# Patient Record
Sex: FEMALE | Race: WHITE | NOT HISPANIC OR LATINO | URBAN - METROPOLITAN AREA
[De-identification: names, ages, dates, MRNs, and addresses within clinical notes are randomized per-mention and may not be internally consistent; named-entity substitution may affect disease eponyms.]

---

## 2017-06-01 ENCOUNTER — INPATIENT (INPATIENT)
Facility: HOSPITAL | Age: 82
LOS: 0 days | Discharge: HOME HEALTH | End: 2017-06-02
Attending: INTERNAL MEDICINE

## 2017-06-01 DIAGNOSIS — M81.0 AGE-RELATED OSTEOPOROSIS WITHOUT CURRENT PATHOLOGICAL FRACTURE: ICD-10-CM

## 2017-06-01 DIAGNOSIS — I10 ESSENTIAL (PRIMARY) HYPERTENSION: ICD-10-CM

## 2017-06-01 DIAGNOSIS — S72.009A FRACTURE OF UNSPECIFIED PART OF NECK OF UNSPECIFIED FEMUR, INITIAL ENCOUNTER FOR CLOSED FRACTURE: ICD-10-CM

## 2017-06-01 DIAGNOSIS — R29.6 REPEATED FALLS: ICD-10-CM

## 2017-06-01 DIAGNOSIS — R39.89 OTHER SYMPTOMS AND SIGNS INVOLVING THE GENITOURINARY SYSTEM: ICD-10-CM

## 2017-06-28 DIAGNOSIS — K59.00 CONSTIPATION, UNSPECIFIED: ICD-10-CM

## 2017-06-28 DIAGNOSIS — R47.81 SLURRED SPEECH: ICD-10-CM

## 2017-06-28 DIAGNOSIS — I10 ESSENTIAL (PRIMARY) HYPERTENSION: ICD-10-CM

## 2017-06-28 DIAGNOSIS — R53.1 WEAKNESS: ICD-10-CM
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
## 2017-06-28 DIAGNOSIS — R47.1 DYSARTHRIA AND ANARTHRIA: ICD-10-CM

## 2017-06-28 DIAGNOSIS — E86.0 DEHYDRATION: ICD-10-CM

## 2017-06-28 DIAGNOSIS — Z90.49 ACQUIRED ABSENCE OF OTHER SPECIFIED PARTS OF DIGESTIVE TRACT: ICD-10-CM

## 2017-06-28 DIAGNOSIS — M19.90 UNSPECIFIED OSTEOARTHRITIS, UNSPECIFIED SITE: ICD-10-CM
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
## 2017-06-28 DIAGNOSIS — Z87.81 PERSONAL HISTORY OF (HEALED) TRAUMATIC FRACTURE: ICD-10-CM

## 2017-06-28 DIAGNOSIS — Z96.653 PRESENCE OF ARTIFICIAL KNEE JOINT, BILATERAL: ICD-10-CM
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
## 2017-06-28 DIAGNOSIS — M81.0 AGE-RELATED OSTEOPOROSIS WITHOUT CURRENT PATHOLOGICAL FRACTURE: ICD-10-CM

## 2017-06-28 DIAGNOSIS — S50.02XA CONTUSION OF LEFT ELBOW, INITIAL ENCOUNTER: ICD-10-CM

## 2017-06-28 DIAGNOSIS — E21.3 HYPERPARATHYROIDISM, UNSPECIFIED: ICD-10-CM

## 2017-06-28 DIAGNOSIS — N39.0 URINARY TRACT INFECTION, SITE NOT SPECIFIED: ICD-10-CM

## 2019-04-11 ENCOUNTER — EMERGENCY (EMERGENCY)
Facility: HOSPITAL | Age: 84
LOS: 0 days | Discharge: HOME | End: 2019-04-11
Attending: EMERGENCY MEDICINE | Admitting: EMERGENCY MEDICINE
Payer: MEDICARE

## 2019-04-11 VITALS
HEIGHT: 63 IN | RESPIRATION RATE: 16 BRPM | WEIGHT: 130.07 LBS | OXYGEN SATURATION: 97 % | SYSTOLIC BLOOD PRESSURE: 189 MMHG | DIASTOLIC BLOOD PRESSURE: 82 MMHG | HEART RATE: 74 BPM | TEMPERATURE: 96 F

## 2019-04-11 VITALS
HEART RATE: 80 BPM | OXYGEN SATURATION: 99 % | DIASTOLIC BLOOD PRESSURE: 80 MMHG | SYSTOLIC BLOOD PRESSURE: 185 MMHG | RESPIRATION RATE: 18 BRPM

## 2019-04-11 DIAGNOSIS — M25.552 PAIN IN LEFT HIP: ICD-10-CM

## 2019-04-11 DIAGNOSIS — I10 ESSENTIAL (PRIMARY) HYPERTENSION: ICD-10-CM
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
## 2019-04-11 DIAGNOSIS — E03.9 HYPOTHYROIDISM, UNSPECIFIED: ICD-10-CM

## 2019-04-11 DIAGNOSIS — Z79.2 LONG TERM (CURRENT) USE OF ANTIBIOTICS: ICD-10-CM

## 2019-04-11 DIAGNOSIS — Z96.653 PRESENCE OF ARTIFICIAL KNEE JOINT, BILATERAL: Chronic | ICD-10-CM
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
## 2019-04-11 DIAGNOSIS — Z79.899 OTHER LONG TERM (CURRENT) DRUG THERAPY: ICD-10-CM

## 2019-04-11 DIAGNOSIS — Z96.653 PRESENCE OF ARTIFICIAL KNEE JOINT, BILATERAL: ICD-10-CM
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
## 2019-04-11 DIAGNOSIS — Z98.890 OTHER SPECIFIED POSTPROCEDURAL STATES: Chronic | ICD-10-CM

## 2019-04-11 DIAGNOSIS — Z96.642 PRESENCE OF LEFT ARTIFICIAL HIP JOINT: ICD-10-CM

## 2019-04-11 DIAGNOSIS — Z96.642 PRESENCE OF LEFT ARTIFICIAL HIP JOINT: Chronic | ICD-10-CM

## 2019-04-11 LAB
APPEARANCE UR: CLEAR — SIGNIFICANT CHANGE UP
BACTERIA # UR AUTO: ABNORMAL
BILIRUB UR-MCNC: NEGATIVE — SIGNIFICANT CHANGE UP
COLOR SPEC: YELLOW — SIGNIFICANT CHANGE UP
DIFF PNL FLD: ABNORMAL
EPI CELLS # UR: ABNORMAL /HPF
GLUCOSE UR QL: NEGATIVE MG/DL — SIGNIFICANT CHANGE UP
KETONES UR-MCNC: NEGATIVE — SIGNIFICANT CHANGE UP
LEUKOCYTE ESTERASE UR-ACNC: ABNORMAL
NITRITE UR-MCNC: POSITIVE
PH UR: 6.5 — SIGNIFICANT CHANGE UP (ref 5–8)
PROT UR-MCNC: 30 MG/DL
RBC CASTS # UR COMP ASSIST: ABNORMAL /HPF
SP GR SPEC: 1.02 — SIGNIFICANT CHANGE UP (ref 1.01–1.03)
UROBILINOGEN FLD QL: 0.2 MG/DL — SIGNIFICANT CHANGE UP (ref 0.2–0.2)
WBC UR QL: ABNORMAL /HPF

## 2019-04-11 PROCEDURE — 73502 X-RAY EXAM HIP UNI 2-3 VIEWS: CPT | Mod: 26,LT

## 2019-04-11 PROCEDURE — 70450 CT HEAD/BRAIN W/O DYE: CPT | Mod: 26

## 2019-04-11 PROCEDURE — 99284 EMERGENCY DEPT VISIT MOD MDM: CPT

## 2019-04-11 PROCEDURE — 72170 X-RAY EXAM OF PELVIS: CPT | Mod: 26,59

## 2019-04-11 PROCEDURE — 72125 CT NECK SPINE W/O DYE: CPT | Mod: 26

## 2019-04-11 RX ORDER — CEFPODOXIME PROXETIL 100 MG
1 TABLET ORAL
Qty: 14 | Refills: 0 | OUTPATIENT
Start: 2019-04-11 | End: 2019-04-17

## 2019-04-11 RX ORDER — OXYCODONE AND ACETAMINOPHEN 5; 325 MG/1; MG/1
1 TABLET ORAL ONCE
Qty: 0 | Refills: 0 | Status: DISCONTINUED | OUTPATIENT
Start: 2019-04-11 | End: 2019-04-11

## 2019-04-11 RX ADMIN — OXYCODONE AND ACETAMINOPHEN 1 TABLET(S): 5; 325 TABLET ORAL at 13:23

## 2019-04-11 NOTE — ED ADULT NURSE NOTE - PSH
H/O carpal tunnel repair  B/L  H/O total knee replacement, bilateral    History of hip replacement, total, left H/O carpal tunnel repair  B/L  H/O total knee replacement, bilateral    History of hip replacement, total, left  approx 10/2013

## 2019-04-11 NOTE — ED PROVIDER NOTE - NS ED ROS FT
Review of Systems    Constitutional: (-) fever  Cardiovascular: (-) chest pain, (-) palpitation   Respiratory: (-) shortness of breath  Gastrointestinal: (-) abdominal pain (-) vomiting  Musculoskeletal: (-) neck pain, (-) back pain  Integumentary: (-) rash, (-) edema  Neurological: (-) headache, (-) altered mental status  Heme/Lymph: (-) easy bruising (-) easy bleeding

## 2019-04-11 NOTE — ED PROVIDER NOTE - OBJECTIVE STATEMENT
89 yo f pmhx sig fo HTN and hypothyroidism presents with L hip pain s/p trip and fall over the curb on Tue. [place] mall tri[place] curb landing on L side and L hip pain and head striking ground with no LOC. Pt was able to stand and bear weight on L hip immediately after the fall and since with pain AROM and walking on L hip. No n/v, ams, focal deficits, numbness or paresthesias, lightheadedness or syncope. No anticoag/antiplatlet use.    I have reviewed available current nursing and previous documentation of past medical, surgical, family, and/or social history.

## 2019-04-11 NOTE — ED PROVIDER NOTE - PSH
H/O carpal tunnel repair  B/L  H/O total knee replacement, bilateral    History of hip replacement, total, left  approx 10/2013

## 2019-04-11 NOTE — ED PROVIDER NOTE - PROGRESS NOTE DETAILS
Pt walked to PT therapy for clearance Pt evaluated and cleared by PT pt is able to walk up the stairs and flat ground, live with son.

## 2019-04-11 NOTE — ED PROVIDER NOTE - CLINICAL SUMMARY MEDICAL DECISION MAKING FREE TEXT BOX
extremely spry and functional 89y/o with left hip pain s/p mechanical fall as above, had actually been having pain in left SIJ area prior to fall, saw PMD who prescribed tylenol without relief, pt with no other symptoms, no knee pain, son concerned as patient has hardware in hip, on exam vital signs appreciated, well appearing and AAO x 3, no marks of trauma, head nc/at, perrla, EOMI, conj pink op clear neck supple cor rrr lungs cta abd snt no c/c/e pulses equal calves nontender neuro intact, FROM x 4 with minimal left hip pain on external rotation, NVI, imaging reviewed, pt evaluated by rehab, is safe discharge, incidental CTH finding d/w patient and son, may f/u with neuro as outpatient. Patient counseled regarding conditions which should prompt return.

## 2019-04-11 NOTE — ED PROVIDER NOTE - PHYSICAL EXAMINATION
Physical Exam    Vital Signs: I have reviewed the initial vital signs.  Constitutional: well-nourished, appears stated age, no acute distress  Eyes: PERRLA, EOM intact, and symmetrical lids.  ENT: No TTP over the head or face,.  Cardiovascular: regular rate, regular rhythm, well-perfused extremities  Respiratory: unlabored respiratory effort, clear to auscultation bilaterally  Gastrointestinal: soft, non-tender abdomen, no bruising, no distention, atraumatic  Musculoskeletal: supple neck, no C-spine TTP, full neck flexion and rotation, ambulates and bear weight with full ROM at the HIP b/l, chest atraumatic with no TTP, no spinal ttp. No bruising, no abrasions.  Integumentary: No bruising, no lacerations, no abrasions.  Neurologic: awake, alert, cranial nerves II-XII grossly intact, extremities’ motor and sensory functions grossly intact, no ataxia, no dysemtria

## 2019-04-11 NOTE — ED PROVIDER NOTE - NSFOLLOWUPINSTRUCTIONS_ED_ALL_ED_FT
Fall Prevention in the Home    Falls can cause injuries and can affect people from all age groups. There are many simple things that you can do to make your home safe and to help prevent falls.    WHAT CAN I DO ON THE OUTSIDE OF MY HOME?  Regularly repair the edges of walkways and driveways and fix any cracks.  Remove high doorway thresholds.  Trim any shrubbery on the main path into your home.  Use bright outdoor lighting.  Clear walkways of debris and clutter, including tools and rocks.  Regularly check that handrails are securely fastened and in good repair. Both sides of any steps should have handrails.  Install guardrails along the edges of any raised decks or porches.  Have leaves, snow, and ice cleared regularly.  Use sand or salt on walkways during winter months.  In the garage, clean up any spills right away, including grease or oil spills.    WHAT CAN I DO IN THE BATHROOM?  Use night lights.  Install grab bars by the toilet and in the tub and shower. Do not use towel bars as grab bars.  Use non-skid mats or decals on the floor of the tub or shower.  If you need to sit down while you are in the shower, use a plastic, non-slip stool.  Keep the floor dry. Immediately clean up any water that spills on the floor.  Remove soap buildup in the tub or shower on a regular basis.  Attach bath mats securely with double-sided non-slip rug tape.  Remove throw rugs and other tripping hazards from the floor.    WHAT CAN I DO IN THE BEDROOM?  Use night lights.  Make sure that a bedside light is easy to reach.  Do not use oversized bedding that drapes onto the floor.  Have a firm chair that has side arms to use for getting dressed.  Remove throw rugs and other tripping hazards from the floor.    WHAT CAN I DO IN THE KITCHEN?  Clean up any spills right away.  Avoid walking on wet floors.  Place frequently used items in easy-to-reach places.  If you need to reach for something above you, use a sturdy step stool that has a grab bar.  Keep electrical cables out of the way.  Do not use floor polish or wax that makes floors slippery. If you have to use wax, make sure that it is non-skid floor wax.  Remove throw rugs and other tripping hazards from the floor.    WHAT CAN I DO IN THE STAIRWAYS?  Do not leave any items on the stairs.  Make sure that there are handrails on both sides of the stairs. Fix handrails that are broken or loose. Make sure that handrails are as long as the stairways.  Check any carpeting to make sure that it is firmly attached to the stairs. Fix any carpet that is loose or worn.  Avoid having throw rugs at the top or bottom of stairways, or secure the rugs with carpet tape to prevent them from moving.  Make sure that you have a light switch at the top of the stairs and the bottom of the stairs. If you do not have them, have them installed.    WHAT ARE SOME OTHER FALL PREVENTION TIPS?  Wear closed-toe shoes that fit well and support your feet. Wear shoes that have rubber soles or low heels.  When you use a stepladder, make sure that it is completely opened and that the sides are firmly locked. Have someone hold the ladder while you are using it. Do not climb a closed stepladder.  Add color or contrast paint or tape to grab bars and handrails in your home. Place contrasting color strips on the first and last steps.  Use mobility aids as needed, such as canes, walkers, scooters, and crutches.  Turn on lights if it is dark. Replace any light bulbs that burn out.  Set up furniture so that there are clear paths. Keep the furniture in the same spot.  Fix any uneven floor surfaces.  Choose a carpet design that does not hide the edge of steps of a stairway.  Be aware of any and all pets.  Review your medicines with your healthcare provider. Some medicines can cause dizziness or changes in blood pressure, which increase your risk of falling.     Talk with your health care provider about other ways that you can decrease your risk of falls. This may include working with a physical therapist or  to improve your strength, balance, and endurance.    ADDITIONAL NOTES AND INSTRUCTIONS    Please follow up with your Primary MD in 24-48 hr.  Seek immediate medical care for any new/worsening signs or symptoms.       Contusion    A contusion is a deep bruise. Contusions are the result of a blunt injury to tissues and muscle fibers under the skin. The skin overlying the contusion may turn blue, purple, or yellow. Symptoms also include pain and swelling in the injured area. Symptoms should resolve with time.     SEEK IMMEDIATE MEDICAL CARE IF YOU HAVE THE FOLLOWING SYMPTOMS: severe pain, numbness, tingling, pain, weakness, or skin color/temperature change in any part of your body distal to the fracture.    FOLLOW UP WITH NEUROLOGY AND Physical Therapy REHAB    TO follow up with PT rehab Call [phone] for [facility]

## 2019-04-11 NOTE — ED PROVIDER NOTE - ATTENDING CONTRIBUTION TO CARE
[address] [facility] EMERGENCY DEPT 
[facility] [address] [place] [id number] 
[phone] Work/School Note Date: 10/27/2017 To Whom It May concern: 
 
Homero Tabares was seen and treated today in the emergency room by the following provider(s): 
Attending Provider: Gonzales Vicente MD 
Nurse Practitioner: Lui Maloney NP. Homero Tabares may return to work on 10/30/17. Sincerely, Lui Maloney NP 
 
 
 

extremely spry and functional 89y/o with left hip pain s/p mechanical fall as above, had actually been having pain in left SIJ area prior to fall, saw PMD who prescribed tylenol without relief, pt with no other symptoms, no knee pain, son concerned as patient has hardware in hip, on exam vital signs appreciated, well appearing and AAO x 3, no marks of trauma, head nc/at, perrla, EOMI, conj pink op clear neck supple cor rrr lungs cta abd snt no c/c/e pulses equal calves nontender neuro intact, FROM x 4 with minimal left hip pain on external rotation, NVI, imaging reviewed, pt evaluated by rehab, is safe discharge, incidental CTH finding d/w patient and son, may f/u with neuro as outpatient. Patient counseled regarding conditions which should prompt return.

## 2019-04-11 NOTE — ED ADULT TRIAGE NOTE - CHIEF COMPLAINT QUOTE
Pt. tripped and fall  and complain of left hip pain and left arm pain. Pt. has hx of left hip replacement

## 2019-04-25 ENCOUNTER — INPATIENT (INPATIENT)
Facility: HOSPITAL | Age: 84
LOS: 5 days | Discharge: HOME HEALTH | End: 2019-05-01
Attending: INTERNAL MEDICINE | Admitting: INTERNAL MEDICINE
Payer: MEDICARE

## 2019-04-25 VITALS
HEIGHT: 64 IN | SYSTOLIC BLOOD PRESSURE: 197 MMHG | WEIGHT: 110.01 LBS | OXYGEN SATURATION: 94 % | DIASTOLIC BLOOD PRESSURE: 86 MMHG | HEART RATE: 70 BPM | TEMPERATURE: 98 F | RESPIRATION RATE: 18 BRPM

## 2019-04-25 DIAGNOSIS — Z96.653 PRESENCE OF ARTIFICIAL KNEE JOINT, BILATERAL: Chronic | ICD-10-CM

## 2019-04-25 DIAGNOSIS — Z96.642 PRESENCE OF LEFT ARTIFICIAL HIP JOINT: Chronic | ICD-10-CM

## 2019-04-25 DIAGNOSIS — Z98.890 OTHER SPECIFIED POSTPROCEDURAL STATES: Chronic | ICD-10-CM

## 2019-04-25 LAB
ALBUMIN SERPL ELPH-MCNC: 4 G/DL — SIGNIFICANT CHANGE UP (ref 3.5–5.2)
ALP SERPL-CCNC: 68 U/L — SIGNIFICANT CHANGE UP (ref 30–115)
ALT FLD-CCNC: 10 U/L — SIGNIFICANT CHANGE UP (ref 0–41)
ANION GAP SERPL CALC-SCNC: 12 MMOL/L — SIGNIFICANT CHANGE UP (ref 7–14)
APPEARANCE UR: CLEAR — SIGNIFICANT CHANGE UP
APTT BLD: 27.5 SEC — SIGNIFICANT CHANGE UP (ref 27–39.2)
AST SERPL-CCNC: 22 U/L — SIGNIFICANT CHANGE UP (ref 0–41)
BACTERIA # UR AUTO: ABNORMAL
BASOPHILS # BLD AUTO: 0.04 K/UL — SIGNIFICANT CHANGE UP (ref 0–0.2)
BASOPHILS NFR BLD AUTO: 0.7 % — SIGNIFICANT CHANGE UP (ref 0–1)
BILIRUB SERPL-MCNC: 0.4 MG/DL — SIGNIFICANT CHANGE UP (ref 0.2–1.2)
BILIRUB UR-MCNC: NEGATIVE — SIGNIFICANT CHANGE UP
BUN SERPL-MCNC: 35 MG/DL — HIGH (ref 10–20)
CALCIUM SERPL-MCNC: 9.9 MG/DL — SIGNIFICANT CHANGE UP (ref 8.5–10.1)
CHLORIDE SERPL-SCNC: 108 MMOL/L — SIGNIFICANT CHANGE UP (ref 98–110)
CO2 SERPL-SCNC: 23 MMOL/L — SIGNIFICANT CHANGE UP (ref 17–32)
COD CRY URNS QL: NEGATIVE — SIGNIFICANT CHANGE UP
COLOR SPEC: YELLOW — SIGNIFICANT CHANGE UP
CREAT SERPL-MCNC: 1.2 MG/DL — SIGNIFICANT CHANGE UP (ref 0.7–1.5)
DIFF PNL FLD: ABNORMAL
EOSINOPHIL # BLD AUTO: 0.16 K/UL — SIGNIFICANT CHANGE UP (ref 0–0.7)
EOSINOPHIL NFR BLD AUTO: 3 % — SIGNIFICANT CHANGE UP (ref 0–8)
EPI CELLS # UR: ABNORMAL /HPF
GLUCOSE SERPL-MCNC: 89 MG/DL — SIGNIFICANT CHANGE UP (ref 70–99)
GLUCOSE UR QL: NEGATIVE MG/DL — SIGNIFICANT CHANGE UP
GRAN CASTS # UR COMP ASSIST: NEGATIVE — SIGNIFICANT CHANGE UP
HCT VFR BLD CALC: 41.3 % — SIGNIFICANT CHANGE UP (ref 37–47)
HGB BLD-MCNC: 13.6 G/DL — SIGNIFICANT CHANGE UP (ref 12–16)
HYALINE CASTS # UR AUTO: NEGATIVE — SIGNIFICANT CHANGE UP
IMM GRANULOCYTES NFR BLD AUTO: 0.4 % — HIGH (ref 0.1–0.3)
INR BLD: 1 RATIO — SIGNIFICANT CHANGE UP (ref 0.65–1.3)
KETONES UR-MCNC: NEGATIVE — SIGNIFICANT CHANGE UP
LEUKOCYTE ESTERASE UR-ACNC: ABNORMAL
LYMPHOCYTES # BLD AUTO: 0.98 K/UL — LOW (ref 1.2–3.4)
LYMPHOCYTES # BLD AUTO: 18.1 % — LOW (ref 20.5–51.1)
MAGNESIUM SERPL-MCNC: 1.9 MG/DL — SIGNIFICANT CHANGE UP (ref 1.8–2.4)
MCHC RBC-ENTMCNC: 28.6 PG — SIGNIFICANT CHANGE UP (ref 27–31)
MCHC RBC-ENTMCNC: 32.9 G/DL — SIGNIFICANT CHANGE UP (ref 32–37)
MCV RBC AUTO: 86.9 FL — SIGNIFICANT CHANGE UP (ref 81–99)
MONOCYTES # BLD AUTO: 0.78 K/UL — HIGH (ref 0.1–0.6)
MONOCYTES NFR BLD AUTO: 14.4 % — HIGH (ref 1.7–9.3)
NEUTROPHILS # BLD AUTO: 3.43 K/UL — SIGNIFICANT CHANGE UP (ref 1.4–6.5)
NEUTROPHILS NFR BLD AUTO: 63.4 % — SIGNIFICANT CHANGE UP (ref 42.2–75.2)
NITRITE UR-MCNC: POSITIVE
NRBC # BLD: 0 /100 WBCS — SIGNIFICANT CHANGE UP (ref 0–0)
PH UR: 6 — SIGNIFICANT CHANGE UP (ref 5–8)
PLATELET # BLD AUTO: 111 K/UL — LOW (ref 130–400)
POTASSIUM SERPL-MCNC: 4.7 MMOL/L — SIGNIFICANT CHANGE UP (ref 3.5–5)
POTASSIUM SERPL-SCNC: 4.7 MMOL/L — SIGNIFICANT CHANGE UP (ref 3.5–5)
PROT SERPL-MCNC: 7 G/DL — SIGNIFICANT CHANGE UP (ref 6–8)
PROT UR-MCNC: ABNORMAL MG/DL
PROTHROM AB SERPL-ACNC: 11.5 SEC — SIGNIFICANT CHANGE UP (ref 9.95–12.87)
RBC # BLD: 4.75 M/UL — SIGNIFICANT CHANGE UP (ref 4.2–5.4)
RBC # FLD: 13.2 % — SIGNIFICANT CHANGE UP (ref 11.5–14.5)
RBC CASTS # UR COMP ASSIST: ABNORMAL /HPF
SODIUM SERPL-SCNC: 143 MMOL/L — SIGNIFICANT CHANGE UP (ref 135–146)
SP GR SPEC: 1.02 — SIGNIFICANT CHANGE UP (ref 1.01–1.03)
TRI-PHOS CRY UR QL COMP ASSIST: NEGATIVE — SIGNIFICANT CHANGE UP
TROPONIN T SERPL-MCNC: <0.01 NG/ML — SIGNIFICANT CHANGE UP
URATE CRY FLD QL MICRO: NEGATIVE — SIGNIFICANT CHANGE UP
UROBILINOGEN FLD QL: 0.2 MG/DL — SIGNIFICANT CHANGE UP (ref 0.2–0.2)
WBC # BLD: 5.41 K/UL — SIGNIFICANT CHANGE UP (ref 4.8–10.8)
WBC # FLD AUTO: 5.41 K/UL — SIGNIFICANT CHANGE UP (ref 4.8–10.8)
WBC UR QL: ABNORMAL /HPF

## 2019-04-25 PROCEDURE — 99285 EMERGENCY DEPT VISIT HI MDM: CPT

## 2019-04-25 PROCEDURE — 70496 CT ANGIOGRAPHY HEAD: CPT | Mod: 26

## 2019-04-25 PROCEDURE — 71045 X-RAY EXAM CHEST 1 VIEW: CPT | Mod: 26

## 2019-04-25 PROCEDURE — 70450 CT HEAD/BRAIN W/O DYE: CPT | Mod: 26,59

## 2019-04-25 RX ORDER — CEFTRIAXONE 500 MG/1
1 INJECTION, POWDER, FOR SOLUTION INTRAMUSCULAR; INTRAVENOUS ONCE
Qty: 0 | Refills: 0 | Status: COMPLETED | OUTPATIENT
Start: 2019-04-25 | End: 2019-04-25

## 2019-04-25 RX ORDER — AMLODIPINE BESYLATE 2.5 MG/1
5 TABLET ORAL ONCE
Qty: 0 | Refills: 0 | Status: COMPLETED | OUTPATIENT
Start: 2019-04-25 | End: 2019-04-25

## 2019-04-25 RX ADMIN — AMLODIPINE BESYLATE 5 MILLIGRAM(S): 2.5 TABLET ORAL at 21:47

## 2019-04-25 RX ADMIN — CEFTRIAXONE 1 GRAM(S): 500 INJECTION, POWDER, FOR SOLUTION INTRAMUSCULAR; INTRAVENOUS at 21:21

## 2019-04-25 RX ADMIN — CEFTRIAXONE 100 GRAM(S): 500 INJECTION, POWDER, FOR SOLUTION INTRAMUSCULAR; INTRAVENOUS at 19:45

## 2019-04-25 NOTE — H&P ADULT - NSHPLABSRESULTS_GEN_ALL_CORE
< from: CT Angio Head w/ IV Cont (19 @ 19:04) >    EXAM:  CT ANGIO BRAIN (W)AW IC          IMPRESSION:    1.  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    2.  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    Spoke with LARISSA JONES PA on 2019 8:42 PM with readback.    ADRAINA NIXON M.D., RESIDENT RADIOLOGIST  This document has been electronically signed.  ANAHY CAREY M.D., ATTENDING RADIOLOGIST  This document has been electronically signed. 2019  8:42PM    < end of copied text >      < from: CT Head No Cont (19 @ 19:04) >    EXAM:  CT BRAIN          PROCEDURE DATE:  2019      IMPRESSION:    No CT evidence of acute territorial infarct, intracranial hemorrhage, or   mass effect.    Stable nonacute findings as detailed above.    ADRIANA NIXON M.D., RESIDENT RADIOLOGIST  This document has been electronically signed.  DONNIE MENDEZ M.D., ATTENDING RADIOLOGIST  This document has been electronically signed. 2019  7:54PM     < end of copied text >                          13.6   5.41  )-----------( 111      ( 2019 17:20 )             41.3         143  |  108  |  35<H>  ----------------------------<  89  4.7   |  23  |  1.2    Ca    9.9      2019 17:20  Mg     1.9         TPro  7.0  /  Alb  4.0  /  TBili  0.4  /  DBili  x   /  AST  22  /  ALT  10  /  AlkPhos  68            Urinalysis Basic - ( 2019 18:35 )    Color: Yellow / Appearance: Clear / S.020 / pH: x  Gluc: x / Ketone: Negative  / Bili: Negative / Urobili: 0.2 mg/dL   Blood: x / Protein: Trace mg/dL / Nitrite: Positive   Leuk Esterase: Small / RBC: 3-5 /HPF / WBC 6-10 /HPF   Sq Epi: x / Non Sq Epi: Few /HPF / Bacteria: Many      PT/INR - ( 2019 17:20 )   PT: 11.50 sec;   INR: 1.00 ratio         PTT - ( 2019 17:20 )  PTT:27.5 sec  Lactate Trend    CARDIAC MARKERS ( 2019 17:20 )  x     / <0.01 ng/mL / x     / x     / x          CAPILLARY BLOOD GLUCOSE      POCT Blood Glucose.: 82 mg/dL (2019 16:51)

## 2019-04-25 NOTE — ED ADULT NURSE NOTE - NSIMPLEMENTINTERV_GEN_ALL_ED
Implemented All Fall with Harm Risk Interventions:  [place] to call system. Call bell, personal items and telephone within reach. Instruct patient to call for assistance. Room bathroom lighting operational. Non-slip footwear when patient is off stretcher. Physically safe environment: no spills, clutter or unnecessary equipment. Stretcher in lowest position, wheels locked, appropriate side rails in place. Provide visual cue, wrist band, yellow gown, etc. Monitor gait and stability. Monitor for mental status changes and reorient to person, place, and time. Review medications for side effects contributing to fall risk. Reinforce activity limits and safety measures with patient and family. Provide visual clues: red socks.

## 2019-04-25 NOTE — ED PROVIDER NOTE - CLINICAL SUMMARY MEDICAL DECISION MAKING FREE TEXT BOX
CT negative for acute process. Discussed with NSG. Aneurysm likely incidental. Family aware.  Admitted to medicine.

## 2019-04-25 NOTE — H&P ADULT - NSHPREVIEWOFSYSTEMS_GEN_ALL_CORE
see HPI and PMH, uses glasses see HPI and PMH, uses glasses has hearing loss, and currently a sore throat. Other 7 ROS generally negative

## 2019-04-25 NOTE — ED PROVIDER NOTE - OBJECTIVE STATEMENT
89 y/o female presents with family with dizziness. patient awoke today from nap with symptoms. patient denies any headache, visual changes, falls or head trauma. patient with good po intake and denies any gross hematuria. patient without any palpitations, syncope, chest pain, weakness to extremities. patient states generalized weakness . patient denies any chills or fever.

## 2019-04-25 NOTE — ED ADULT NURSE REASSESSMENT NOTE - NS ED NURSE REASSESS COMMENT FT1
Pt is noted with elevated BP, spoke w patient who states she takes her BP meds QHS, Dr. Quezada ordered Norvasc 5 mg x 1 dose. Will re-assess BP.

## 2019-04-25 NOTE — ED PROVIDER NOTE - NS ED ROS FT
Review of Systems    Constitutional: (-) fever/ chills   Eyes/ENT: (-) blurry vision, (-) epistaxis (-)no tinnitus  Cardiovascular: (-) chest pain, (-) syncope (-) palpitations  Respiratory: (-) cough, (-) shortness of breath  Gastrointestinal: (-) vomiting, (-) diarrhea (-) abdominal pain  Musculoskeletal: (-) neck pain, (-) back pain, (-) joint pain (-) pedal edema   Integumentary: (-) rash, (-) swelling  Neurological: (-) headache, (-) altered mental status (+)dizziness  Psychiatric: (-) hallucinations or depression   Allergic/Immunologic: (-) pruritus

## 2019-04-25 NOTE — H&P ADULT - HISTORY OF PRESENT ILLNESS
[age]yo female tells me she had dizziness in the morning but even though that symptom has resolved, she continues to have weakness. Notes that she generally is not eating as much as she used to 89yo female tells me she had dizziness in the morning but even though that symptom has resolved, she continues to have weakness. Notes that she generally is not eating as much as she used to. Denies headache or falls. No modifying factors readily identified

## 2019-04-25 NOTE — ED PROVIDER NOTE - PROGRESS NOTE DETAILS
spoke with neurosurgical EMIL Pena who consulted dr. zuly newsome regarding CTA findings . patient to follow up outpatient spoke with dr. aiken who accepts admission

## 2019-04-25 NOTE — H&P ADULT - PROBLEM SELECTOR PLAN 5
ER discussed findings (stenosis) with neurosurgery attending who advised outpatient followup (Add statin?)

## 2019-04-25 NOTE — H&P ADULT - NSICDXPASTSURGICALHX_GEN_ALL_CORE_FT
PAST SURGICAL HISTORY:  H/O carpal tunnel repair B/L    H/O total knee replacement, bilateral     History of hip replacement, total, left approx 10/2013

## 2019-04-25 NOTE — ED PROVIDER NOTE - ATTENDING CONTRIBUTION TO CARE
Pt presenting with dizziness described as vertigo on waking this AM. [id number] at this time. No lateralizing sign on exam. No nystagmus. - Plan: Head CT, CTA, CBC, INR, PTT, CMP, FSG, EKG, reassess. disposition unclear at this time.

## 2019-04-26 DIAGNOSIS — N39.0 URINARY TRACT INFECTION, SITE NOT SPECIFIED: ICD-10-CM

## 2019-04-26 DIAGNOSIS — E86.0 DEHYDRATION: ICD-10-CM

## 2019-04-26 DIAGNOSIS — I10 ESSENTIAL (PRIMARY) HYPERTENSION: ICD-10-CM

## 2019-04-26 DIAGNOSIS — R90.89 OTHER ABNORMAL FINDINGS ON DIAGNOSTIC IMAGING OF CENTRAL NERVOUS SYSTEM: ICD-10-CM

## 2019-04-26 DIAGNOSIS — R42 DIZZINESS AND GIDDINESS: ICD-10-CM

## 2019-04-26 DIAGNOSIS — E03.9 HYPOTHYROIDISM, UNSPECIFIED: ICD-10-CM

## 2019-04-26 LAB
ALBUMIN SERPL ELPH-MCNC: 3.8 G/DL — SIGNIFICANT CHANGE UP (ref 3.5–5.2)
ALP SERPL-CCNC: 68 U/L — SIGNIFICANT CHANGE UP (ref 30–115)
ALT FLD-CCNC: 9 U/L — SIGNIFICANT CHANGE UP (ref 0–41)
ANION GAP SERPL CALC-SCNC: 11 MMOL/L — SIGNIFICANT CHANGE UP (ref 7–14)
AST SERPL-CCNC: 16 U/L — SIGNIFICANT CHANGE UP (ref 0–41)
BILIRUB SERPL-MCNC: 0.5 MG/DL — SIGNIFICANT CHANGE UP (ref 0.2–1.2)
BUN SERPL-MCNC: 28 MG/DL — HIGH (ref 10–20)
CALCIUM SERPL-MCNC: 9.2 MG/DL — SIGNIFICANT CHANGE UP (ref 8.5–10.1)
CHLORIDE SERPL-SCNC: 103 MMOL/L — SIGNIFICANT CHANGE UP (ref 98–110)
CO2 SERPL-SCNC: 25 MMOL/L — SIGNIFICANT CHANGE UP (ref 17–32)
CREAT SERPL-MCNC: 1 MG/DL — SIGNIFICANT CHANGE UP (ref 0.7–1.5)
GLUCOSE SERPL-MCNC: 86 MG/DL — SIGNIFICANT CHANGE UP (ref 70–99)
HCT VFR BLD CALC: 41.5 % — SIGNIFICANT CHANGE UP (ref 37–47)
HGB BLD-MCNC: 13.6 G/DL — SIGNIFICANT CHANGE UP (ref 12–16)
MCHC RBC-ENTMCNC: 28 PG — SIGNIFICANT CHANGE UP (ref 27–31)
MCHC RBC-ENTMCNC: 32.8 G/DL — SIGNIFICANT CHANGE UP (ref 32–37)
MCV RBC AUTO: 85.6 FL — SIGNIFICANT CHANGE UP (ref 81–99)
NRBC # BLD: 0 /100 WBCS — SIGNIFICANT CHANGE UP (ref 0–0)
PLATELET # BLD AUTO: 144 K/UL — SIGNIFICANT CHANGE UP (ref 130–400)
POTASSIUM SERPL-MCNC: 4 MMOL/L — SIGNIFICANT CHANGE UP (ref 3.5–5)
POTASSIUM SERPL-SCNC: 4 MMOL/L — SIGNIFICANT CHANGE UP (ref 3.5–5)
PROT SERPL-MCNC: 6.6 G/DL — SIGNIFICANT CHANGE UP (ref 6–8)
RBC # BLD: 4.85 M/UL — SIGNIFICANT CHANGE UP (ref 4.2–5.4)
RBC # FLD: 13 % — SIGNIFICANT CHANGE UP (ref 11.5–14.5)
SODIUM SERPL-SCNC: 139 MMOL/L — SIGNIFICANT CHANGE UP (ref 135–146)
TSH SERPL-MCNC: 0.64 UIU/ML — SIGNIFICANT CHANGE UP (ref 0.27–4.2)
WBC # BLD: 5 K/UL — SIGNIFICANT CHANGE UP (ref 4.8–10.8)
WBC # FLD AUTO: 5 K/UL — SIGNIFICANT CHANGE UP (ref 4.8–10.8)

## 2019-04-26 RX ORDER — CEFTRIAXONE 500 MG/1
1 INJECTION, POWDER, FOR SOLUTION INTRAMUSCULAR; INTRAVENOUS EVERY 24 HOURS
Qty: 0 | Refills: 0 | Status: DISCONTINUED | OUTPATIENT
Start: 2019-04-26 | End: 2019-04-30

## 2019-04-26 RX ORDER — ASPIRIN/CALCIUM CARB/MAGNESIUM 324 MG
81 TABLET ORAL DAILY
Qty: 0 | Refills: 0 | Status: DISCONTINUED | OUTPATIENT
Start: 2019-04-26 | End: 2019-05-01

## 2019-04-26 RX ORDER — LOSARTAN POTASSIUM 100 MG/1
25 TABLET, FILM COATED ORAL ONCE
Qty: 0 | Refills: 0 | Status: COMPLETED | OUTPATIENT
Start: 2019-04-26 | End: 2019-04-26

## 2019-04-26 RX ORDER — HYDROXYZINE HCL 10 MG
25 TABLET ORAL ONCE
Qty: 0 | Refills: 0 | Status: COMPLETED | OUTPATIENT
Start: 2019-04-26 | End: 2019-04-26

## 2019-04-26 RX ORDER — AMLODIPINE BESYLATE 2.5 MG/1
0 TABLET ORAL
Qty: 0 | Refills: 0 | COMMUNITY

## 2019-04-26 RX ORDER — HEPARIN SODIUM 5000 [USP'U]/ML
5000 INJECTION INTRAVENOUS; SUBCUTANEOUS EVERY 12 HOURS
Qty: 0 | Refills: 0 | Status: DISCONTINUED | OUTPATIENT
Start: 2019-04-26 | End: 2019-05-01

## 2019-04-26 RX ORDER — AMLODIPINE BESYLATE 2.5 MG/1
5 TABLET ORAL DAILY
Qty: 0 | Refills: 0 | Status: DISCONTINUED | OUTPATIENT
Start: 2019-04-26 | End: 2019-04-27

## 2019-04-26 RX ORDER — LOSARTAN POTASSIUM 100 MG/1
0 TABLET, FILM COATED ORAL
Qty: 0 | Refills: 0 | COMMUNITY

## 2019-04-26 RX ORDER — BENZOCAINE AND MENTHOL 5; 1 G/100ML; G/100ML
1 LIQUID ORAL
Qty: 0 | Refills: 0 | Status: DISCONTINUED | OUTPATIENT
Start: 2019-04-26 | End: 2019-05-01

## 2019-04-26 RX ORDER — SODIUM CHLORIDE 9 MG/ML
1000 INJECTION, SOLUTION INTRAVENOUS
Qty: 0 | Refills: 0 | Status: DISCONTINUED | OUTPATIENT
Start: 2019-04-26 | End: 2019-04-27

## 2019-04-26 RX ORDER — CINACALCET 30 MG/1
30 TABLET, FILM COATED ORAL DAILY
Qty: 0 | Refills: 0 | Status: DISCONTINUED | OUTPATIENT
Start: 2019-04-26 | End: 2019-05-01

## 2019-04-26 RX ORDER — LOSARTAN POTASSIUM 100 MG/1
50 TABLET, FILM COATED ORAL DAILY
Qty: 0 | Refills: 0 | Status: DISCONTINUED | OUTPATIENT
Start: 2019-04-26 | End: 2019-04-28

## 2019-04-26 RX ORDER — CINACALCET 30 MG/1
0 TABLET, FILM COATED ORAL
Qty: 0 | Refills: 0 | COMMUNITY

## 2019-04-26 RX ORDER — SODIUM CHLORIDE 9 MG/ML
1000 INJECTION, SOLUTION INTRAVENOUS
Qty: 0 | Refills: 0 | Status: DISCONTINUED | OUTPATIENT
Start: 2019-04-26 | End: 2019-04-26

## 2019-04-26 RX ADMIN — SODIUM CHLORIDE 50 MILLILITER(S): 9 INJECTION, SOLUTION INTRAVENOUS at 03:30

## 2019-04-26 RX ADMIN — HEPARIN SODIUM 5000 UNIT(S): 5000 INJECTION INTRAVENOUS; SUBCUTANEOUS at 05:38

## 2019-04-26 RX ADMIN — CEFTRIAXONE 100 GRAM(S): 500 INJECTION, POWDER, FOR SOLUTION INTRAMUSCULAR; INTRAVENOUS at 08:58

## 2019-04-26 RX ADMIN — Medication 25 MILLIGRAM(S): at 01:29

## 2019-04-26 RX ADMIN — SODIUM CHLORIDE 60 MILLILITER(S): 9 INJECTION, SOLUTION INTRAVENOUS at 13:26

## 2019-04-26 RX ADMIN — LOSARTAN POTASSIUM 50 MILLIGRAM(S): 100 TABLET, FILM COATED ORAL at 05:38

## 2019-04-26 RX ADMIN — CINACALCET 30 MILLIGRAM(S): 30 TABLET, FILM COATED ORAL at 11:19

## 2019-04-26 RX ADMIN — LOSARTAN POTASSIUM 25 MILLIGRAM(S): 100 TABLET, FILM COATED ORAL at 01:29

## 2019-04-26 RX ADMIN — Medication 25 MILLIGRAM(S): at 21:31

## 2019-04-26 RX ADMIN — HEPARIN SODIUM 5000 UNIT(S): 5000 INJECTION INTRAVENOUS; SUBCUTANEOUS at 17:16

## 2019-04-26 RX ADMIN — Medication 81 MILLIGRAM(S): at 11:19

## 2019-04-26 RX ADMIN — AMLODIPINE BESYLATE 5 MILLIGRAM(S): 2.5 TABLET ORAL at 13:34

## 2019-04-26 NOTE — PHYSICAL THERAPY INITIAL EVALUATION ADULT - GENERAL OBSERVATIONS, REHAB EVAL
14:05-14:35 Chart reviewed. Pt encountered semireclined in bed,  may be seen by Physical Therapist as confirmed with Nurse. Patient denied pain at rest and would like to walk to bathroom but feels "weak"; +IV

## 2019-04-26 NOTE — PROGRESS NOTE ADULT - SUBJECTIVE AND OBJECTIVE BOX
Progress Note:  Provider Speciality                            Hospitalist      CARLA LARRY [id number] 89y Female     CHIEF PRESENTING COMPLAINT:  Patient is a 89y old  Female who presents with a chief complaint of dizziness, weakness (2019 21:48)        SUBJECTIVE:  Patient was seen and examined at bedside.   . No significant overnight events reported.     HISTORY OF PRESENTING ILLNESS:  HPI:  89yo female tells me she had dizziness in the morning but even though that symptom has resolved, she continues to have weakness. Notes that she generally is not eating as much as she used to. Denies headache or falls. 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  Heart: S1+S2 audible, no murmur  Lungs: bilateral  fair air entry, no wheezing, no crepitations.  Abdomen: Soft, non-tender, non-distended , no  rigidity or guarding.  CNS: AAOx2, CN  grossly intact.  Extremities:  No edema          REVIEW OF SYSTEMS:  Patient denies any headache, any vision complaints, runny nose, fever, chills, sore throat. Denies chest pain, shortness of breath, palpitation. Denies nausea, vomiting, abdominal pain, diarrhoea, Denies urinary burning, urgency, frequency, dysuria. Denies weakness in any part of the body or numbness.   At least 10 systems were reviewed in ROS. All systems reviewed  are within normal limits except for the complaints as described in Subjective.    CONSULTS:  Consultant(s) Notes Reviewed by me.   Care Discussed with Consultants/Other Providers where required.        MEDICATIONS:  MEDICATIONS  (STANDING):  aspirin enteric coated 81 milliGRAM(s) Oral daily  cefTRIAXone   IVPB 1 Gram(s) IV Intermittent every 24 hours  cinacalcet 30 milliGRAM(s) Oral daily  heparin  Injectable 5000 Unit(s) SubCutaneous every 12 hours  losartan 50 milliGRAM(s) Oral daily  sodium chloride 0.45%. 1000 milliLiter(s) (50 mL/Hr) IV Continuous <Continuous>    MEDICATIONS  (PRN):  benzocaine 15 mG/menthol 3.6 mG Lozenge 1 Lozenge Oral four times a day PRN Sore Throat      LABOROTORY DATA/MICROBIOLOGY/I & O's:                        13.6   5.00  )-----------( 144      ( 2019 06:53 )             41.5         139  |  103  |  28<H>  ----------------------------<  86  4.0   |  25  |  1.0    Ca    9.2      2019 06:53  Mg     1.9         TPro  6.6  /  Alb  3.8  /  TBili  0.5  /  DBili  x   /  AST  16  /  ALT  9   /  AlkPhos  68      PT/INR - ( 2019 17:20 )   PT: 11.50 sec;   INR: 1.00 ratio         PTT - ( 2019 17:20 )  PTT:27.5 sec  Urinalysis Basic - ( 2019 18:35 )    Color: Yellow / Appearance: Clear / S.020 / pH: x  Gluc: x / Ketone: Negative  / Bili: Negative / Urobili: 0.2 mg/dL   Blood: x / Protein: Trace mg/dL / Nitrite: Positive   Leuk Esterase: Small / RBC: 3-5 /HPF / WBC 6-10 /HPF   Sq Epi: x / Non Sq Epi: Few /HPF / Bacteria: Many      CAPILLARY BLOOD GLUCOSE      POCT Blood Glucose.: 82 mg/dL (2019 16:51)        Urinalysis Basic - ( 2019 18:35 )    Color: Yellow / Appearance: Clear / S.020 / pH: x  Gluc: x / Ketone: Negative  / Bili: Negative / Urobili: 0.2 mg/dL   Blood: x / Protein: Trace mg/dL / Nitrite: Positive   Leuk Esterase: Small / RBC: 3-5 /HPF / WBC 6-10 /HPF   Sq Epi: x / Non Sq Epi: Few /HPF / Bacteria: Many                    ASSESSMENT:     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     2. 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       PAST MEDICAL HISTORY:  HTN (hypertension)     Hypothyroid.     PAST SURGICAL HISTORY:  H/O carpal tunnel repair B/L    H/O total knee replacement, bilateral     History of hip replacement, total, left approx 10/2013.    89yo female tells me she had dizziness and frequency of urine with associated geenralizaed wekness.  ASSESSMENT:  Principal Diagnosis:  ISMAEL on Chronic renal disease Stage  stage 3   Weakness & lethargy secondary to dehydration and possible cystitis /Urinary tract infection     Associated Active Comorbid Conditions:  HTN (hypertension)   Hypothyroid.   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 Outpatient follow up   Continue with losartan as renal function shows improvement.  Speech & swallow evaluation   follow up TSh  PT/Rehab consult.   Goals of care.  GI Prophylaxis: Protonix 40mg PO QQdaily   DVT Prophylaxis: Heparin 5000 units sc q8hrs Progress Note:  Provider Speciality                            Hospitalist      CARLA LARRY [id number] 89y Female     CHIEF PRESENTING COMPLAINT:  Patient is a 89y old  Female who presents with a chief complaint of dizziness, weakness (2019 21:48)        SUBJECTIVE:  Patient was seen and examined at bedside.   . No significant overnight events reported.     HISTORY OF PRESENTING ILLNESS:  HPI:  89yo female tells me she had dizziness in the morning but even though that symptom has resolved, she continues to have weakness. Notes that she generally is not eating as much as she used to. Denies headache or falls. 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  Heart: S1+S2 audible, no murmur  Lungs: bilateral  fair air entry, no wheezing, no crepitations.  Abdomen: Soft, non-tender, non-distended , no  rigidity or guarding.  CNS: AAOx2, CN  grossly intact.  Extremities:  No edema          REVIEW OF SYSTEMS:  Patient denies any headache, any vision complaints, runny nose, fever, chills, sore throat. Denies chest pain, shortness of breath, palpitation. Denies nausea, vomiting, abdominal pain, diarrhoea, Denies urinary burning, urgency, frequency, dysuria. Denies weakness in any part of the body or numbness.   At least 10 systems were reviewed in ROS. All systems reviewed  are within normal limits except for the complaints as described in Subjective.    CONSULTS:  Consultant(s) Notes Reviewed by me.   Care Discussed with Consultants/Other Providers where required.        MEDICATIONS:  MEDICATIONS  (STANDING):  aspirin enteric coated 81 milliGRAM(s) Oral daily  cefTRIAXone   IVPB 1 Gram(s) IV Intermittent every 24 hours  cinacalcet 30 milliGRAM(s) Oral daily  heparin  Injectable 5000 Unit(s) SubCutaneous every 12 hours  losartan 50 milliGRAM(s) Oral daily  sodium chloride 0.45%. 1000 milliLiter(s) (50 mL/Hr) IV Continuous <Continuous>    MEDICATIONS  (PRN):  benzocaine 15 mG/menthol 3.6 mG Lozenge 1 Lozenge Oral four times a day PRN Sore Throat      LABOROTORY DATA/MICROBIOLOGY/I & O's:                        13.6   5.00  )-----------( 144      ( 2019 06:53 )             41.5         139  |  103  |  28<H>  ----------------------------<  86  4.0   |  25  |  1.0    Ca    9.2      2019 06:53  Mg     1.9         TPro  6.6  /  Alb  3.8  /  TBili  0.5  /  DBili  x   /  AST  16  /  ALT  9   /  AlkPhos  68      PT/INR - ( 2019 17:20 )   PT: 11.50 sec;   INR: 1.00 ratio         PTT - ( 2019 17:20 )  PTT:27.5 sec  Urinalysis Basic - ( 2019 18:35 )    Color: Yellow / Appearance: Clear / S.020 / pH: x  Gluc: x / Ketone: Negative  / Bili: Negative / Urobili: 0.2 mg/dL   Blood: x / Protein: Trace mg/dL / Nitrite: Positive   Leuk Esterase: Small / RBC: 3-5 /HPF / WBC 6-10 /HPF   Sq Epi: x / Non Sq Epi: Few /HPF / Bacteria: Many      CAPILLARY BLOOD GLUCOSE      POCT Blood Glucose.: 82 mg/dL (2019 16:51)        Urinalysis Basic - ( 2019 18:35 )    Color: Yellow / Appearance: Clear / S.020 / pH: x  Gluc: x / Ketone: Negative  / Bili: Negative / Urobili: 0.2 mg/dL   Blood: x / Protein: Trace mg/dL / Nitrite: Positive   Leuk Esterase: Small / RBC: 3-5 /HPF / WBC 6-10 /HPF   Sq Epi: x / Non Sq Epi: Few /HPF / Bacteria: Many                    ASSESSMENT:    89yo female tells me she had dizziness and frequency of urine with associated geenralizaed wekness.  ASSESSMENT:  Principal Diagnosis:  ISMAEL on Chronic renal disease Stage  stage 3   Weakness & lethargy secondary to dehydration and possible cystitis /Urinary tract infection     Associated Active Comorbid Conditions:  HTN (hypertension)   Hyperthyroid.   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 Outpatient follow up   Continue with losartan as renal function shows improvement.  Speech & swallow evaluation   Hyperthyroid. -Continue Sensipar .Follow up TSh  Hypertension - not well controlled . added norvasc  PT/Rehab consult.   Goals of care.  GI Prophylaxis: Protonix 40mg PO QQdaily   DVT Prophylaxis: Heparin 5000 units sc q8hrs

## 2019-04-26 NOTE — PHYSICAL THERAPY INITIAL EVALUATION ADULT - IMPAIRED TRANSFERS: SIT/STAND, REHAB EVAL
decreased endurance/decreased strength/impaired postural control/narrow base of support/impaired balance

## 2019-04-27 LAB
ANION GAP SERPL CALC-SCNC: 12 MMOL/L — SIGNIFICANT CHANGE UP (ref 7–14)
BUN SERPL-MCNC: 26 MG/DL — HIGH (ref 10–20)
CALCIUM SERPL-MCNC: 9.2 MG/DL — SIGNIFICANT CHANGE UP (ref 8.5–10.1)
CHLORIDE SERPL-SCNC: 107 MMOL/L — SIGNIFICANT CHANGE UP (ref 98–110)
CO2 SERPL-SCNC: 22 MMOL/L — SIGNIFICANT CHANGE UP (ref 17–32)
CREAT SERPL-MCNC: 1.2 MG/DL — SIGNIFICANT CHANGE UP (ref 0.7–1.5)
GLUCOSE SERPL-MCNC: 86 MG/DL — SIGNIFICANT CHANGE UP (ref 70–99)
HCT VFR BLD CALC: 42.7 % — SIGNIFICANT CHANGE UP (ref 37–47)
HGB BLD-MCNC: 14.1 G/DL — SIGNIFICANT CHANGE UP (ref 12–16)
MCHC RBC-ENTMCNC: 28.5 PG — SIGNIFICANT CHANGE UP (ref 27–31)
MCHC RBC-ENTMCNC: 33 G/DL — SIGNIFICANT CHANGE UP (ref 32–37)
MCV RBC AUTO: 86.3 FL — SIGNIFICANT CHANGE UP (ref 81–99)
NRBC # BLD: 0 /100 WBCS — SIGNIFICANT CHANGE UP (ref 0–0)
PLATELET # BLD AUTO: 118 K/UL — LOW (ref 130–400)
POTASSIUM SERPL-MCNC: 4.2 MMOL/L — SIGNIFICANT CHANGE UP (ref 3.5–5)
POTASSIUM SERPL-SCNC: 4.2 MMOL/L — SIGNIFICANT CHANGE UP (ref 3.5–5)
RBC # BLD: 4.95 M/UL — SIGNIFICANT CHANGE UP (ref 4.2–5.4)
RBC # FLD: 13.3 % — SIGNIFICANT CHANGE UP (ref 11.5–14.5)
SODIUM SERPL-SCNC: 141 MMOL/L — SIGNIFICANT CHANGE UP (ref 135–146)
WBC # BLD: 4.9 K/UL — SIGNIFICANT CHANGE UP (ref 4.8–10.8)
WBC # FLD AUTO: 4.9 K/UL — SIGNIFICANT CHANGE UP (ref 4.8–10.8)

## 2019-04-27 RX ORDER — HYDROXYZINE HCL 10 MG
25 TABLET ORAL AT BEDTIME
Qty: 0 | Refills: 0 | Status: DISCONTINUED | OUTPATIENT
Start: 2019-04-27 | End: 2019-05-01

## 2019-04-27 RX ORDER — ONDANSETRON 8 MG/1
4 TABLET, FILM COATED ORAL EVERY 8 HOURS
Qty: 0 | Refills: 0 | Status: DISCONTINUED | OUTPATIENT
Start: 2019-04-27 | End: 2019-05-01

## 2019-04-27 RX ORDER — AMLODIPINE BESYLATE 2.5 MG/1
10 TABLET ORAL DAILY
Qty: 0 | Refills: 0 | Status: DISCONTINUED | OUTPATIENT
Start: 2019-04-27 | End: 2019-05-01

## 2019-04-27 RX ORDER — AMLODIPINE BESYLATE 2.5 MG/1
5 TABLET ORAL ONCE
Qty: 0 | Refills: 0 | Status: COMPLETED | OUTPATIENT
Start: 2019-04-27 | End: 2019-04-27

## 2019-04-27 RX ORDER — SENNA PLUS 8.6 MG/1
2 TABLET ORAL AT BEDTIME
Qty: 0 | Refills: 0 | Status: DISCONTINUED | OUTPATIENT
Start: 2019-04-27 | End: 2019-05-01

## 2019-04-27 RX ORDER — LACTULOSE 10 G/15ML
20 SOLUTION ORAL ONCE
Qty: 0 | Refills: 0 | Status: COMPLETED | OUTPATIENT
Start: 2019-04-27 | End: 2019-04-27

## 2019-04-27 RX ORDER — DOCUSATE SODIUM 100 MG
100 CAPSULE ORAL
Qty: 0 | Refills: 0 | Status: DISCONTINUED | OUTPATIENT
Start: 2019-04-27 | End: 2019-05-01

## 2019-04-27 RX ADMIN — Medication 25 MILLIGRAM(S): at 21:45

## 2019-04-27 RX ADMIN — CINACALCET 30 MILLIGRAM(S): 30 TABLET, FILM COATED ORAL at 11:19

## 2019-04-27 RX ADMIN — LOSARTAN POTASSIUM 50 MILLIGRAM(S): 100 TABLET, FILM COATED ORAL at 05:36

## 2019-04-27 RX ADMIN — Medication 100 MILLIGRAM(S): at 17:42

## 2019-04-27 RX ADMIN — AMLODIPINE BESYLATE 5 MILLIGRAM(S): 2.5 TABLET ORAL at 05:36

## 2019-04-27 RX ADMIN — CEFTRIAXONE 100 GRAM(S): 500 INJECTION, POWDER, FOR SOLUTION INTRAMUSCULAR; INTRAVENOUS at 09:05

## 2019-04-27 RX ADMIN — HEPARIN SODIUM 5000 UNIT(S): 5000 INJECTION INTRAVENOUS; SUBCUTANEOUS at 17:42

## 2019-04-27 RX ADMIN — AMLODIPINE BESYLATE 5 MILLIGRAM(S): 2.5 TABLET ORAL at 11:19

## 2019-04-27 RX ADMIN — ONDANSETRON 4 MILLIGRAM(S): 8 TABLET, FILM COATED ORAL at 18:44

## 2019-04-27 RX ADMIN — LACTULOSE 20 GRAM(S): 10 SOLUTION ORAL at 17:42

## 2019-04-27 RX ADMIN — AMLODIPINE BESYLATE 10 MILLIGRAM(S): 2.5 TABLET ORAL at 17:42

## 2019-04-27 RX ADMIN — Medication 81 MILLIGRAM(S): at 11:19

## 2019-04-27 RX ADMIN — HEPARIN SODIUM 5000 UNIT(S): 5000 INJECTION INTRAVENOUS; SUBCUTANEOUS at 05:36

## 2019-04-27 NOTE — PROGRESS NOTE ADULT - SUBJECTIVE AND OBJECTIVE BOX
Progress Note:  Provider Speciality                            Hospitalist      CARLA LARRY [id number] 89y Female     CHIEF PRESENTING COMPLAINT:  Patient is a 89y old  Female who presents with a chief complaint of dizziness, weakness (2019 21:48)        SUBJECTIVE:  Patient was seen and examined at bedside.   . No significant overnight events reported.     HISTORY OF PRESENTING ILLNESS:  HPI:  89yo female tells me she had dizziness in the morning but even though that symptom has resolved, she continues to have weakness. Notes that she generally is not eating as much as she used to. Denies headache or falls. 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  Heart: S1+S2 audible, no murmur  Lungs: bilateral  fair air entry, no wheezing, no crepitations.  Abdomen: Soft, non-tender, non-distended , no  rigidity or guarding.  CNS: AAOx2, CN  grossly intact.  Extremities:  No edema          REVIEW OF SYSTEMS:  Patient denies any headache, any vision complaints, runny nose, fever, chills, sore throat. Denies chest pain, shortness of breath, palpitation. Denies nausea, vomiting, abdominal pain, diarrhoea, Denies urinary burning, urgency, frequency, dysuria. Denies weakness in any part of the body or numbness.   At least 10 systems were reviewed in ROS. All systems reviewed  are within normal limits except for the complaints as described in Subjective.    CONSULTS:  Consultant(s) Notes Reviewed by me.   Care Discussed with Consultants/Other Providers where required.        MEDICATIONS:  MEDICATIONS  (STANDING):  aspirin enteric coated 81 milliGRAM(s) Oral daily  cefTRIAXone   IVPB 1 Gram(s) IV Intermittent every 24 hours  cinacalcet 30 milliGRAM(s) Oral daily  heparin  Injectable 5000 Unit(s) SubCutaneous every 12 hours  losartan 50 milliGRAM(s) Oral daily  sodium chloride 0.45%. 1000 milliLiter(s) (50 mL/Hr) IV Continuous <Continuous>    MEDICATIONS  (PRN):  benzocaine 15 mG/menthol 3.6 mG Lozenge 1 Lozenge Oral four times a day PRN Sore Throat      LABOROTORY DATA/MICROBIOLOGY/I & O's:                        13.6   5.00  )-----------( 144      ( 2019 06:53 )             41.5         139  |  103  |  28<H>  ----------------------------<  86  4.0   |  25  |  1.0    Ca    9.2      2019 06:53  Mg     1.9         TPro  6.6  /  Alb  3.8  /  TBili  0.5  /  DBili  x   /  AST  16  /  ALT  9   /  AlkPhos  68      PT/INR - ( 2019 17:20 )   PT: 11.50 sec;   INR: 1.00 ratio         PTT - ( 2019 17:20 )  PTT:27.5 sec  Urinalysis Basic - ( 2019 18:35 )    Color: Yellow / Appearance: Clear / S.020 / pH: x  Gluc: x / Ketone: Negative  / Bili: Negative / Urobili: 0.2 mg/dL   Blood: x / Protein: Trace mg/dL / Nitrite: Positive   Leuk Esterase: Small / RBC: 3-5 /HPF / WBC 6-10 /HPF   Sq Epi: x / Non Sq Epi: Few /HPF / Bacteria: Many      CAPILLARY BLOOD GLUCOSE      POCT Blood Glucose.: 82 mg/dL (2019 16:51)        Urinalysis Basic - ( 2019 18:35 )    Color: Yellow / Appearance: Clear / S.020 / pH: x  Gluc: x / Ketone: Negative  / Bili: Negative / Urobili: 0.2 mg/dL   Blood: x / Protein: Trace mg/dL / Nitrite: Positive   Leuk Esterase: Small / RBC: 3-5 /HPF / WBC 6-10 /HPF   Sq Epi: x / Non Sq Epi: Few /HPF / Bacteria: Many                    ASSESSMENT:    89yo female tells me she had dizziness and frequency of urine with associated geenralizaed wekness.  ASSESSMENT:  Principal Diagnosis:  ISMAEL on Chronic renal disease Stage  stage 3   Weakness & lethargy secondary to dehydration and possible cystitis /Urinary tract infection     Associated Active Comorbid Conditions:  HTN (hypertension)   Hyperthyroid.   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 Outpatient follow up   Continue with losartan as renal function shows improvement.  Speech & swallow evaluation   Hyperthyroid. -Continue Sensipar .Follow up TSh  Hypertension - not well controlled . added norvasc  PT/Rehab consult.   Goals of care.  GI Prophylaxis: Protonix 40mg PO QQdaily   DVT Prophylaxis: Heparin 5000 units sc q8hrs

## 2019-04-28 LAB
-  AMIKACIN: SIGNIFICANT CHANGE UP
-  AMPICILLIN/SULBACTAM: SIGNIFICANT CHANGE UP
-  AMPICILLIN: SIGNIFICANT CHANGE UP
-  AZTREONAM: SIGNIFICANT CHANGE UP
-  CEFAZOLIN: SIGNIFICANT CHANGE UP
-  CEFEPIME: SIGNIFICANT CHANGE UP
-  CEFOXITIN: SIGNIFICANT CHANGE UP
-  CEFTRIAXONE: SIGNIFICANT CHANGE UP
-  CIPROFLOXACIN: SIGNIFICANT CHANGE UP
-  ERTAPENEM: SIGNIFICANT CHANGE UP
-  GENTAMICIN: SIGNIFICANT CHANGE UP
-  IMIPENEM: SIGNIFICANT CHANGE UP
-  LEVOFLOXACIN: SIGNIFICANT CHANGE UP
-  MEROPENEM: SIGNIFICANT CHANGE UP
-  NITROFURANTOIN: SIGNIFICANT CHANGE UP
-  PIPERACILLIN/TAZOBACTAM: SIGNIFICANT CHANGE UP
-  TIGECYCLINE: SIGNIFICANT CHANGE UP
-  TOBRAMYCIN: SIGNIFICANT CHANGE UP
-  TRIMETHOPRIM/SULFAMETHOXAZOLE: SIGNIFICANT CHANGE UP
ANION GAP SERPL CALC-SCNC: 12 MMOL/L — SIGNIFICANT CHANGE UP (ref 7–14)
BUN SERPL-MCNC: 30 MG/DL — HIGH (ref 10–20)
CALCIUM SERPL-MCNC: 9.4 MG/DL — SIGNIFICANT CHANGE UP (ref 8.5–10.1)
CHLORIDE SERPL-SCNC: 104 MMOL/L — SIGNIFICANT CHANGE UP (ref 98–110)
CO2 SERPL-SCNC: 25 MMOL/L — SIGNIFICANT CHANGE UP (ref 17–32)
CREAT SERPL-MCNC: 1.3 MG/DL — SIGNIFICANT CHANGE UP (ref 0.7–1.5)
CULTURE RESULTS: SIGNIFICANT CHANGE UP
GLUCOSE SERPL-MCNC: 93 MG/DL — SIGNIFICANT CHANGE UP (ref 70–99)
HCT VFR BLD CALC: 40.3 % — SIGNIFICANT CHANGE UP (ref 37–47)
HGB BLD-MCNC: 13.1 G/DL — SIGNIFICANT CHANGE UP (ref 12–16)
MCHC RBC-ENTMCNC: 28.7 PG — SIGNIFICANT CHANGE UP (ref 27–31)
MCHC RBC-ENTMCNC: 32.5 G/DL — SIGNIFICANT CHANGE UP (ref 32–37)
MCV RBC AUTO: 88.2 FL — SIGNIFICANT CHANGE UP (ref 81–99)
METHOD TYPE: SIGNIFICANT CHANGE UP
NRBC # BLD: 0 /100 WBCS — SIGNIFICANT CHANGE UP (ref 0–0)
ORGANISM # SPEC MICROSCOPIC CNT: SIGNIFICANT CHANGE UP
ORGANISM # SPEC MICROSCOPIC CNT: SIGNIFICANT CHANGE UP
PLATELET # BLD AUTO: 132 K/UL — SIGNIFICANT CHANGE UP (ref 130–400)
POTASSIUM SERPL-MCNC: 4.6 MMOL/L — SIGNIFICANT CHANGE UP (ref 3.5–5)
POTASSIUM SERPL-SCNC: 4.6 MMOL/L — SIGNIFICANT CHANGE UP (ref 3.5–5)
RBC # BLD: 4.57 M/UL — SIGNIFICANT CHANGE UP (ref 4.2–5.4)
RBC # FLD: 13.1 % — SIGNIFICANT CHANGE UP (ref 11.5–14.5)
SODIUM SERPL-SCNC: 141 MMOL/L — SIGNIFICANT CHANGE UP (ref 135–146)
SPECIMEN SOURCE: SIGNIFICANT CHANGE UP
WBC # BLD: 6.31 K/UL — SIGNIFICANT CHANGE UP (ref 4.8–10.8)
WBC # FLD AUTO: 6.31 K/UL — SIGNIFICANT CHANGE UP (ref 4.8–10.8)

## 2019-04-28 RX ORDER — HYDROXYZINE HCL 10 MG
25 TABLET ORAL ONCE
Qty: 0 | Refills: 0 | Status: COMPLETED | OUTPATIENT
Start: 2019-04-28 | End: 2019-04-29

## 2019-04-28 RX ORDER — LABETALOL HCL 100 MG
100 TABLET ORAL
Qty: 0 | Refills: 0 | Status: DISCONTINUED | OUTPATIENT
Start: 2019-04-28 | End: 2019-05-01

## 2019-04-28 RX ADMIN — Medication 100 MILLIGRAM(S): at 17:44

## 2019-04-28 RX ADMIN — Medication 81 MILLIGRAM(S): at 11:18

## 2019-04-28 RX ADMIN — CINACALCET 30 MILLIGRAM(S): 30 TABLET, FILM COATED ORAL at 11:18

## 2019-04-28 RX ADMIN — AMLODIPINE BESYLATE 10 MILLIGRAM(S): 2.5 TABLET ORAL at 06:35

## 2019-04-28 RX ADMIN — HEPARIN SODIUM 5000 UNIT(S): 5000 INJECTION INTRAVENOUS; SUBCUTANEOUS at 17:44

## 2019-04-28 RX ADMIN — HEPARIN SODIUM 5000 UNIT(S): 5000 INJECTION INTRAVENOUS; SUBCUTANEOUS at 06:36

## 2019-04-28 RX ADMIN — CEFTRIAXONE 100 GRAM(S): 500 INJECTION, POWDER, FOR SOLUTION INTRAMUSCULAR; INTRAVENOUS at 09:32

## 2019-04-28 RX ADMIN — Medication 100 MILLIGRAM(S): at 06:36

## 2019-04-28 RX ADMIN — LOSARTAN POTASSIUM 50 MILLIGRAM(S): 100 TABLET, FILM COATED ORAL at 06:36

## 2019-04-28 NOTE — PROGRESS NOTE ADULT - SUBJECTIVE AND OBJECTIVE BOX
Progress Note:  Provider Speciality                            Hospitalist      CARLA LARRY [id number] 89y Female     CHIEF PRESENTING COMPLAINT:  Patient is a 89y old  Female who presents with a chief complaint of dizziness, weakness (27 Apr 2019 10:46)        SUBJECTIVE:  Patient was seen and examined at bedside. Reports improvement in  presenting complaint. No significant overnight events reported.     HISTORY OF PRESENTING ILLNESS:  HPI:  89yo female tells me she had dizziness in the morning but even though that symptom has resolved, she continues to have weakness. Notes that she generally is not eating as much as she used to. Denies headache or falls. No modifying factors readily identified (25 Apr 2019 21:48)      PAST MEDICAL & SURGICAL HISTORY:  PAST MEDICAL & SURGICAL HISTORY:  Hypothyroid  HTN (hypertension)  H/O carpal tunnel repair: B/L  H/O total knee replacement, bilateral  History of hip replacement, total, left: approx 10/2013      VITAL SIGNS:  Vital Signs Last 24 Hrs  T(C): 37.3 (28 Apr 2019 10:28), Max: 37.3 (28 Apr 2019 10:28)  T(F): 99.1 (28 Apr 2019 10:28), Max: 99.1 (28 Apr 2019 10:28)  HR: 80 (28 Apr 2019 05:15) (80 - 82)  BP: 148/67 (28 Apr 2019 05:15) (116/56 - 148/67)  BP(mean): --  RR: 16 (28 Apr 2019 10:28) (16 - 16)  SpO2: --    PHYSICAL EXAMINATION:  Not in acute distress  General: No pallor, or icterus, afebrile  HEENT:  BORA, EOMI, no JVD, no Bruit.  Heart: S1+S2 audible, no murmur  Lungs: bilateral  fair air entry, no wheezing, no crepitations.  Abdomen: Soft, non-tender, non-distended , no  rigidity or guarding.  CNS: AAOx3, CN  grossly intact.  Extremities:  No edema          REVIEW OF SYSTEMS:  Patient denies any headache, any vision complaints, runny nose, fever, chills, sore throat. Denies chest pain, shortness of breath, palpitation. Denies nausea, vomiting, abdominal pain, diarrhoea, Denies urinary burning, urgency, frequency, dysuria. Denies weakness in any part of the body or numbness.   At least 10 systems were reviewed in ROS. All systems reviewed  are within normal limits except for the complaints as described in Subjective.    CONSULTS:  Consultant(s) Notes Reviewed by me.   Care Discussed with Consultants/Other Providers where required.        MEDICATIONS:  MEDICATIONS  (STANDING):  amLODIPine   Tablet 10 milliGRAM(s) Oral daily  aspirin enteric coated 81 milliGRAM(s) Oral daily  cefTRIAXone   IVPB 1 Gram(s) IV Intermittent every 24 hours  cinacalcet 30 milliGRAM(s) Oral daily  docusate sodium 100 milliGRAM(s) Oral two times a day  heparin  Injectable 5000 Unit(s) SubCutaneous every 12 hours  labetalol 100 milliGRAM(s) Oral two times a day  senna 2 Tablet(s) Oral at bedtime    MEDICATIONS  (PRN):  benzocaine 15 mG/menthol 3.6 mG Lozenge 1 Lozenge Oral four times a day PRN Sore Throat  hydrOXYzine hydrochloride Injectable 25 milliGRAM(s) IntraMuscular at bedtime PRN Agitation  ondansetron Injectable 4 milliGRAM(s) IV Push every 8 hours PRN Nausea and/or Vomiting      LABOROTORY DATA/MICROBIOLOGY/I & O's:                        13.1   6.31  )-----------( 132      ( 28 Apr 2019 07:00 )             40.3     04-28    141  |  104  |  30<H>  ----------------------------<  93  4.6   |  25  |  1.3    Ca    9.4      28 Apr 2019 07:00          CAPILLARY BLOOD GLUCOSE                            ASSESSMENT:        89yo female tells me she had dizziness and frequency of urine with associated geenralizaed wekness.  ASSESSMENT:  Principal Diagnosis:  ISMAEL on Chronic renal disease Stage  stage 3   Weakness & lethargy secondary to dehydration and possible cystitis /Urinary tract infection     Associated Active Comorbid Conditions:  HTN (hypertension)   Hyperthyroid.   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 Outpatient follow up   Hypertension - not well controlled. Discontinue losartan and switch to labetaolol. Norvasc increased to 10 milligrams once daily   Speech & swallow evaluation   Hyperthyroid. -Continue Sensipar .Follow up TSh  Hypertension - not well controlled . added norvasc  PT/Rehab consult.   Goals of care.  GI Prophylaxis: Protonix 40mg PO QQdaily   DVT Prophylaxis: Heparin 5000 units sc q8hrs    Discharge Disposition: anticipate discharge tomorrow to home with home care, Family refused STR

## 2019-04-29 LAB
ANION GAP SERPL CALC-SCNC: 11 MMOL/L — SIGNIFICANT CHANGE UP (ref 7–14)
ANION GAP SERPL CALC-SCNC: 12 MMOL/L — SIGNIFICANT CHANGE UP (ref 7–14)
BUN SERPL-MCNC: 40 MG/DL — HIGH (ref 10–20)
BUN SERPL-MCNC: 46 MG/DL — HIGH (ref 10–20)
CALCIUM SERPL-MCNC: 9.3 MG/DL — SIGNIFICANT CHANGE UP (ref 8.5–10.1)
CALCIUM SERPL-MCNC: 9.9 MG/DL — SIGNIFICANT CHANGE UP (ref 8.5–10.1)
CHLORIDE SERPL-SCNC: 101 MMOL/L — SIGNIFICANT CHANGE UP (ref 98–110)
CHLORIDE SERPL-SCNC: 98 MMOL/L — SIGNIFICANT CHANGE UP (ref 98–110)
CO2 SERPL-SCNC: 28 MMOL/L — SIGNIFICANT CHANGE UP (ref 17–32)
CO2 SERPL-SCNC: 28 MMOL/L — SIGNIFICANT CHANGE UP (ref 17–32)
CREAT SERPL-MCNC: 1.3 MG/DL — SIGNIFICANT CHANGE UP (ref 0.7–1.5)
CREAT SERPL-MCNC: 1.6 MG/DL — HIGH (ref 0.7–1.5)
GLUCOSE SERPL-MCNC: 124 MG/DL — HIGH (ref 70–99)
GLUCOSE SERPL-MCNC: 94 MG/DL — SIGNIFICANT CHANGE UP (ref 70–99)
HCT VFR BLD CALC: 38.5 % — SIGNIFICANT CHANGE UP (ref 37–47)
HGB BLD-MCNC: 12.4 G/DL — SIGNIFICANT CHANGE UP (ref 12–16)
MCHC RBC-ENTMCNC: 28.5 PG — SIGNIFICANT CHANGE UP (ref 27–31)
MCHC RBC-ENTMCNC: 32.2 G/DL — SIGNIFICANT CHANGE UP (ref 32–37)
MCV RBC AUTO: 88.5 FL — SIGNIFICANT CHANGE UP (ref 81–99)
NRBC # BLD: 0 /100 WBCS — SIGNIFICANT CHANGE UP (ref 0–0)
PLATELET # BLD AUTO: 158 K/UL — SIGNIFICANT CHANGE UP (ref 130–400)
POTASSIUM SERPL-MCNC: 4.7 MMOL/L — SIGNIFICANT CHANGE UP (ref 3.5–5)
POTASSIUM SERPL-MCNC: 4.9 MMOL/L — SIGNIFICANT CHANGE UP (ref 3.5–5)
POTASSIUM SERPL-SCNC: 4.7 MMOL/L — SIGNIFICANT CHANGE UP (ref 3.5–5)
POTASSIUM SERPL-SCNC: 4.9 MMOL/L — SIGNIFICANT CHANGE UP (ref 3.5–5)
RBC # BLD: 4.35 M/UL — SIGNIFICANT CHANGE UP (ref 4.2–5.4)
RBC # FLD: 13.2 % — SIGNIFICANT CHANGE UP (ref 11.5–14.5)
SODIUM SERPL-SCNC: 137 MMOL/L — SIGNIFICANT CHANGE UP (ref 135–146)
SODIUM SERPL-SCNC: 141 MMOL/L — SIGNIFICANT CHANGE UP (ref 135–146)
WBC # BLD: 6.16 K/UL — SIGNIFICANT CHANGE UP (ref 4.8–10.8)
WBC # FLD AUTO: 6.16 K/UL — SIGNIFICANT CHANGE UP (ref 4.8–10.8)

## 2019-04-29 RX ORDER — SODIUM CHLORIDE 9 MG/ML
1000 INJECTION, SOLUTION INTRAVENOUS
Qty: 0 | Refills: 0 | Status: DISCONTINUED | OUTPATIENT
Start: 2019-04-29 | End: 2019-04-30

## 2019-04-29 RX ADMIN — SENNA PLUS 2 TABLET(S): 8.6 TABLET ORAL at 21:07

## 2019-04-29 RX ADMIN — Medication 100 MILLIGRAM(S): at 17:55

## 2019-04-29 RX ADMIN — AMLODIPINE BESYLATE 10 MILLIGRAM(S): 2.5 TABLET ORAL at 05:36

## 2019-04-29 RX ADMIN — SODIUM CHLORIDE 75 MILLILITER(S): 9 INJECTION, SOLUTION INTRAVENOUS at 10:41

## 2019-04-29 RX ADMIN — CINACALCET 30 MILLIGRAM(S): 30 TABLET, FILM COATED ORAL at 11:51

## 2019-04-29 RX ADMIN — Medication 100 MILLIGRAM(S): at 05:36

## 2019-04-29 RX ADMIN — CEFTRIAXONE 100 GRAM(S): 500 INJECTION, POWDER, FOR SOLUTION INTRAMUSCULAR; INTRAVENOUS at 11:50

## 2019-04-29 RX ADMIN — Medication 81 MILLIGRAM(S): at 11:50

## 2019-04-29 RX ADMIN — Medication 100 MILLIGRAM(S): at 05:35

## 2019-04-29 RX ADMIN — HEPARIN SODIUM 5000 UNIT(S): 5000 INJECTION INTRAVENOUS; SUBCUTANEOUS at 17:55

## 2019-04-29 RX ADMIN — HEPARIN SODIUM 5000 UNIT(S): 5000 INJECTION INTRAVENOUS; SUBCUTANEOUS at 05:36

## 2019-04-29 RX ADMIN — Medication 25 MILLIGRAM(S): at 21:07

## 2019-04-29 NOTE — CONSULT NOTE ADULT - ASSESSMENT
ASSESSMENT  89y F admitted with DIZZINESS      PROBLEMS  1. E coli UTI S cefazolin, R Cipro, Bactrim  acute illness with systemic symptoms    On cefTRIAXone   IVPB 1 Gram(s) IV Intermittent every 24 hours    2. Atelectasis on Cxray    3. Hypothyroid    4. hypertension    PLAN  Consider switch to Keflex 500mg PO q6h x 5 days

## 2019-04-29 NOTE — CONSULT NOTE ADULT - SUBJECTIVE AND OBJECTIVE BOX
KENDYCARLA  89y, Female  Allergy: No Known Allergies      CHIEF COMPLAINT: dizziness, weakness (28 Apr 2019 11:07)      HPI:  89yo female tells me she had dizziness in the morning but even though that symptom has resolved, she continues to have weakness. Notes that she generally is not eating as much as she used to. Denies headache or falls. 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      ROS  General: Denies fevers, chills, nightsweats, weight loss  HEENT: Denies headache, rhinorrhea, sore throat, eye pain  CV: Denies CP, palpitations  PULM: Denies SOB, cough  GI: Denies abdominal pain, diarrhea  : Denies dysuria, hematuria  MSK: Denies arthralgias  SKIN: Denies skin   NEURO: Denies paresthesias, weakness  PSYCH: Denies depression    VITALS:  T(F): 97, Max: 99.4 (04-28-19 @ 14:06)  HR: 74  BP: 133/61  RR: 16Vital Signs Last 24 Hrs  T(C): 36.1 (29 Apr 2019 05:34), Max: 37.4 (28 Apr 2019 14:06)  T(F): 97 (29 Apr 2019 05:34), Max: 99.4 (28 Apr 2019 14:06)  HR: 74 (29 Apr 2019 05:34) (74 - 87)  BP: 133/61 (29 Apr 2019 05:34) (104/51 - 133/61)  BP(mean): --  RR: 16 (29 Apr 2019 05:34) (16 - 16)  SpO2: --    PHYSICAL EXAM:  Gen: NAD, resting in bed  HEENT: Normocephalic, atraumatic  Neck: supple, no lymphadenopathy  CV: Regular rate & regular rhythm  Lungs: decreased BS at bases, no fremitus  Abdomen: Soft, BS present  Ext: Warm, well perfused  Neuro: non focal, awake  Skin: no rash, no erythema      TESTS & MEASUREMENTS:                        12.4   6.16  )-----------( 158      ( 29 Apr 2019 06:45 )             38.5     04-29    141  |  101  |  40<H>  ----------------------------<  94  4.7   |  28  |  1.6<H>    Ca    9.3      29 Apr 2019 06:45      eGFR if Non African American: 27 mL/min/1.73M2 (04-29-19 @ 06:45)  eGFR if : 31 mL/min/1.73M2 (04-29-19 @ 06:45)          Culture - Urine (collected 04-25-19 @ 18:35)  Source: .Urine Clean Catch (Midstream)  Final Report (04-28-19 @ 09:51):    >100,000 CFU/ml Escherichia coli    <10,000 CFU/ml Normal Urogenital nimesh present  Organism: Escherichia coli (04-28-19 @ 09:51)  Organism: Escherichia coli (04-28-19 @ 09:51)      -  Amikacin: S <=16      -  Ampicillin: R >16 These ampicillin results predict results for amoxicillin      -  Ampicillin/Sulbactam: R >16/8      -  Aztreonam: S <=4      -  Cefazolin: S <=8 For uncomplicated UTI with K. pneumoniae, E. coli, or P. mirablis: JOE <=16 is sensitive and JOE >=32 is resistant. This also predicts results for oral agents cefaclor, cefdinir, cefpodoxime, cefprozil, cefuroxime axetil, cephalexin and locarbef for uncomplicated UTI. Note that some isolates may be susceptible to these agents while testing resistant to cefazolin.      -  Cefepime: S <=4      -  Cefoxitin: S <=8      -  Ceftriaxone: S <=1 Enterobacter, Citrobacter, and Serratia may develop resistance during prolonged therapy      -  Ciprofloxacin: R >2      -  Ertapenem: S <=1      -  Gentamicin: R >8      -  Imipenem: S <=1      -  Levofloxacin: R >4      -  Meropenem: S <=1      -  Nitrofurantoin: S <=32 Should not be used to treat pyelonephritis      -  Piperacillin/Tazobactam: S <=16      -  Tigecycline: S <=2      -  Tobramycin: S <=4      -  Trimethoprim/Sulfamethoxazole: R >2/38      Method Type: [facility]            INFECTIOUS DISEASES TESTING      RADIOLOGY & ADDITIONAL TESTS:  I have personally reviewed the last Chest xray  CXR Minimal bibasilar atelectasis.      CT head: No CT evidence of acute territorial infarct, intracranial hemorrhage, or   mass effect.      CARDIOLOGY TESTING  12 Lead ECG:   Ventricular Rate 73 BPM    Atrial Rate 73 BPM    P-R Interval 144 ms    QRS Duration 78 ms    Q-T Interval 442 ms    QTC Calculation(Bezet) 486 ms    P Axis 53 degrees    R Axis 2 degrees    T Axis 67 degrees    Diagnosis Line Normal sinus rhythm  Possible Left atrial enlargement  Left ventricular hypertrophy  Anterior infarct , age undetermined  Abnormal ECG    Confirmed by FIONA SMITH MD ([id number]) on 4/25/2019 9:30:49 PM (04-25-19 @ 16:49)      ANTIBIOTICS:  cefTRIAXone   IVPB 1 Gram(s) IV Intermittent every 24 hours      MEDICATIONS  amLODIPine   Tablet 10  aspirin enteric coated 81  cefTRIAXone   IVPB 1  cinacalcet 30  docusate sodium 100  heparin  Injectable 5000  hydrOXYzine hydrochloride 25  labetalol 100  senna 2  sodium chloride 0.45%. 1000

## 2019-04-29 NOTE — PROGRESS NOTE ADULT - SUBJECTIVE AND OBJECTIVE BOX
Progress Note:  Provider Speciality                            Hospitalist      CARLA LARRY [id number] 89y Female     CHIEF PRESENTING COMPLAINT:  Patient is a 89y old  Female who presents with a chief complaint of dizziness, weakness (27 Apr 2019 10:46)        SUBJECTIVE:  Patient was seen and examined at bedside. Reports improvement in  presenting complaint. No significant overnight events reported.     HISTORY OF PRESENTING ILLNESS:  HPI:  89yo female tells me she had dizziness in the morning but even though that symptom has resolved, she continues to have weakness. Notes that she generally is not eating as much as she used to. Denies headache or falls. No modifying factors readily identified (25 Apr 2019 21:48)      PAST MEDICAL & SURGICAL HISTORY:  PAST MEDICAL & SURGICAL HISTORY:  Hypothyroid  HTN (hypertension)  H/O carpal tunnel repair: B/L  H/O total knee replacement, bilateral  History of hip replacement, total, left: approx 10/2013      VITAL SIGNS:  Vital Signs Last 24 Hrs  T(C): 37.3 (28 Apr 2019 10:28), Max: 37.3 (28 Apr 2019 10:28)  T(F): 99.1 (28 Apr 2019 10:28), Max: 99.1 (28 Apr 2019 10:28)  HR: 80 (28 Apr 2019 05:15) (80 - 82)  BP: 148/67 (28 Apr 2019 05:15) (116/56 - 148/67)  BP(mean): --  RR: 16 (28 Apr 2019 10:28) (16 - 16)  SpO2: --    PHYSICAL EXAMINATION:  Not in acute distress  General: No pallor, or icterus, afebrile  HEENT:  BORA, EOMI, no JVD, no Bruit.  Heart: S1+S2 audible, no murmur  Lungs: bilateral  fair air entry, no wheezing, no crepitations.  Abdomen: Soft, non-tender, non-distended , no  rigidity or guarding.  CNS: AAOx3, CN  grossly intact.  Extremities:  No edema          REVIEW OF SYSTEMS:  Patient denies any headache, any vision complaints, runny nose, fever, chills, sore throat. Denies chest pain, shortness of breath, palpitation. Denies nausea, vomiting, abdominal pain, diarrhoea, Denies urinary burning, urgency, frequency, dysuria. Denies weakness in any part of the body or numbness.   At least 10 systems were reviewed in ROS. All systems reviewed  are within normal limits except for the complaints as described in Subjective.    CONSULTS:  Consultant(s) Notes Reviewed by me.   Care Discussed with Consultants/Other Providers where required.        MEDICATIONS:  MEDICATIONS  (STANDING):  amLODIPine   Tablet 10 milliGRAM(s) Oral daily  aspirin enteric coated 81 milliGRAM(s) Oral daily  cefTRIAXone   IVPB 1 Gram(s) IV Intermittent every 24 hours  cinacalcet 30 milliGRAM(s) Oral daily  docusate sodium 100 milliGRAM(s) Oral two times a day  heparin  Injectable 5000 Unit(s) SubCutaneous every 12 hours  labetalol 100 milliGRAM(s) Oral two times a day  senna 2 Tablet(s) Oral at bedtime    MEDICATIONS  (PRN):  benzocaine 15 mG/menthol 3.6 mG Lozenge 1 Lozenge Oral four times a day PRN Sore Throat  hydrOXYzine hydrochloride Injectable 25 milliGRAM(s) IntraMuscular at bedtime PRN Agitation  ondansetron Injectable 4 milliGRAM(s) IV Push every 8 hours PRN Nausea and/or Vomiting      LABOROTORY DATA/MICROBIOLOGY/I & O's:                        13.1   6.31  )-----------( 132      ( 28 Apr 2019 07:00 )             40.3     04-28    141  |  104  |  30<H>  ----------------------------<  93  4.6   |  25  |  1.3    Ca    9.4      28 Apr 2019 07:00          CAPILLARY BLOOD GLUCOSE                            ASSESSMENT:        89yo female tells me she had dizziness and frequency of urine with associated geenralizaed wekness.  ASSESSMENT:  Principal Diagnosis:  ISMAEL on Chronic renal disease Stage  stage 3   Weakness & lethargy secondary to Urinary tract infection secondary to E coli    Associated Active Comorbid Conditions:  HTN (hypertension)   Hyperthyroid.   H/O total knee replacement, bilateral   History of hip replacement, total, left    PLAN:    Empiric coverage with Rocephin  UA is positive , foU cx grows E coli, follow up susceptibility. 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 Outpatient follow up   Hypertension - not well controlled. Discontinue losartan and switch to labetaolol. Norvasc increased to 10 milligrams once daily   Speech & swallow evaluation   Hyperthyroid. -Continue Sensipar .Follow up TSh  Hypertension - not well controlled . added norvasc  PT/Rehab consult.   Goals of care.  GI Prophylaxis: Protonix 40mg PO QQdaily   DVT Prophylaxis: Heparin 5000 units sc q8hrs    Discharge Disposition: Family refuse STR earlier , now agreeable to STR, pending placement. Medically stable for discharge

## 2019-04-30 ENCOUNTER — TRANSCRIPTION ENCOUNTER (OUTPATIENT)
Age: 84
End: 2019-04-30

## 2019-04-30 DIAGNOSIS — I10 ESSENTIAL (PRIMARY) HYPERTENSION: ICD-10-CM

## 2019-04-30 DIAGNOSIS — N18.9 CHRONIC KIDNEY DISEASE, UNSPECIFIED: ICD-10-CM

## 2019-04-30 DIAGNOSIS — R94.31 ABNORMAL ELECTROCARDIOGRAM [ECG] [EKG]: ICD-10-CM

## 2019-04-30 DIAGNOSIS — N39.0 URINARY TRACT INFECTION, SITE NOT SPECIFIED: ICD-10-CM

## 2019-04-30 LAB
ANION GAP SERPL CALC-SCNC: 12 MMOL/L — SIGNIFICANT CHANGE UP (ref 7–14)
BUN SERPL-MCNC: 39 MG/DL — HIGH (ref 10–20)
CALCIUM SERPL-MCNC: 9.4 MG/DL — SIGNIFICANT CHANGE UP (ref 8.5–10.1)
CHLORIDE SERPL-SCNC: 100 MMOL/L — SIGNIFICANT CHANGE UP (ref 98–110)
CO2 SERPL-SCNC: 29 MMOL/L — SIGNIFICANT CHANGE UP (ref 17–32)
CREAT SERPL-MCNC: 1.2 MG/DL — SIGNIFICANT CHANGE UP (ref 0.7–1.5)
GLUCOSE SERPL-MCNC: 95 MG/DL — SIGNIFICANT CHANGE UP (ref 70–99)
HCT VFR BLD CALC: 37.4 % — SIGNIFICANT CHANGE UP (ref 37–47)
HGB BLD-MCNC: 12.2 G/DL — SIGNIFICANT CHANGE UP (ref 12–16)
MCHC RBC-ENTMCNC: 28.4 PG — SIGNIFICANT CHANGE UP (ref 27–31)
MCHC RBC-ENTMCNC: 32.6 G/DL — SIGNIFICANT CHANGE UP (ref 32–37)
MCV RBC AUTO: 87 FL — SIGNIFICANT CHANGE UP (ref 81–99)
NRBC # BLD: 0 /100 WBCS — SIGNIFICANT CHANGE UP (ref 0–0)
PLATELET # BLD AUTO: 147 K/UL — SIGNIFICANT CHANGE UP (ref 130–400)
POTASSIUM SERPL-MCNC: 4.8 MMOL/L — SIGNIFICANT CHANGE UP (ref 3.5–5)
POTASSIUM SERPL-SCNC: 4.8 MMOL/L — SIGNIFICANT CHANGE UP (ref 3.5–5)
RBC # BLD: 4.3 M/UL — SIGNIFICANT CHANGE UP (ref 4.2–5.4)
RBC # FLD: 13.1 % — SIGNIFICANT CHANGE UP (ref 11.5–14.5)
SODIUM SERPL-SCNC: 141 MMOL/L — SIGNIFICANT CHANGE UP (ref 135–146)
WBC # BLD: 5.66 K/UL — SIGNIFICANT CHANGE UP (ref 4.8–10.8)
WBC # FLD AUTO: 5.66 K/UL — SIGNIFICANT CHANGE UP (ref 4.8–10.8)

## 2019-04-30 RX ORDER — AMLODIPINE BESYLATE 2.5 MG/1
1 TABLET ORAL
Qty: 0 | Refills: 0 | COMMUNITY

## 2019-04-30 RX ORDER — DOCUSATE SODIUM 100 MG
1 CAPSULE ORAL
Qty: 60 | Refills: 0 | OUTPATIENT
Start: 2019-04-30 | End: 2019-05-29

## 2019-04-30 RX ORDER — LABETALOL HCL 100 MG
1 TABLET ORAL
Qty: 60 | Refills: 0 | OUTPATIENT
Start: 2019-04-30 | End: 2019-05-29

## 2019-04-30 RX ORDER — CEFUROXIME AXETIL 250 MG
1 TABLET ORAL
Qty: 10 | Refills: 0 | OUTPATIENT
Start: 2019-04-30 | End: 2019-05-04

## 2019-04-30 RX ORDER — AMLODIPINE BESYLATE 2.5 MG/1
1 TABLET ORAL
Qty: 0 | Refills: 0 | COMMUNITY
Start: 2019-04-30

## 2019-04-30 RX ORDER — LOSARTAN POTASSIUM 100 MG/1
50 TABLET, FILM COATED ORAL
Qty: 0 | Refills: 0 | COMMUNITY

## 2019-04-30 RX ORDER — CEFUROXIME AXETIL 250 MG
1 TABLET ORAL
Qty: 0 | Refills: 0 | COMMUNITY
Start: 2019-04-30

## 2019-04-30 RX ORDER — CEFUROXIME AXETIL 250 MG
250 TABLET ORAL EVERY 12 HOURS
Qty: 0 | Refills: 0 | Status: DISCONTINUED | OUTPATIENT
Start: 2019-04-30 | End: 2019-05-01

## 2019-04-30 RX ORDER — HYDROXYZINE HCL 10 MG
25 TABLET ORAL ONCE
Qty: 0 | Refills: 0 | Status: COMPLETED | OUTPATIENT
Start: 2019-04-30 | End: 2019-04-30

## 2019-04-30 RX ORDER — AMLODIPINE BESYLATE 2.5 MG/1
1 TABLET ORAL
Qty: 30 | Refills: 0 | OUTPATIENT
Start: 2019-04-30 | End: 2019-05-29

## 2019-04-30 RX ADMIN — SENNA PLUS 2 TABLET(S): 8.6 TABLET ORAL at 21:29

## 2019-04-30 RX ADMIN — CINACALCET 30 MILLIGRAM(S): 30 TABLET, FILM COATED ORAL at 11:27

## 2019-04-30 RX ADMIN — HEPARIN SODIUM 5000 UNIT(S): 5000 INJECTION INTRAVENOUS; SUBCUTANEOUS at 06:01

## 2019-04-30 RX ADMIN — Medication 81 MILLIGRAM(S): at 11:27

## 2019-04-30 RX ADMIN — Medication 100 MILLIGRAM(S): at 17:43

## 2019-04-30 RX ADMIN — Medication 25 MILLIGRAM(S): at 21:33

## 2019-04-30 RX ADMIN — Medication 100 MILLIGRAM(S): at 06:01

## 2019-04-30 RX ADMIN — HEPARIN SODIUM 5000 UNIT(S): 5000 INJECTION INTRAVENOUS; SUBCUTANEOUS at 17:44

## 2019-04-30 RX ADMIN — SODIUM CHLORIDE 75 MILLILITER(S): 9 INJECTION, SOLUTION INTRAVENOUS at 06:02

## 2019-04-30 RX ADMIN — AMLODIPINE BESYLATE 10 MILLIGRAM(S): 2.5 TABLET ORAL at 06:01

## 2019-04-30 RX ADMIN — Medication 250 MILLIGRAM(S): at 17:43

## 2019-04-30 NOTE — DISCHARGE NOTE PROVIDER - HOSPITAL COURSE
89yo female reported to ER with c/o  dizziness , bachacle and increased frequency of urination. , she continued to have weakness. Notes that she generally is not eating as much as she used to. Denies headache or falls. No modifying factors readily identified. She was also found to have ISMAEL on ckd stage 3. She empirically started on rocephin and hydration for ISMAEL. Her urine cx came back positive for E coli. ID was consulted and as per recs pt can be discharged on keflex for 5 days. Pt was seen by PT but family refused STR and wants pt to be discharged to home only. She would require home pt upon discharge. 89yo female reported to ER with c/o  dizziness , bachacle and increased frequency of urination. , she continued to have weakness. Notes that she generally is not eating as much as she used to. Denies headache or falls. No modifying factors readily identified. She was also found to have ISMAEL on ckd stage 3. She empirically started on rocephin and hydration for ISMAEL. Her urine cx came back positive for E coli. ID was consulted and as per recs pt can be discharged on ceftin for 5 days. Her blood pressure was not well controlled . Discontinued losartan .Increased dose of norvasc to 10 milligrams once daily .Added labetalol 100 milligrams twice daily for better control. Now blood pressure well controlled at time of discharge Pt was seen by PT but family refused STR and wants pt to be discharged to home only. She would require home pt/home care upon discharge. 89yo female reported to ER with c/o  dizziness , bachacle and increased frequency of urination. , she continued to have weakness. Notes that she generally is not eating as much as she used to. Denies headache or falls. No modifying factors readily identified. She was also found to have ISMAEL on ckd stage 3. She empirically started on rocephin and hydration for ISMAEL. Her urine cx came back positive for E coli. ID was consulted and as per recs pt can be discharged on ceftin for 5 days. Her blood pressure was not well controlled . Discontinued losartan .Increased dose of norvasc to 10 milligrams once daily .Added labetalol 100 milligrams twice daily for better control. Now blood pressure well controlled at time of discharge Pt was seen by PT but family refused STR and wants pt to be discharged to home only. She would require home pt/home care upon discharge.    Attending Attestation:    Patient was seen independently. Latest vital signs,  imaging studies and labs were reviewed. Case was discussed with housestaff for assessment and plan.  Patient is medically stable for discharge to home. About 35 mins spent on discharge disposition.

## 2019-04-30 NOTE — DISCHARGE NOTE PROVIDER - NSDCCPCAREPLAN_GEN_ALL_CORE_FT
PRINCIPAL DISCHARGE DIAGNOSIS  Diagnosis: Dizziness  Assessment and Plan of Treatment: Resolved      SECONDARY DISCHARGE DIAGNOSES  Diagnosis: Uncontrolled hypertension  Assessment and Plan of Treatment: discontinued losartan  Increased dose of norvasc to 10 milligrams once daily   added labetalol 100 milligrams twice daily   Now blood pressure well controlled at time of discharge    Diagnosis: UTI (urinary tract infection)  Assessment and Plan of Treatment: secondary to E coli  treated with Rocephin IN inpatient  Ceftin 250 milligrams twice daily x 5 days

## 2019-04-30 NOTE — PROGRESS NOTE ADULT - SUBJECTIVE AND OBJECTIVE BOX
infectious diseases progress note:  CARLA LARRY is a 89yFemale patient    DIZZINESS    Dehydration  Abnormal CT of brain  UTI (urinary tract infection)  Hypothyroid  HTN (hypertension)  Dizziness      ROS:  not relevant    Allergies    No Known Allergies    Intolerances        ANTIBIOTICS/RELEVANT:  antimicrobials  cefuroxime   Tablet 250 milliGRAM(s) Oral every 12 hours    immunologic:    OTHER:  amLODIPine   Tablet 10 milliGRAM(s) Oral daily  aspirin enteric coated 81 milliGRAM(s) Oral daily  benzocaine 15 mG/menthol 3.6 mG Lozenge 1 Lozenge Oral four times a day PRN  cinacalcet 30 milliGRAM(s) Oral daily  docusate sodium 100 milliGRAM(s) Oral two times a day  heparin  Injectable 5000 Unit(s) SubCutaneous every 12 hours  hydrOXYzine hydrochloride Injectable 25 milliGRAM(s) IntraMuscular at bedtime PRN  labetalol 100 milliGRAM(s) Oral two times a day  ondansetron Injectable 4 milliGRAM(s) IV Push every 8 hours PRN  senna 2 Tablet(s) Oral at bedtime  sodium chloride 0.45%. 1000 milliLiter(s) IV Continuous <Continuous>      Objective:  T(F): 96.8 (04-30-19 @ 05:31), Max: 97.6 (04-29-19 @ 21:43)  HR: 71 (04-30-19 @ 05:31) (71 - 81)  BP: 136/62 (04-30-19 @ 05:31) (126/58 - 139/60)  RR: 16 (04-30-19 @ 05:31) (16 - 16)  SpO2: --    PHYSICAL EXAM:  awake and alert   Constitutional:Well-developed, well nourished  Eyes:BORA, EOMI  Ear/Nose/Throat: no oral lesion, no sinus tenderness on percussion	  Neck:no JVD, no lymphadenopathy, supple  Respiratory: CTA leena  Cardiovascular: S1S2 +  Gastrointestinal:soft, (+) BS, no HSM  Extremities:no phlebitis      LABS:                        12.4   6.16  )-----------( 158      ( 29 Apr 2019 06:45 )             38.5     04-29    137  |  98  |  46<H>  ----------------------------<  124<H>  4.9   |  28  |  1.3    Ca    9.9      29 Apr 2019 12:09              MICROBIOLOGY:    Culture - Urine (collected 04-25-19 @ 18:35)  Source: .Urine Clean Catch (Midstream)  Final Report (04-28-19 @ 09:51):    >100,000 CFU/ml Escherichia coli    <10,000 CFU/ml Normal Urogenital nimesh present  Organism: Escherichia coli (04-28-19 @ 09:51)  Organism: Escherichia coli (04-28-19 @ 09:51)      -  Amikacin: S <=16      -  Ampicillin: R >16 These ampicillin results predict results for amoxicillin      -  Ampicillin/Sulbactam: R >16/8      -  Aztreonam: S <=4      -  Cefazolin: S <=8 For uncomplicated UTI with K. pneumoniae, E. coli, or P. mirablis: JOE <=16 is sensitive and JOE >=32 is resistant. This also predicts results for oral agents cefaclor, cefdinir, cefpodoxime, cefprozil, cefuroxime axetil, cephalexin and locarbef for uncomplicated UTI. Note that some isolates may be susceptible to these agents while testing resistant to cefazolin.      -  Cefepime: S <=4      -  Cefoxitin: S <=8      -  Ceftriaxone: S <=1 Enterobacter, Citrobacter, and Serratia may develop resistance during prolonged therapy      -  Ciprofloxacin: R >2      -  Ertapenem: S <=1      -  Gentamicin: R >8      -  Imipenem: S <=1      -  Levofloxacin: R >4      -  Meropenem: S <=1      -  Nitrofurantoin: S <=32 Should not be used to treat pyelonephritis      -  Piperacillin/Tazobactam: S <=16      -  Tigecycline: S <=2      -  Tobramycin: S <=4      -  Trimethoprim/Sulfamethoxazole: R >2/38      Method Type: JOE        Culture Results:   >100,000 CFU/ml Escherichia coli  <10,000 CFU/ml Normal Urogenital nimesh present (04-25 @ 18:35)        RADIOLOGY & ADDITIONAL STUDIES:    Neg CXR

## 2019-04-30 NOTE — DISCHARGE NOTE PROVIDER - CARE PROVIDER_API CALL
Faina Suazo)  Family Medicine  [address], [address]  [place] [address]  Phone: [phone]  Fax: [phone]  Follow Up Time:

## 2019-05-01 ENCOUNTER — TRANSCRIPTION ENCOUNTER (OUTPATIENT)
Age: 84
End: 2019-05-01

## 2019-05-01 VITALS
HEART RATE: 72 BPM | TEMPERATURE: 98 F | DIASTOLIC BLOOD PRESSURE: 57 MMHG | SYSTOLIC BLOOD PRESSURE: 105 MMHG | RESPIRATION RATE: 16 BRPM

## 2019-05-01 LAB
FLU A RESULT: NEGATIVE — SIGNIFICANT CHANGE UP
FLU A RESULT: NEGATIVE — SIGNIFICANT CHANGE UP
FLUAV AG NPH QL: NEGATIVE — SIGNIFICANT CHANGE UP
FLUBV AG NPH QL: NEGATIVE — SIGNIFICANT CHANGE UP
RSV RESULT: NEGATIVE — SIGNIFICANT CHANGE UP
RSV RNA RESP QL NAA+PROBE: NEGATIVE — SIGNIFICANT CHANGE UP

## 2019-05-01 PROCEDURE — 71045 X-RAY EXAM CHEST 1 VIEW: CPT | Mod: 26

## 2019-05-01 RX ORDER — DOCUSATE SODIUM 100 MG
1 CAPSULE ORAL
Qty: 60 | Refills: 0 | OUTPATIENT
Start: 2019-05-01 | End: 2019-05-30

## 2019-05-01 RX ORDER — CHOLECALCIFEROL (VITAMIN D3) 125 MCG
1 CAPSULE ORAL
Qty: 0 | Refills: 0 | COMMUNITY

## 2019-05-01 RX ORDER — LABETALOL HCL 100 MG
1 TABLET ORAL
Qty: 60 | Refills: 0 | OUTPATIENT
Start: 2019-05-01 | End: 2019-05-30

## 2019-05-01 RX ORDER — CINACALCET 30 MG/1
30 TABLET, FILM COATED ORAL
Qty: 0 | Refills: 0 | COMMUNITY

## 2019-05-01 RX ORDER — ASPIRIN/CALCIUM CARB/MAGNESIUM 324 MG
1 TABLET ORAL
Qty: 0 | Refills: 0 | COMMUNITY

## 2019-05-01 RX ORDER — AMLODIPINE BESYLATE 2.5 MG/1
1 TABLET ORAL
Qty: 30 | Refills: 0
Start: 2019-05-01 | End: 2019-05-30

## 2019-05-01 RX ADMIN — CINACALCET 30 MILLIGRAM(S): 30 TABLET, FILM COATED ORAL at 11:32

## 2019-05-01 RX ADMIN — Medication 250 MILLIGRAM(S): at 05:43

## 2019-05-01 RX ADMIN — Medication 100 MILLIGRAM(S): at 05:42

## 2019-05-01 RX ADMIN — AMLODIPINE BESYLATE 10 MILLIGRAM(S): 2.5 TABLET ORAL at 05:42

## 2019-05-01 RX ADMIN — Medication 81 MILLIGRAM(S): at 11:32

## 2019-05-01 RX ADMIN — HEPARIN SODIUM 5000 UNIT(S): 5000 INJECTION INTRAVENOUS; SUBCUTANEOUS at 05:42

## 2019-05-01 NOTE — PROGRESS NOTE ADULT - SUBJECTIVE AND OBJECTIVE BOX
SUBJECTIVE:  Patient was seen and examined at bedside. She stated that she felt well and was able to ambulate to restroom easily.  She does have some Cough which she has had for the past week, with phlegm but no other symptoms.  CXR and flu test done and negative    HISTORY OF PRESENTING ILLNESS:  HPI:  89yo female tells me she had dizziness in the morning but even though that symptom has resolved, she continues to have weakness. Notes that she generally is not eating as much as she used to. Denies headache or falls. No modifying factors readily identified (25 Apr 2019 21:48)      PAST MEDICAL & SURGICAL HISTORY:  PAST MEDICAL & SURGICAL HISTORY:  Hypothyroid  HTN (hypertension)  H/O carpal tunnel repair: B/L  H/O total knee replacement, bilateral  History of hip replacement, total, left: approx 10/2013      VITAL SIGNS:  Vital Signs Last 24 Hrs  T(C): 36.8 (01 May 2019 14:22), Max: 36.8 (01 May 2019 14:22)  T(F): 98.2 (01 May 2019 14:22), Max: 98.2 (01 May 2019 14:22)  HR: 72 (01 May 2019 14:22) (72 - 75)  BP: 105/57 (01 May 2019 14:22) (105/57 - 155/70)  BP(mean): --  RR: 16 (01 May 2019 14:22) (16 - 16)  SpO2: --    PHYSICAL EXAMINATION:  Not in acute distress  General: No pallor, or icterus, afebrile  HEENT:  BORA, EOMI, no JVD, no Bruit.  Heart: S1+S2 audible, no murmur  Lungs: bilateral  fair air entry, R sided coarse rhonchi noted  Abdomen: Soft, non-tender, non-distended , no  rigidity or guarding.  CNS: AAOx3, CN  grossly intact.  Extremities:  No edema          REVIEW OF SYSTEMS:  Patient denies any headache, any vision complaints, runny nose, fever, chills, sore throat. Denies chest pain, shortness of breath, palpitation. Denies nausea, vomiting, abdominal pain, diarrhoea, Denies urinary burning, urgency, frequency, dysuria. Denies weakness in any part of the body or numbness.   At least 10 systems were reviewed in ROS. All systems reviewed  are within normal limits except for the complaints as described in Subjective.

## 2019-05-01 NOTE — PROGRESS NOTE ADULT - ASSESSMENT
ASSESSMENT:  89yo female tells me she had dizziness and frequency of urine with associated generalized weakness.    DX:  ISMAEL on Chronic renal disease Stage  stage 3   Weakness & lethargy secondary to Urinary tract infection secondary to E coli    Associated Active Comorbid Conditions:  HTN (hypertension)   Hyperthyroid.   H/O total knee replacement, bilateral   History of hip replacement, total, left    PLAN:    UTI and grew E. coli, and was treated with cephalosporines. Patient developed a petechial rash on body and for this reason all ABX stopped upon discharge.   ISMAEL on Chronic renal disease Stage stage 3, resolved to baseline renal function with IVF hydration  CTH shows Multifocal intracranial stenoses due to atheromatous disease including severe stenosis of the left MCA proximal M2 segment and moderate stenoses of the left vertebral artery, basilar artery and left MCA M1 segment. 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 Outpatient follow up   Hypertension - not well controlled. Discontinue losartan and switch to labetaolol. Norvasc increased to 10 milligrams once daily   Hypertension - not well controlled . added norvasc      Discharge Disposition:   Medically stable for discharge today

## 2019-05-01 NOTE — PROGRESS NOTE ADULT - REASON FOR ADMISSION
dizziness, weakness

## 2019-05-01 NOTE — DISCHARGE NOTE NURSING/CASE MANAGEMENT/SOCIAL WORK - NSDCDPATPORTLINK_GEN_ALL_CORE
You can access the "[employer] Patient Portal, offered by [facility], by registering with the following website: [email or web address]

## 2019-05-07 DIAGNOSIS — L27.0 GENERALIZED SKIN ERUPTION DUE TO DRUGS AND MEDICAMENTS TAKEN INTERNALLY: ICD-10-CM

## 2019-05-07 DIAGNOSIS — Z96.653 PRESENCE OF ARTIFICIAL KNEE JOINT, BILATERAL: ICD-10-CM

## 2019-05-07 DIAGNOSIS — T36.95XA ADVERSE EFFECT OF UNSPECIFIED SYSTEMIC ANTIBIOTIC, INITIAL ENCOUNTER: ICD-10-CM

## 2019-05-07 DIAGNOSIS — N39.0 URINARY TRACT INFECTION, SITE NOT SPECIFIED: ICD-10-CM

## 2019-05-07 DIAGNOSIS — E86.0 DEHYDRATION: ICD-10-CM

## 2019-05-07 DIAGNOSIS — N18.3 CHRONIC KIDNEY DISEASE, STAGE 3 (MODERATE): ICD-10-CM

## 2019-05-07 DIAGNOSIS — Z96.642 PRESENCE OF LEFT ARTIFICIAL HIP JOINT: ICD-10-CM

## 2019-05-07 DIAGNOSIS — E03.9 HYPOTHYROIDISM, UNSPECIFIED: ICD-10-CM

## 2019-05-07 DIAGNOSIS — N17.9 ACUTE KIDNEY FAILURE, UNSPECIFIED: ICD-10-CM

## 2019-05-07 DIAGNOSIS — I12.9 HYPERTENSIVE CHRONIC KIDNEY DISEASE WITH STAGE 1 THROUGH STAGE 4 CHRONIC KIDNEY DISEASE, OR UNSPECIFIED CHRONIC KIDNEY DISEASE: ICD-10-CM

## 2019-05-07 DIAGNOSIS — B96.20 UNSPECIFIED ESCHERICHIA COLI [E. COLI] AS THE CAUSE OF DISEASES CLASSIFIED ELSEWHERE: ICD-10-CM

## 2019-05-07 DIAGNOSIS — J98.11 ATELECTASIS: ICD-10-CM

## 2020-05-15 NOTE — PHYSICAL THERAPY INITIAL EVALUATION ADULT - LEVEL OF INDEPENDENCE: GAIT, REHAB EVAL
Please call over to Dr. Linares's office and let them know about asap referral and see if they might be able to start with virtual visit?  Thanks!  Sisi   jania turns/obstacles with occ minAx1 on level/moderate assist (50% patients effort)

## 2020-07-02 NOTE — ED ADULT NURSE NOTE - PSH
H/O carpal tunnel repair  B/L  H/O total knee replacement, bilateral    History of hip replacement, total, left  approx 10/2013
no depression/no memory loss/no anxiety/no insomnia/no mood swings

## 2020-07-17 NOTE — DISCHARGE NOTE NURSING/CASE MANAGEMENT/SOCIAL WORK - NSTOBACCONEVERSMOKERY/N_GEN_A
"Subjective:    Patient ID:  Cristhian Patel is a 58 y.o. y.o. male who presents for f/u visit for Pain of the Left Knee and Pain of the Right Knee      Patient of Dr. Gould returns for follow-up for bilateral knee pain secondary to osteoarthritis.  His history is well-documented in Dr. Gould's previous office notes.  He requests repeat intra-articular cortisone injection bilateral knee today.         Objective:     BP (!) 144/89   Pulse 78   Resp 18   Ht 5' 8" (1.727 m)   Wt (!) 143 kg (315 lb 4.1 oz)   SpO2 99%   BMI 47.93 kg/m²     Ortho Exam     Bilateral knee: no effusion; tender MJL; ROM: 0-110 flexion; no gross ligamentous laxity      Assessment & Plan:     1. Primary osteoarthritis of both knees        1.  Bilateral knee intra-articular cortisone injection administered as documented; post-injection instructions reviewed  2.  Follow-up prn, consider future trial of viscosupplement injections bilateral knee  "
No

## 2022-06-27 NOTE — PHYSICAL THERAPY INITIAL EVALUATION ADULT - PHYSICAL ASSIST/NONPHYSICAL ASSIST: GAIT, REHAB EVAL
verbal cues/1 person assist/posture, base of support, lakesha Cartilage Graft Text: The defect edges were debeveled with a #[id number] scalpel blade.  Given the location of the defect, shape of the defect, the fact the defect involved a full thickness cartilage defect a cartilage graft was deemed most appropriate.  An appropriate donor site was identified, cleansed, and anesthetized. The cartilage graft was then harvested and transferred to the recipient site, oriented appropriately and then sutured into place.  The secondary defect was then repaired using a primary closure.

## 2023-12-28 ENCOUNTER — INPATIENT (INPATIENT)
Facility: HOSPITAL | Age: 88
LOS: 11 days | Discharge: HOME HEALTH | DRG: 871 | End: 2024-01-09
Attending: INTERNAL MEDICINE | Admitting: INTERNAL MEDICINE
Payer: MEDICARE

## 2023-12-28 VITALS
TEMPERATURE: 101 F | DIASTOLIC BLOOD PRESSURE: 57 MMHG | SYSTOLIC BLOOD PRESSURE: 133 MMHG | RESPIRATION RATE: 28 BRPM | WEIGHT: 164.91 LBS | HEART RATE: 117 BPM | OXYGEN SATURATION: 89 %

## 2023-12-28 DIAGNOSIS — Z98.890 OTHER SPECIFIED POSTPROCEDURAL STATES: Chronic | ICD-10-CM

## 2023-12-28 DIAGNOSIS — Z96.653 PRESENCE OF ARTIFICIAL KNEE JOINT, BILATERAL: Chronic | ICD-10-CM

## 2023-12-28 DIAGNOSIS — Z96.642 PRESENCE OF LEFT ARTIFICIAL HIP JOINT: Chronic | ICD-10-CM

## 2023-12-28 DIAGNOSIS — J09.X1 INFLUENZA DUE TO IDENTIFIED NOVEL INFLUENZA A VIRUS WITH PNEUMONIA: ICD-10-CM

## 2023-12-28 LAB
ALBUMIN SERPL ELPH-MCNC: 3.7 G/DL — SIGNIFICANT CHANGE UP (ref 3.5–5.2)
ALBUMIN SERPL ELPH-MCNC: 3.7 G/DL — SIGNIFICANT CHANGE UP (ref 3.5–5.2)
ALP SERPL-CCNC: 76 U/L — SIGNIFICANT CHANGE UP (ref 30–115)
ALP SERPL-CCNC: 76 U/L — SIGNIFICANT CHANGE UP (ref 30–115)
ALT FLD-CCNC: 10 U/L — SIGNIFICANT CHANGE UP (ref 0–41)
ALT FLD-CCNC: 10 U/L — SIGNIFICANT CHANGE UP (ref 0–41)
ANION GAP SERPL CALC-SCNC: 8 MMOL/L — SIGNIFICANT CHANGE UP (ref 7–14)
ANION GAP SERPL CALC-SCNC: 8 MMOL/L — SIGNIFICANT CHANGE UP (ref 7–14)
APTT BLD: 27.8 SEC — SIGNIFICANT CHANGE UP (ref 27–39.2)
APTT BLD: 27.8 SEC — SIGNIFICANT CHANGE UP (ref 27–39.2)
AST SERPL-CCNC: 49 U/L — HIGH (ref 0–41)
AST SERPL-CCNC: 49 U/L — HIGH (ref 0–41)
BASE EXCESS BLDA CALC-SCNC: -4.5 MMOL/L — LOW (ref -2–3)
BASE EXCESS BLDA CALC-SCNC: -4.5 MMOL/L — LOW (ref -2–3)
BASOPHILS # BLD AUTO: 0.03 K/UL — SIGNIFICANT CHANGE UP (ref 0–0.2)
BASOPHILS # BLD AUTO: 0.03 K/UL — SIGNIFICANT CHANGE UP (ref 0–0.2)
BASOPHILS NFR BLD AUTO: 0.5 % — SIGNIFICANT CHANGE UP (ref 0–1)
BASOPHILS NFR BLD AUTO: 0.5 % — SIGNIFICANT CHANGE UP (ref 0–1)
BILIRUB SERPL-MCNC: 0.3 MG/DL — SIGNIFICANT CHANGE UP (ref 0.2–1.2)
BILIRUB SERPL-MCNC: 0.3 MG/DL — SIGNIFICANT CHANGE UP (ref 0.2–1.2)
BUN SERPL-MCNC: 34 MG/DL — HIGH (ref 10–20)
BUN SERPL-MCNC: 34 MG/DL — HIGH (ref 10–20)
CALCIUM SERPL-MCNC: 8.7 MG/DL — SIGNIFICANT CHANGE UP (ref 8.4–10.5)
CALCIUM SERPL-MCNC: 8.7 MG/DL — SIGNIFICANT CHANGE UP (ref 8.4–10.5)
CHLORIDE SERPL-SCNC: 109 MMOL/L — SIGNIFICANT CHANGE UP (ref 98–110)
CHLORIDE SERPL-SCNC: 109 MMOL/L — SIGNIFICANT CHANGE UP (ref 98–110)
CO2 SERPL-SCNC: 23 MMOL/L — SIGNIFICANT CHANGE UP (ref 17–32)
CO2 SERPL-SCNC: 23 MMOL/L — SIGNIFICANT CHANGE UP (ref 17–32)
CREAT SERPL-MCNC: 1.1 MG/DL — SIGNIFICANT CHANGE UP (ref 0.7–1.5)
CREAT SERPL-MCNC: 1.1 MG/DL — SIGNIFICANT CHANGE UP (ref 0.7–1.5)
D DIMER BLD IA.RAPID-MCNC: 1177 NG/ML DDU — HIGH
D DIMER BLD IA.RAPID-MCNC: 1177 NG/ML DDU — HIGH
EGFR: 45 ML/MIN/1.73M2 — LOW
EGFR: 45 ML/MIN/1.73M2 — LOW
EOSINOPHIL # BLD AUTO: 0 K/UL — SIGNIFICANT CHANGE UP (ref 0–0.7)
EOSINOPHIL # BLD AUTO: 0 K/UL — SIGNIFICANT CHANGE UP (ref 0–0.7)
EOSINOPHIL NFR BLD AUTO: 0 % — SIGNIFICANT CHANGE UP (ref 0–8)
EOSINOPHIL NFR BLD AUTO: 0 % — SIGNIFICANT CHANGE UP (ref 0–8)
FLUAV AG NPH QL: DETECTED
FLUAV AG NPH QL: DETECTED
FLUBV AG NPH QL: SIGNIFICANT CHANGE UP
FLUBV AG NPH QL: SIGNIFICANT CHANGE UP
GAS PNL BLDV: SIGNIFICANT CHANGE UP
GAS PNL BLDV: SIGNIFICANT CHANGE UP
GLUCOSE SERPL-MCNC: 142 MG/DL — HIGH (ref 70–99)
GLUCOSE SERPL-MCNC: 142 MG/DL — HIGH (ref 70–99)
HCO3 BLDA-SCNC: 24 MMOL/L — SIGNIFICANT CHANGE UP (ref 21–28)
HCO3 BLDA-SCNC: 24 MMOL/L — SIGNIFICANT CHANGE UP (ref 21–28)
HCT VFR BLD CALC: 32 % — LOW (ref 37–47)
HCT VFR BLD CALC: 32 % — LOW (ref 37–47)
HGB BLD-MCNC: 10.7 G/DL — LOW (ref 12–16)
HGB BLD-MCNC: 10.7 G/DL — LOW (ref 12–16)
IMM GRANULOCYTES NFR BLD AUTO: 0.5 % — HIGH (ref 0.1–0.3)
IMM GRANULOCYTES NFR BLD AUTO: 0.5 % — HIGH (ref 0.1–0.3)
INR BLD: 1.1 RATIO — SIGNIFICANT CHANGE UP (ref 0.65–1.3)
INR BLD: 1.1 RATIO — SIGNIFICANT CHANGE UP (ref 0.65–1.3)
LACTATE BLDV-MCNC: 1.5 MMOL/L — SIGNIFICANT CHANGE UP (ref 0.5–2)
LACTATE BLDV-MCNC: 1.5 MMOL/L — SIGNIFICANT CHANGE UP (ref 0.5–2)
LACTATE SERPL-SCNC: 1.6 MMOL/L — SIGNIFICANT CHANGE UP (ref 0.7–2)
LACTATE SERPL-SCNC: 1.6 MMOL/L — SIGNIFICANT CHANGE UP (ref 0.7–2)
LYMPHOCYTES # BLD AUTO: 0.59 K/UL — LOW (ref 1.2–3.4)
LYMPHOCYTES # BLD AUTO: 0.59 K/UL — LOW (ref 1.2–3.4)
LYMPHOCYTES # BLD AUTO: 10.6 % — LOW (ref 20.5–51.1)
LYMPHOCYTES # BLD AUTO: 10.6 % — LOW (ref 20.5–51.1)
MCHC RBC-ENTMCNC: 31.1 PG — HIGH (ref 27–31)
MCHC RBC-ENTMCNC: 31.1 PG — HIGH (ref 27–31)
MCHC RBC-ENTMCNC: 33.4 G/DL — SIGNIFICANT CHANGE UP (ref 32–37)
MCHC RBC-ENTMCNC: 33.4 G/DL — SIGNIFICANT CHANGE UP (ref 32–37)
MCV RBC AUTO: 93 FL — SIGNIFICANT CHANGE UP (ref 81–99)
MCV RBC AUTO: 93 FL — SIGNIFICANT CHANGE UP (ref 81–99)
MONOCYTES # BLD AUTO: 0.59 K/UL — SIGNIFICANT CHANGE UP (ref 0.1–0.6)
MONOCYTES # BLD AUTO: 0.59 K/UL — SIGNIFICANT CHANGE UP (ref 0.1–0.6)
MONOCYTES NFR BLD AUTO: 10.6 % — HIGH (ref 1.7–9.3)
MONOCYTES NFR BLD AUTO: 10.6 % — HIGH (ref 1.7–9.3)
NEUTROPHILS # BLD AUTO: 4.34 K/UL — SIGNIFICANT CHANGE UP (ref 1.4–6.5)
NEUTROPHILS # BLD AUTO: 4.34 K/UL — SIGNIFICANT CHANGE UP (ref 1.4–6.5)
NEUTROPHILS NFR BLD AUTO: 77.8 % — HIGH (ref 42.2–75.2)
NEUTROPHILS NFR BLD AUTO: 77.8 % — HIGH (ref 42.2–75.2)
NRBC # BLD: 0 /100 WBCS — SIGNIFICANT CHANGE UP (ref 0–0)
NRBC # BLD: 0 /100 WBCS — SIGNIFICANT CHANGE UP (ref 0–0)
NT-PROBNP SERPL-SCNC: 5063 PG/ML — HIGH (ref 0–300)
NT-PROBNP SERPL-SCNC: 5063 PG/ML — HIGH (ref 0–300)
PCO2 BLDA: 59 MMHG — HIGH (ref 32–45)
PCO2 BLDA: 59 MMHG — HIGH (ref 32–45)
PH BLDA: 7.22 — LOW (ref 7.35–7.45)
PH BLDA: 7.22 — LOW (ref 7.35–7.45)
PLATELET # BLD AUTO: 134 K/UL — SIGNIFICANT CHANGE UP (ref 130–400)
PLATELET # BLD AUTO: 134 K/UL — SIGNIFICANT CHANGE UP (ref 130–400)
PMV BLD: 11.8 FL — HIGH (ref 7.4–10.4)
PMV BLD: 11.8 FL — HIGH (ref 7.4–10.4)
PO2 BLDA: 114 MMHG — HIGH (ref 83–108)
PO2 BLDA: 114 MMHG — HIGH (ref 83–108)
POTASSIUM SERPL-MCNC: 4.6 MMOL/L — SIGNIFICANT CHANGE UP (ref 3.5–5)
POTASSIUM SERPL-MCNC: 4.6 MMOL/L — SIGNIFICANT CHANGE UP (ref 3.5–5)
POTASSIUM SERPL-SCNC: 4.6 MMOL/L — SIGNIFICANT CHANGE UP (ref 3.5–5)
POTASSIUM SERPL-SCNC: 4.6 MMOL/L — SIGNIFICANT CHANGE UP (ref 3.5–5)
PROT SERPL-MCNC: 6.3 G/DL — SIGNIFICANT CHANGE UP (ref 6–8)
PROT SERPL-MCNC: 6.3 G/DL — SIGNIFICANT CHANGE UP (ref 6–8)
PROTHROM AB SERPL-ACNC: 12.5 SEC — SIGNIFICANT CHANGE UP (ref 9.95–12.87)
PROTHROM AB SERPL-ACNC: 12.5 SEC — SIGNIFICANT CHANGE UP (ref 9.95–12.87)
RBC # BLD: 3.44 M/UL — LOW (ref 4.2–5.4)
RBC # BLD: 3.44 M/UL — LOW (ref 4.2–5.4)
RBC # FLD: 12.3 % — SIGNIFICANT CHANGE UP (ref 11.5–14.5)
RBC # FLD: 12.3 % — SIGNIFICANT CHANGE UP (ref 11.5–14.5)
RSV RNA NPH QL NAA+NON-PROBE: SIGNIFICANT CHANGE UP
RSV RNA NPH QL NAA+NON-PROBE: SIGNIFICANT CHANGE UP
SAO2 % BLDA: 99.7 % — HIGH (ref 94–98)
SAO2 % BLDA: 99.7 % — HIGH (ref 94–98)
SARS-COV-2 RNA SPEC QL NAA+PROBE: SIGNIFICANT CHANGE UP
SARS-COV-2 RNA SPEC QL NAA+PROBE: SIGNIFICANT CHANGE UP
SODIUM SERPL-SCNC: 140 MMOL/L — SIGNIFICANT CHANGE UP (ref 135–146)
SODIUM SERPL-SCNC: 140 MMOL/L — SIGNIFICANT CHANGE UP (ref 135–146)
TROPONIN T, HIGH SENSITIVITY RESULT: 1263 NG/L — CRITICAL HIGH (ref 6–13)
TROPONIN T, HIGH SENSITIVITY RESULT: 1263 NG/L — CRITICAL HIGH (ref 6–13)
TROPONIN T, HIGH SENSITIVITY RESULT: 620 NG/L — CRITICAL HIGH (ref 6–13)
TROPONIN T, HIGH SENSITIVITY RESULT: 620 NG/L — CRITICAL HIGH (ref 6–13)
WBC # BLD: 5.58 K/UL — SIGNIFICANT CHANGE UP (ref 4.8–10.8)
WBC # BLD: 5.58 K/UL — SIGNIFICANT CHANGE UP (ref 4.8–10.8)
WBC # FLD AUTO: 5.58 K/UL — SIGNIFICANT CHANGE UP (ref 4.8–10.8)
WBC # FLD AUTO: 5.58 K/UL — SIGNIFICANT CHANGE UP (ref 4.8–10.8)

## 2023-12-28 PROCEDURE — 82330 ASSAY OF CALCIUM: CPT

## 2023-12-28 PROCEDURE — 85730 THROMBOPLASTIN TIME PARTIAL: CPT

## 2023-12-28 PROCEDURE — 94760 N-INVAS EAR/PLS OXIMETRY 1: CPT

## 2023-12-28 PROCEDURE — 86900 BLOOD TYPING SEROLOGIC ABO: CPT

## 2023-12-28 PROCEDURE — 80048 BASIC METABOLIC PNL TOTAL CA: CPT

## 2023-12-28 PROCEDURE — 97166 OT EVAL MOD COMPLEX 45 MIN: CPT | Mod: GO

## 2023-12-28 PROCEDURE — 84132 ASSAY OF SERUM POTASSIUM: CPT

## 2023-12-28 PROCEDURE — 85018 HEMOGLOBIN: CPT

## 2023-12-28 PROCEDURE — 80053 COMPREHEN METABOLIC PANEL: CPT

## 2023-12-28 PROCEDURE — 97530 THERAPEUTIC ACTIVITIES: CPT | Mod: GP

## 2023-12-28 PROCEDURE — 82728 ASSAY OF FERRITIN: CPT

## 2023-12-28 PROCEDURE — 85045 AUTOMATED RETICULOCYTE COUNT: CPT

## 2023-12-28 PROCEDURE — 94640 AIRWAY INHALATION TREATMENT: CPT

## 2023-12-28 PROCEDURE — 97116 GAIT TRAINING THERAPY: CPT | Mod: GP

## 2023-12-28 PROCEDURE — 82803 BLOOD GASES ANY COMBINATION: CPT

## 2023-12-28 PROCEDURE — 83550 IRON BINDING TEST: CPT

## 2023-12-28 PROCEDURE — 84484 ASSAY OF TROPONIN QUANT: CPT

## 2023-12-28 PROCEDURE — 86901 BLOOD TYPING SEROLOGIC RH(D): CPT

## 2023-12-28 PROCEDURE — 93005 ELECTROCARDIOGRAM TRACING: CPT

## 2023-12-28 PROCEDURE — 84443 ASSAY THYROID STIM HORMONE: CPT

## 2023-12-28 PROCEDURE — 85610 PROTHROMBIN TIME: CPT

## 2023-12-28 PROCEDURE — 85014 HEMATOCRIT: CPT

## 2023-12-28 PROCEDURE — 97535 SELF CARE MNGMENT TRAINING: CPT | Mod: GO

## 2023-12-28 PROCEDURE — 93970 EXTREMITY STUDY: CPT

## 2023-12-28 PROCEDURE — 86850 RBC ANTIBODY SCREEN: CPT

## 2023-12-28 PROCEDURE — 93010 ELECTROCARDIOGRAM REPORT: CPT

## 2023-12-28 PROCEDURE — 83735 ASSAY OF MAGNESIUM: CPT

## 2023-12-28 PROCEDURE — 97161 PT EVAL LOW COMPLEX 20 MIN: CPT | Mod: GP

## 2023-12-28 PROCEDURE — 71045 X-RAY EXAM CHEST 1 VIEW: CPT | Mod: 26

## 2023-12-28 PROCEDURE — 82962 GLUCOSE BLOOD TEST: CPT

## 2023-12-28 PROCEDURE — 85025 COMPLETE CBC W/AUTO DIFF WBC: CPT

## 2023-12-28 PROCEDURE — 99291 CRITICAL CARE FIRST HOUR: CPT

## 2023-12-28 PROCEDURE — 84466 ASSAY OF TRANSFERRIN: CPT

## 2023-12-28 PROCEDURE — 85027 COMPLETE CBC AUTOMATED: CPT

## 2023-12-28 PROCEDURE — 93306 TTE W/DOPPLER COMPLETE: CPT

## 2023-12-28 PROCEDURE — 84295 ASSAY OF SERUM SODIUM: CPT

## 2023-12-28 PROCEDURE — 94660 CPAP INITIATION&MGMT: CPT

## 2023-12-28 PROCEDURE — 84145 PROCALCITONIN (PCT): CPT

## 2023-12-28 PROCEDURE — 97110 THERAPEUTIC EXERCISES: CPT | Mod: GP

## 2023-12-28 PROCEDURE — 99223 1ST HOSP IP/OBS HIGH 75: CPT

## 2023-12-28 PROCEDURE — 85379 FIBRIN DEGRADATION QUANT: CPT

## 2023-12-28 PROCEDURE — 71045 X-RAY EXAM CHEST 1 VIEW: CPT

## 2023-12-28 PROCEDURE — 83605 ASSAY OF LACTIC ACID: CPT

## 2023-12-28 PROCEDURE — 93010 ELECTROCARDIOGRAM REPORT: CPT | Mod: 77

## 2023-12-28 PROCEDURE — 83540 ASSAY OF IRON: CPT

## 2023-12-28 PROCEDURE — 36415 COLL VENOUS BLD VENIPUNCTURE: CPT

## 2023-12-28 RX ORDER — FUROSEMIDE 40 MG
20 TABLET ORAL ONCE
Refills: 0 | Status: COMPLETED | OUTPATIENT
Start: 2023-12-28 | End: 2023-12-28

## 2023-12-28 RX ORDER — SODIUM CHLORIDE 9 MG/ML
1000 INJECTION, SOLUTION INTRAVENOUS ONCE
Refills: 0 | Status: COMPLETED | OUTPATIENT
Start: 2023-12-28 | End: 2023-12-28

## 2023-12-28 RX ORDER — CINACALCET 30 MG/1
30 TABLET, FILM COATED ORAL DAILY
Refills: 0 | Status: DISCONTINUED | OUTPATIENT
Start: 2023-12-28 | End: 2024-01-09

## 2023-12-28 RX ORDER — CEFTRIAXONE 500 MG/1
1000 INJECTION, POWDER, FOR SOLUTION INTRAMUSCULAR; INTRAVENOUS ONCE
Refills: 0 | Status: COMPLETED | OUTPATIENT
Start: 2023-12-28 | End: 2023-12-28

## 2023-12-28 RX ORDER — ACETAMINOPHEN 500 MG
975 TABLET ORAL ONCE
Refills: 0 | Status: COMPLETED | OUTPATIENT
Start: 2023-12-28 | End: 2023-12-28

## 2023-12-28 RX ORDER — AMPICILLIN SODIUM AND SULBACTAM SODIUM 250; 125 MG/ML; MG/ML
3 INJECTION, POWDER, FOR SUSPENSION INTRAMUSCULAR; INTRAVENOUS EVERY 6 HOURS
Refills: 0 | Status: DISCONTINUED | OUTPATIENT
Start: 2023-12-29 | End: 2023-12-29

## 2023-12-28 RX ORDER — AMPICILLIN SODIUM AND SULBACTAM SODIUM 250; 125 MG/ML; MG/ML
3 INJECTION, POWDER, FOR SUSPENSION INTRAMUSCULAR; INTRAVENOUS ONCE
Refills: 0 | Status: COMPLETED | OUTPATIENT
Start: 2023-12-28 | End: 2023-12-29

## 2023-12-28 RX ORDER — MORPHINE SULFATE 50 MG/1
2 CAPSULE, EXTENDED RELEASE ORAL ONCE
Refills: 0 | Status: DISCONTINUED | OUTPATIENT
Start: 2023-12-28 | End: 2023-12-28

## 2023-12-28 RX ORDER — METOPROLOL TARTRATE 50 MG
5 TABLET ORAL ONCE
Refills: 0 | Status: COMPLETED | OUTPATIENT
Start: 2023-12-28 | End: 2023-12-28

## 2023-12-28 RX ORDER — LOSARTAN POTASSIUM 100 MG/1
25 TABLET, FILM COATED ORAL DAILY
Refills: 0 | Status: DISCONTINUED | OUTPATIENT
Start: 2023-12-28 | End: 2023-12-28

## 2023-12-28 RX ORDER — AZITHROMYCIN 500 MG/1
500 TABLET, FILM COATED ORAL ONCE
Refills: 0 | Status: COMPLETED | OUTPATIENT
Start: 2023-12-28 | End: 2023-12-28

## 2023-12-28 RX ORDER — AMPICILLIN SODIUM AND SULBACTAM SODIUM 250; 125 MG/ML; MG/ML
INJECTION, POWDER, FOR SUSPENSION INTRAMUSCULAR; INTRAVENOUS
Refills: 0 | Status: DISCONTINUED | OUTPATIENT
Start: 2023-12-29 | End: 2023-12-29

## 2023-12-28 RX ORDER — HEPARIN SODIUM 5000 [USP'U]/ML
5000 INJECTION INTRAVENOUS; SUBCUTANEOUS EVERY 12 HOURS
Refills: 0 | Status: DISCONTINUED | OUTPATIENT
Start: 2023-12-28 | End: 2023-12-29

## 2023-12-28 RX ORDER — IPRATROPIUM/ALBUTEROL SULFATE 18-103MCG
3 AEROSOL WITH ADAPTER (GRAM) INHALATION EVERY 4 HOURS
Refills: 0 | Status: DISCONTINUED | OUTPATIENT
Start: 2023-12-28 | End: 2024-01-09

## 2023-12-28 RX ORDER — AMLODIPINE BESYLATE 2.5 MG/1
5 TABLET ORAL DAILY
Refills: 0 | Status: DISCONTINUED | OUTPATIENT
Start: 2023-12-28 | End: 2023-12-28

## 2023-12-28 RX ORDER — FUROSEMIDE 40 MG
20 TABLET ORAL DAILY
Refills: 0 | Status: DISCONTINUED | OUTPATIENT
Start: 2023-12-28 | End: 2023-12-30

## 2023-12-28 RX ADMIN — Medication 975 MILLIGRAM(S): at 06:40

## 2023-12-28 RX ADMIN — CEFTRIAXONE 100 MILLIGRAM(S): 500 INJECTION, POWDER, FOR SOLUTION INTRAMUSCULAR; INTRAVENOUS at 06:41

## 2023-12-28 RX ADMIN — Medication 3 MILLILITER(S): at 16:29

## 2023-12-28 RX ADMIN — AZITHROMYCIN 255 MILLIGRAM(S): 500 TABLET, FILM COATED ORAL at 06:40

## 2023-12-28 RX ADMIN — Medication 20 MILLIGRAM(S): at 13:26

## 2023-12-28 RX ADMIN — Medication 3 MILLILITER(S): at 12:28

## 2023-12-28 RX ADMIN — Medication 3 MILLILITER(S): at 19:57

## 2023-12-28 RX ADMIN — Medication 5 MILLIGRAM(S): at 20:44

## 2023-12-28 RX ADMIN — HEPARIN SODIUM 5000 UNIT(S): 5000 INJECTION INTRAVENOUS; SUBCUTANEOUS at 17:29

## 2023-12-28 RX ADMIN — Medication 75 MILLIGRAM(S): at 12:29

## 2023-12-28 RX ADMIN — SODIUM CHLORIDE 1000 MILLILITER(S): 9 INJECTION, SOLUTION INTRAVENOUS at 06:40

## 2023-12-28 RX ADMIN — MORPHINE SULFATE 2 MILLIGRAM(S): 50 CAPSULE, EXTENDED RELEASE ORAL at 22:00

## 2023-12-28 NOTE — ED PROVIDER NOTE - CLINICAL SUMMARY MEDICAL DECISION MAKING FREE TEXT BOX
Hipolito-year-old female diagnosed with influenza a on today's visit to the ED for evaluation of shortness of breath.  Patient was put on nasal cannula and saturating well.  Labs were ordered and reviewed.  Troponin found to be elevated, patient was admitted to [facility] telemetry for further monitoring and testing. Hipolito-year-old female diagnosed with influenza a on today's visit to the ED for evaluation of shortness of breath.  Patient was put on nasal cannula and saturating well.  Labs were ordered and reviewed.  Troponin found to be elevated, patient was admitted to [facility] telemetry for further monitoring and testing.

## 2023-12-28 NOTE — ED PROVIDER NOTE - ATTENDING CONTRIBUTION TO CARE
89-year-old female PMH HTN, ckd, op, hypothyroidism, hyperparathyroidism presenting with fever, URI symptoms, cough, and shortness of breath x 2 days.  Worsened overnight, lives with her sons who states that she was calling them throughout the night complaining that she felt like she could not breathe.  Febrile and hypoxic to 89% on room air on arrival, improved on 2 L nasal cannula.  Denies any headache, sore throat, CP, nausea vomiting diarrhea, abdominal pain, flank pain, urinary symptoms, rash.  Sons endorse recent sick contacts, family members with URI symptoms who she was with over the holidays.    PE:  thin elderly F nad  skin warm, dry  ncat  neck supple  tachy 110s reg rhythm nl s1s2 no mrg  tachypnic, otherwise nlWOB, good air entry bl, ctab no wrr  abd soft ntnd no palpable masses no rgr  back non-tender no cvat  ext no cce dpi  neuro aaox3 grossly nf exam

## 2023-12-28 NOTE — GOALS OF CARE CONVERSATION - ADVANCED CARE PLANNING - CONVERSATION DETAILS
Initially spoke with patient's daughter in law Rosetta on the phone after patient had an aspiration event. During our phone conversation, she had made the patient DNR/DNI, until her  and his family could come to the bedside.     20 minutes later I spoke with the patient's two sons, granddaughter, and niece. I explained that the patient's respiratory status was declining, she was requiring the bipap continuously. I also told them that she vomited while on the bipap, attempted to transition her to HFNC which was safer in this situation but she did not tolerate it; desatting, tachypnea, accessory muscle use, tachycardia, requiring her to remain on bipap, which was risky. They understood, and wanted her to remain on the bipap. I discussed DNR with them, and they do not want compression and understand that the likelihood of her surviving a cardiac arrest at her age is low. However when it came to discussions regarding intubation, the family is still very unsure. They were unable to provide me with a clear answer and no decisions were made.    At this time patient is DNR/Intubate based on inability to make a decision. Patients son is at bedside, and if anything were to happen please call to discuss prior to intubation.

## 2023-12-28 NOTE — ED PROVIDER NOTE - PROGRESS NOTE DETAILS
ZAYNAB Pierson.- Sign out received from Dr. Fernandez. Pt. Axox3, son's at bedside. Pt. O2 sat 100% in 4L NC. Pt. comfortable and agrees w. admission once blood tests are back. EP: Patient endorsed to me to admit.

## 2023-12-28 NOTE — H&P ADULT - ATTENDING COMMENTS
89-year-old female PMH HTN, ckd, op, hypothyroidism, hyperparathyroidism presenting with fever, URI symptoms, cough, and shortness of breath x 2 days.  Worsened overnight, lives with her sons who states that she was calling them throughout the night complaining that she felt like she could not breathe.  Febrile and hypoxic to 89% on room air on arrival, improved on 2 L nasal cannula.  Denies any headache, sore throat, CP, nausea vomiting diarrhea, abdominal pain, flank pain, urinary symptoms, rash.  Sons endorse recent sick contacts, family members with URI symptoms who she was with over the holidays.    # Acute hypoxemic respiratory failure 2/2 Acute CHF exacerbation precipitated by influenza A    # Troponinemia from demand ischemia   # Sepsis present on admission    - Chest X-ray: Bilateral interstitial opacities and likely biapical thickening, new since distant prior   - BNP elevated, JVD on exam   - c/w 5 day of tamiflu   - IV lasix, monitor UOP   - keep on tele for now     # Normocytic anemia   - monitor     # HTN   - Hold amlodipine and Losartan while on diuresis     # CKD stage 3a   - Baseline creatinine: 1.2   - Avoid nephrotoxic agents       - DVT ppx: Heparin SQ      GOC discussion ongoing with family

## 2023-12-28 NOTE — ED PROVIDER NOTE - PHYSICAL EXAMINATION
VITAL SIGNS: I have reviewed nursing notes and confirm.  CONSTITUTIONAL: Well-developed; well-nourished; in no acute distress. 2L NC   SKIN: Skin exam is warm and dry, no acute rash.  HEAD: Normocephalic; atraumatic.  EYES: PERRL, EOM intact; conjunctiva and sclera clear.  ENT: No nasal discharge; airway clear. TMs clear.  NECK: Supple; non tender.  CARD: S1, S2 normal; no murmurs, gallops, or rubs. Regular rate and rhythm.  RESP: Normal respiratory effort, no tachypnea or distress. Lungs CTAB, no wheezes, rales or rhonchi.  ABD: soft, NT/ND.  EXT: Normal ROM. No clubbing, cyanosis or edema.  NEURO: Alert, oriented.   PSYCH: Cooperative, appropriate.

## 2023-12-28 NOTE — H&P ADULT - HISTORY OF PRESENT ILLNESS
89-year-old female PMH HTN, ckd, op, hypothyroidism, hyperparathyroidism presenting with fever, URI symptoms, cough, and shortness of breath x 2 days.  Worsened overnight, lives with her sons who states that she was calling them throughout the night complaining that she felt like she could not breathe.  Febrile and hypoxic to 89% on room air on arrival, improved on 2 L nasal cannula.  Denies any headache, sore throat, CP, nausea vomiting diarrhea, abdominal pain, flank pain, urinary symptoms, rash.  Sons endorse recent sick contacts, family members with URI symptoms who she was with over the holidays.    In ED:   - Vital signs: BP:133/ 57  Heart Rate: 117 /min Respiration Rate: 28 /min. Temp (F): 100.5 Degrees F. SpO2 89 % on RA   - Labs were significant for: Hb: 10.7, Trop HS: 620, BNP: 5063, AST:49  - Chest X-ray: Bilateral interstitial opacities and likely biapical thickening, new since distant prior.

## 2023-12-28 NOTE — H&P ADULT - NSHPLABSRESULTS_GEN_ALL_CORE
LABS:                          10.7   5.58  )-----------( 134      ( 28 Dec 2023 07:39 )             32.0     12-28    140  |  109  |  34<H>  ----------------------------<  142<H>  4.6   |  23  |  1.1    Ca    8.7      28 Dec 2023 07:39    TPro  6.3  /  Alb  3.7  /  TBili  0.3  /  DBili  x   /  AST  49<H>  /  ALT  10  /  AlkPhos  76  12-28    LIVER FUNCTIONS - ( 28 Dec 2023 07:39 )  Alb: 3.7 g/dL / Pro: 6.3 g/dL / ALK PHOS: 76 U/L / ALT: 10 U/L / AST: 49 U/L / GGT: x           PT/INR - ( 28 Dec 2023 07:39 )   PT: 12.50 sec;   INR: 1.10 ratio         PTT - ( 28 Dec 2023 07:39 )  PTT:27.8 sec  Urinalysis Basic - ( 28 Dec 2023 07:39 )    Color: x / Appearance: x / SG: x / pH: x  Gluc: 142 mg/dL / Ketone: x  / Bili: x / Urobili: x   Blood: x / Protein: x / Nitrite: x   Leuk Esterase: x / RBC: x / WBC x   Sq Epi: x / Non Sq Epi: x / Bacteria: x

## 2023-12-28 NOTE — H&P ADULT - NSHPPHYSICALEXAM_GEN_ALL_CORE
CONSTITUTIONAL: Well-developed; well-nourished; in no acute distress. 2L NC   SKIN: Skin exam is warm and dry, no acute rash.  HEAD: Normocephalic; atraumatic.  EYES: PERRL, EOM intact; conjunctiva and sclera clear.  ENT: No nasal discharge; airway clear. TMs clear.  NECK: Supple; non tender.  CARD: S1, S2 normal; no murmurs, gallops, or rubs. Regular rate and rhythm.  RESP: Normal respiratory effort, no tachypnea or distress. Lungs CTAB, no wheezes, rales or rhonchi.  ABD: soft, NT/ND.  EXT: Normal ROM. No clubbing, cyanosis or edema.  NEURO: Alert, oriented.   PSYCH: Cooperative, appropriate.

## 2023-12-28 NOTE — H&P ADULT - CONVERSATION DETAILS
Introduced concept of goals of care with family on the phone. Discussed concepts of full medical management, limited medical care, and comfort measures. Explained concept of "full code" including need for compressions if patient were to arrest and need for intubation if patient experiencing severe respiratory distress or inability to protect airway. Additionally, explained concepts of DNR and DNI to family as well. Pt's family both expressed understanding of all concepts. At this time, they would like more time to decide. For now patient is full code.

## 2023-12-28 NOTE — ED PROVIDER NOTE - CARE PLAN
1 Principal Discharge DX:	Influenza A  Secondary Diagnosis:	Hypoxia  Secondary Diagnosis:	Sepsis   Principal Discharge DX:	Influenza A  Secondary Diagnosis:	Hypoxia  Secondary Diagnosis:	Sepsis  Secondary Diagnosis:	Elevated troponin

## 2023-12-28 NOTE — ED ADULT NURSE NOTE - NSFALLHARMRISKINTERV_ED_ALL_ED
Assistance OOB with selected safe patient handling equipment if applicable/Communicate risk of Fall with Harm to all staff, patient, and family/Monitor gait and stability/Provide patient with walking aids/Provide visual cue: red socks, yellow wristband, yellow gown, etc/Reinforce activity limits and safety measures with patient and family/Bed in lowest position, wheels locked, appropriate side rails in place/Call bell, personal items and telephone in reach/Instruct patient to call for assistance before getting out of bed/chair/stretcher/Non-slip footwear applied when patient is off stretcher/[place] to call system/Physically safe environment - no spills, clutter or unnecessary equipment/Purposeful Proactive Rounding/Room/bathroom lighting operational, light cord in reach Assistance OOB with selected safe patient handling equipment if applicable/Communicate risk of Fall with Harm to all staff, patient, and family/Monitor gait and stability/Provide patient with walking aids/Provide visual cue: red socks, yellow wristband, yellow gown, etc/Reinforce activity limits and safety measures with patient and family/Bed in lowest position, wheels locked, appropriate side rails in place/Call bell, personal items and telephone in reach/Instruct patient to call for assistance before getting out of bed/chair/stretcher/Non-slip footwear applied when patient is off stretcher/[place] to call system/Physically safe environment - no spills, clutter or unnecessary equipment/Purposeful Proactive Rounding/Room/bathroom lighting operational, light cord in reach

## 2023-12-28 NOTE — ED ADULT NURSE NOTE - HIV OFFER
[Mother] : mother [FreeTextEntry7] : 10 yr [facility] [FreeTextEntry1] : Patient brought here by parent.\par Eats a variety of foods.\par Normal sleep.\par Has friends. No concerns with behavior.\par Attends school Grade 5th, full time in person\par Inclusion, doing well, speech 2 x per week, for receptive language/memory\par sees therapist on tuesdays for "girl drama"   \par Participates in activities gym/ dance\par Does homework, pays attention in class\par Brushes teeth. Sees the dentist regularly.\par \par CONCERNS:\par Still chronic congestion\par Hasn't used flonase. when does it works.\par Has seen ENT and allergy in past.\par Headache 1x every other week.\par Better.\par Doesn't wake her from sleep Opt out

## 2023-12-28 NOTE — H&P ADULT - ASSESSMENT
89-year-old female PMH HTN, ckd, op, hypothyroidism, hyperparathyroidism presenting with fever, URI symptoms, cough, and shortness of breath x 2 days.  Worsened overnight, lives with her sons who states that she was calling them throughout the night complaining that she felt like she could not breathe.  Febrile and hypoxic to 89% on room air on arrival, improved on 2 L nasal cannula.  Denies any headache, sore throat, CP, nausea vomiting diarrhea, abdominal pain, flank pain, urinary symptoms, rash.  Sons endorse recent sick contacts, family members with URI symptoms who she was with over the holidays.    # SOB 2/2 to influenza A pneumonia   # Possible CHF exacerbation with pulmonary congestion   - Sepsis present on admission    - Heart Rate: 117 /min Respiration Rate: 28 /min. Temp (F): 100.5 Degrees F. SpO2 89 % on RA   - Chest X-ray: Bilateral interstitial opacities and likely biapical thickening, new since distant prior   - o2, nebs  - check bcx, sputum cx, Procalcitonin, MRSA pcr   - check ddimer (low suspicion of pe)  - cw 5 day of tamiflu   - IV lasix once and reassess     # Elevated troponin likely type 2 demand ischemia   - Trop HS: 620, BNP: 5063  - Repeat troponin   - Consider TTE     # Normocytic anemia   - Hb: 10.7   - MCV: 93   - Check iron studies, reticulocyte count     # HTN   - C/w amlodipine and Losartan     # Hypothyroidism   - Not on any medication   - Check TSH     # CKD stage 3a   - Baseline creatinine: 1.2   - Creatinine today: 1.1   - Avoid nephrotoxic agents    #Misc   - DVT ppx: Heparin SQ   - GI PPX: not needed  - Diet: regular   - Activity/PT eval  - Code status: Full code for now, family needs more time to decide   - Dispo: TELE

## 2023-12-28 NOTE — ED PROVIDER NOTE - OBJECTIVE STATEMENT
Pt is 89 y/o F PMHX HTN BIB EMS from home for difficulty breathing. Pt called her son stating she felt she couldn't breathe/cough, called EMS. Denied fever/chills, H/A, Visual change, CP/ Palpitations, weakness, n/v/d abd pain. UTD on vaccinations per son. Pt is [facility]. Pt is 89 y/o F PMHX HTN BIB EMS from home for difficulty breathing. Pt called her son stating she felt she couldn't breathe/cough, called EMS. Denied fever/chills, H/A, Visual change, CP/ Palpitations, weakness, n/v/d abd pain. UTD on vaccinations per son. Pt is [facility].

## 2023-12-29 ENCOUNTER — TRANSCRIPTION ENCOUNTER (OUTPATIENT)
Age: 88
End: 2023-12-29

## 2023-12-29 DIAGNOSIS — R79.89 OTHER SPECIFIED ABNORMAL FINDINGS OF BLOOD CHEMISTRY: ICD-10-CM

## 2023-12-29 DIAGNOSIS — J96.01 ACUTE RESPIRATORY FAILURE WITH HYPOXIA: ICD-10-CM

## 2023-12-29 DIAGNOSIS — Z51.5 ENCOUNTER FOR PALLIATIVE CARE: ICD-10-CM

## 2023-12-29 LAB
ALBUMIN SERPL ELPH-MCNC: 3.6 G/DL — SIGNIFICANT CHANGE UP (ref 3.5–5.2)
ALBUMIN SERPL ELPH-MCNC: 3.6 G/DL — SIGNIFICANT CHANGE UP (ref 3.5–5.2)
ALP SERPL-CCNC: 72 U/L — SIGNIFICANT CHANGE UP (ref 30–115)
ALP SERPL-CCNC: 72 U/L — SIGNIFICANT CHANGE UP (ref 30–115)
ALT FLD-CCNC: 18 U/L — SIGNIFICANT CHANGE UP (ref 0–41)
ALT FLD-CCNC: 18 U/L — SIGNIFICANT CHANGE UP (ref 0–41)
ANION GAP SERPL CALC-SCNC: 14 MMOL/L — SIGNIFICANT CHANGE UP (ref 7–14)
ANION GAP SERPL CALC-SCNC: 14 MMOL/L — SIGNIFICANT CHANGE UP (ref 7–14)
APTT BLD: 29.1 SEC — SIGNIFICANT CHANGE UP (ref 27–39.2)
APTT BLD: 29.1 SEC — SIGNIFICANT CHANGE UP (ref 27–39.2)
AST SERPL-CCNC: 91 U/L — HIGH (ref 0–41)
AST SERPL-CCNC: 91 U/L — HIGH (ref 0–41)
BASE EXCESS BLDA CALC-SCNC: 0.8 MMOL/L — SIGNIFICANT CHANGE UP (ref -2–3)
BASE EXCESS BLDA CALC-SCNC: 0.8 MMOL/L — SIGNIFICANT CHANGE UP (ref -2–3)
BASOPHILS # BLD AUTO: 0.06 K/UL — SIGNIFICANT CHANGE UP (ref 0–0.2)
BASOPHILS # BLD AUTO: 0.06 K/UL — SIGNIFICANT CHANGE UP (ref 0–0.2)
BASOPHILS NFR BLD AUTO: 0.3 % — SIGNIFICANT CHANGE UP (ref 0–1)
BASOPHILS NFR BLD AUTO: 0.3 % — SIGNIFICANT CHANGE UP (ref 0–1)
BILIRUB SERPL-MCNC: 0.4 MG/DL — SIGNIFICANT CHANGE UP (ref 0.2–1.2)
BILIRUB SERPL-MCNC: 0.4 MG/DL — SIGNIFICANT CHANGE UP (ref 0.2–1.2)
BUN SERPL-MCNC: 42 MG/DL — HIGH (ref 10–20)
BUN SERPL-MCNC: 42 MG/DL — HIGH (ref 10–20)
CALCIUM SERPL-MCNC: 9.2 MG/DL — SIGNIFICANT CHANGE UP (ref 8.4–10.4)
CALCIUM SERPL-MCNC: 9.2 MG/DL — SIGNIFICANT CHANGE UP (ref 8.4–10.4)
CHLORIDE SERPL-SCNC: 105 MMOL/L — SIGNIFICANT CHANGE UP (ref 98–110)
CHLORIDE SERPL-SCNC: 105 MMOL/L — SIGNIFICANT CHANGE UP (ref 98–110)
CO2 SERPL-SCNC: 23 MMOL/L — SIGNIFICANT CHANGE UP (ref 17–32)
CO2 SERPL-SCNC: 23 MMOL/L — SIGNIFICANT CHANGE UP (ref 17–32)
CREAT SERPL-MCNC: 1.4 MG/DL — SIGNIFICANT CHANGE UP (ref 0.7–1.5)
CREAT SERPL-MCNC: 1.4 MG/DL — SIGNIFICANT CHANGE UP (ref 0.7–1.5)
EGFR: 34 ML/MIN/1.73M2 — LOW
EGFR: 34 ML/MIN/1.73M2 — LOW
EOSINOPHIL # BLD AUTO: 0.03 K/UL — SIGNIFICANT CHANGE UP (ref 0–0.7)
EOSINOPHIL # BLD AUTO: 0.03 K/UL — SIGNIFICANT CHANGE UP (ref 0–0.7)
EOSINOPHIL NFR BLD AUTO: 0.2 % — SIGNIFICANT CHANGE UP (ref 0–8)
EOSINOPHIL NFR BLD AUTO: 0.2 % — SIGNIFICANT CHANGE UP (ref 0–8)
FERRITIN SERPL-MCNC: 283 NG/ML — SIGNIFICANT CHANGE UP (ref 13–330)
FERRITIN SERPL-MCNC: 283 NG/ML — SIGNIFICANT CHANGE UP (ref 13–330)
GLUCOSE SERPL-MCNC: 142 MG/DL — HIGH (ref 70–99)
GLUCOSE SERPL-MCNC: 142 MG/DL — HIGH (ref 70–99)
HCO3 BLDA-SCNC: 24 MMOL/L — SIGNIFICANT CHANGE UP (ref 21–28)
HCO3 BLDA-SCNC: 24 MMOL/L — SIGNIFICANT CHANGE UP (ref 21–28)
HCT VFR BLD CALC: 38.9 % — SIGNIFICANT CHANGE UP (ref 37–47)
HCT VFR BLD CALC: 38.9 % — SIGNIFICANT CHANGE UP (ref 37–47)
HGB BLD-MCNC: 13.2 G/DL — SIGNIFICANT CHANGE UP (ref 12–16)
HGB BLD-MCNC: 13.2 G/DL — SIGNIFICANT CHANGE UP (ref 12–16)
IMM GRANULOCYTES NFR BLD AUTO: 0.6 % — HIGH (ref 0.1–0.3)
IMM GRANULOCYTES NFR BLD AUTO: 0.6 % — HIGH (ref 0.1–0.3)
INR BLD: 1.03 RATIO — SIGNIFICANT CHANGE UP (ref 0.65–1.3)
INR BLD: 1.03 RATIO — SIGNIFICANT CHANGE UP (ref 0.65–1.3)
IRON SATN MFR SERPL: 11 % — LOW (ref 15–50)
IRON SATN MFR SERPL: 11 % — LOW (ref 15–50)
IRON SATN MFR SERPL: 21 UG/DL — LOW (ref 35–150)
IRON SATN MFR SERPL: 21 UG/DL — LOW (ref 35–150)
LACTATE SERPL-SCNC: 1.3 MMOL/L — SIGNIFICANT CHANGE UP (ref 0.7–2)
LACTATE SERPL-SCNC: 1.3 MMOL/L — SIGNIFICANT CHANGE UP (ref 0.7–2)
LYMPHOCYTES # BLD AUTO: 18.1 % — LOW (ref 20.5–51.1)
LYMPHOCYTES # BLD AUTO: 18.1 % — LOW (ref 20.5–51.1)
LYMPHOCYTES # BLD AUTO: 3.51 K/UL — HIGH (ref 1.2–3.4)
LYMPHOCYTES # BLD AUTO: 3.51 K/UL — HIGH (ref 1.2–3.4)
MAGNESIUM SERPL-MCNC: 1.8 MG/DL — SIGNIFICANT CHANGE UP (ref 1.8–2.4)
MAGNESIUM SERPL-MCNC: 1.8 MG/DL — SIGNIFICANT CHANGE UP (ref 1.8–2.4)
MCHC RBC-ENTMCNC: 31.1 PG — HIGH (ref 27–31)
MCHC RBC-ENTMCNC: 31.1 PG — HIGH (ref 27–31)
MCHC RBC-ENTMCNC: 33.9 G/DL — SIGNIFICANT CHANGE UP (ref 32–37)
MCHC RBC-ENTMCNC: 33.9 G/DL — SIGNIFICANT CHANGE UP (ref 32–37)
MCV RBC AUTO: 91.5 FL — SIGNIFICANT CHANGE UP (ref 81–99)
MCV RBC AUTO: 91.5 FL — SIGNIFICANT CHANGE UP (ref 81–99)
MONOCYTES # BLD AUTO: 1.31 K/UL — HIGH (ref 0.1–0.6)
MONOCYTES # BLD AUTO: 1.31 K/UL — HIGH (ref 0.1–0.6)
MONOCYTES NFR BLD AUTO: 6.8 % — SIGNIFICANT CHANGE UP (ref 1.7–9.3)
MONOCYTES NFR BLD AUTO: 6.8 % — SIGNIFICANT CHANGE UP (ref 1.7–9.3)
NEUTROPHILS # BLD AUTO: 14.32 K/UL — HIGH (ref 1.4–6.5)
NEUTROPHILS # BLD AUTO: 14.32 K/UL — HIGH (ref 1.4–6.5)
NEUTROPHILS NFR BLD AUTO: 74 % — SIGNIFICANT CHANGE UP (ref 42.2–75.2)
NEUTROPHILS NFR BLD AUTO: 74 % — SIGNIFICANT CHANGE UP (ref 42.2–75.2)
NRBC # BLD: 0 /100 WBCS — SIGNIFICANT CHANGE UP (ref 0–0)
NRBC # BLD: 0 /100 WBCS — SIGNIFICANT CHANGE UP (ref 0–0)
PCO2 BLDA: 34 MMHG — SIGNIFICANT CHANGE UP (ref 32–45)
PCO2 BLDA: 34 MMHG — SIGNIFICANT CHANGE UP (ref 32–45)
PH BLDA: 7.46 — HIGH (ref 7.35–7.45)
PH BLDA: 7.46 — HIGH (ref 7.35–7.45)
PLATELET # BLD AUTO: 173 K/UL — SIGNIFICANT CHANGE UP (ref 130–400)
PLATELET # BLD AUTO: 173 K/UL — SIGNIFICANT CHANGE UP (ref 130–400)
PMV BLD: 12.2 FL — HIGH (ref 7.4–10.4)
PMV BLD: 12.2 FL — HIGH (ref 7.4–10.4)
PO2 BLDA: 59 MMHG — LOW (ref 83–108)
PO2 BLDA: 59 MMHG — LOW (ref 83–108)
POTASSIUM SERPL-MCNC: 4.3 MMOL/L — SIGNIFICANT CHANGE UP (ref 3.5–5)
POTASSIUM SERPL-MCNC: 4.3 MMOL/L — SIGNIFICANT CHANGE UP (ref 3.5–5)
POTASSIUM SERPL-SCNC: 4.3 MMOL/L — SIGNIFICANT CHANGE UP (ref 3.5–5)
POTASSIUM SERPL-SCNC: 4.3 MMOL/L — SIGNIFICANT CHANGE UP (ref 3.5–5)
PROCALCITONIN SERPL-MCNC: 0.18 NG/ML — HIGH (ref 0.02–0.1)
PROCALCITONIN SERPL-MCNC: 0.18 NG/ML — HIGH (ref 0.02–0.1)
PROT SERPL-MCNC: 6.4 G/DL — SIGNIFICANT CHANGE UP (ref 6–8)
PROT SERPL-MCNC: 6.4 G/DL — SIGNIFICANT CHANGE UP (ref 6–8)
PROTHROM AB SERPL-ACNC: 11.7 SEC — SIGNIFICANT CHANGE UP (ref 9.95–12.87)
PROTHROM AB SERPL-ACNC: 11.7 SEC — SIGNIFICANT CHANGE UP (ref 9.95–12.87)
RBC # BLD: 4.22 M/UL — SIGNIFICANT CHANGE UP (ref 4.2–5.4)
RBC # BLD: 4.22 M/UL — SIGNIFICANT CHANGE UP (ref 4.2–5.4)
RBC # BLD: 4.25 M/UL — SIGNIFICANT CHANGE UP (ref 4.2–5.4)
RBC # BLD: 4.25 M/UL — SIGNIFICANT CHANGE UP (ref 4.2–5.4)
RBC # FLD: 12.4 % — SIGNIFICANT CHANGE UP (ref 11.5–14.5)
RBC # FLD: 12.4 % — SIGNIFICANT CHANGE UP (ref 11.5–14.5)
RETICS #: 49.8 K/UL — SIGNIFICANT CHANGE UP (ref 25–125)
RETICS #: 49.8 K/UL — SIGNIFICANT CHANGE UP (ref 25–125)
RETICS/RBC NFR: 1.2 % — SIGNIFICANT CHANGE UP (ref 0.5–1.5)
RETICS/RBC NFR: 1.2 % — SIGNIFICANT CHANGE UP (ref 0.5–1.5)
SODIUM SERPL-SCNC: 142 MMOL/L — SIGNIFICANT CHANGE UP (ref 135–146)
SODIUM SERPL-SCNC: 142 MMOL/L — SIGNIFICANT CHANGE UP (ref 135–146)
TIBC SERPL-MCNC: 196 UG/DL — LOW (ref 220–430)
TIBC SERPL-MCNC: 196 UG/DL — LOW (ref 220–430)
TRANSFERRIN SERPL-MCNC: 157 MG/DL — LOW (ref 200–360)
TRANSFERRIN SERPL-MCNC: 157 MG/DL — LOW (ref 200–360)
TROPONIN T, HIGH SENSITIVITY RESULT: 1879 NG/L — CRITICAL HIGH (ref 6–13)
TROPONIN T, HIGH SENSITIVITY RESULT: 1879 NG/L — CRITICAL HIGH (ref 6–13)
TROPONIN T, HIGH SENSITIVITY RESULT: 1940 NG/L — CRITICAL HIGH (ref 6–13)
TROPONIN T, HIGH SENSITIVITY RESULT: 1940 NG/L — CRITICAL HIGH (ref 6–13)
TSH SERPL-MCNC: 0.4 UIU/ML — SIGNIFICANT CHANGE UP (ref 0.27–4.2)
TSH SERPL-MCNC: 0.4 UIU/ML — SIGNIFICANT CHANGE UP (ref 0.27–4.2)
UIBC SERPL-MCNC: 175 UG/DL — SIGNIFICANT CHANGE UP (ref 110–370)
UIBC SERPL-MCNC: 175 UG/DL — SIGNIFICANT CHANGE UP (ref 110–370)
WBC # BLD: 19.34 K/UL — HIGH (ref 4.8–10.8)
WBC # BLD: 19.34 K/UL — HIGH (ref 4.8–10.8)
WBC # FLD AUTO: 19.34 K/UL — HIGH (ref 4.8–10.8)
WBC # FLD AUTO: 19.34 K/UL — HIGH (ref 4.8–10.8)

## 2023-12-29 PROCEDURE — 99498 ADVNCD CARE PLAN ADDL 30 MIN: CPT

## 2023-12-29 PROCEDURE — 99223 1ST HOSP IP/OBS HIGH 75: CPT

## 2023-12-29 PROCEDURE — 71045 X-RAY EXAM CHEST 1 VIEW: CPT | Mod: 26

## 2023-12-29 PROCEDURE — 99291 CRITICAL CARE FIRST HOUR: CPT | Mod: GC

## 2023-12-29 PROCEDURE — 99497 ADVNCD CARE PLAN 30 MIN: CPT | Mod: 25

## 2023-12-29 PROCEDURE — 99233 SBSQ HOSP IP/OBS HIGH 50: CPT

## 2023-12-29 RX ORDER — ASPIRIN/CALCIUM CARB/MAGNESIUM 324 MG
150 TABLET ORAL ONCE
Refills: 0 | Status: COMPLETED | OUTPATIENT
Start: 2023-12-29 | End: 2023-12-29

## 2023-12-29 RX ORDER — ASPIRIN/CALCIUM CARB/MAGNESIUM 324 MG
81 TABLET ORAL DAILY
Refills: 0 | Status: DISCONTINUED | OUTPATIENT
Start: 2023-12-29 | End: 2023-12-29

## 2023-12-29 RX ORDER — PIPERACILLIN AND TAZOBACTAM 4; .5 G/20ML; G/20ML
3.38 INJECTION, POWDER, LYOPHILIZED, FOR SOLUTION INTRAVENOUS ONCE
Refills: 0 | Status: COMPLETED | OUTPATIENT
Start: 2023-12-29 | End: 2023-12-29

## 2023-12-29 RX ORDER — ASPIRIN/CALCIUM CARB/MAGNESIUM 324 MG
81 TABLET ORAL DAILY
Refills: 0 | Status: DISCONTINUED | OUTPATIENT
Start: 2023-12-30 | End: 2024-01-09

## 2023-12-29 RX ORDER — ENOXAPARIN SODIUM 100 MG/ML
75 INJECTION SUBCUTANEOUS EVERY 12 HOURS
Refills: 0 | Status: DISCONTINUED | OUTPATIENT
Start: 2023-12-29 | End: 2023-12-29

## 2023-12-29 RX ORDER — SODIUM CHLORIDE 9 MG/ML
4 INJECTION INTRAMUSCULAR; INTRAVENOUS; SUBCUTANEOUS EVERY 12 HOURS
Refills: 0 | Status: DISCONTINUED | OUTPATIENT
Start: 2023-12-29 | End: 2024-01-09

## 2023-12-29 RX ORDER — PIPERACILLIN AND TAZOBACTAM 4; .5 G/20ML; G/20ML
3.38 INJECTION, POWDER, LYOPHILIZED, FOR SOLUTION INTRAVENOUS EVERY 12 HOURS
Refills: 0 | Status: COMPLETED | OUTPATIENT
Start: 2023-12-30 | End: 2024-01-05

## 2023-12-29 RX ORDER — ATORVASTATIN CALCIUM 80 MG/1
40 TABLET, FILM COATED ORAL AT BEDTIME
Refills: 0 | Status: DISCONTINUED | OUTPATIENT
Start: 2023-12-29 | End: 2024-01-09

## 2023-12-29 RX ORDER — ASPIRIN/CALCIUM CARB/MAGNESIUM 324 MG
81 TABLET ORAL ONCE
Refills: 0 | Status: DISCONTINUED | OUTPATIENT
Start: 2023-12-29 | End: 2023-12-29

## 2023-12-29 RX ORDER — ENOXAPARIN SODIUM 100 MG/ML
75 INJECTION SUBCUTANEOUS EVERY 24 HOURS
Refills: 0 | Status: DISCONTINUED | OUTPATIENT
Start: 2023-12-30 | End: 2023-12-31

## 2023-12-29 RX ADMIN — Medication 30 MILLIGRAM(S): at 07:34

## 2023-12-29 RX ADMIN — Medication 3 MILLILITER(S): at 11:48

## 2023-12-29 RX ADMIN — AMPICILLIN SODIUM AND SULBACTAM SODIUM 200 GRAM(S): 250; 125 INJECTION, POWDER, FOR SUSPENSION INTRAMUSCULAR; INTRAVENOUS at 06:09

## 2023-12-29 RX ADMIN — Medication 30 MILLIGRAM(S): at 17:30

## 2023-12-29 RX ADMIN — AMPICILLIN SODIUM AND SULBACTAM SODIUM 200 GRAM(S): 250; 125 INJECTION, POWDER, FOR SUSPENSION INTRAMUSCULAR; INTRAVENOUS at 11:48

## 2023-12-29 RX ADMIN — Medication 60 MILLIGRAM(S): at 13:46

## 2023-12-29 RX ADMIN — Medication 3 MILLILITER(S): at 06:09

## 2023-12-29 RX ADMIN — Medication 150 MILLIGRAM(S): at 17:32

## 2023-12-29 RX ADMIN — Medication 3 MILLILITER(S): at 01:52

## 2023-12-29 RX ADMIN — Medication 20 MILLIGRAM(S): at 06:10

## 2023-12-29 RX ADMIN — HEPARIN SODIUM 5000 UNIT(S): 5000 INJECTION INTRAVENOUS; SUBCUTANEOUS at 06:10

## 2023-12-29 RX ADMIN — CINACALCET 30 MILLIGRAM(S): 30 TABLET, FILM COATED ORAL at 11:48

## 2023-12-29 RX ADMIN — PIPERACILLIN AND TAZOBACTAM 25 GRAM(S): 4; .5 INJECTION, POWDER, LYOPHILIZED, FOR SOLUTION INTRAVENOUS at 21:36

## 2023-12-29 RX ADMIN — ENOXAPARIN SODIUM 75 MILLIGRAM(S): 100 INJECTION SUBCUTANEOUS at 15:31

## 2023-12-29 RX ADMIN — Medication 3 MILLILITER(S): at 15:27

## 2023-12-29 RX ADMIN — PIPERACILLIN AND TAZOBACTAM 200 GRAM(S): 4; .5 INJECTION, POWDER, LYOPHILIZED, FOR SOLUTION INTRAVENOUS at 17:34

## 2023-12-29 RX ADMIN — AMPICILLIN SODIUM AND SULBACTAM SODIUM 200 GRAM(S): 250; 125 INJECTION, POWDER, FOR SUSPENSION INTRAMUSCULAR; INTRAVENOUS at 01:52

## 2023-12-29 RX ADMIN — SODIUM CHLORIDE 4 MILLILITER(S): 9 INJECTION INTRAMUSCULAR; INTRAVENOUS; SUBCUTANEOUS at 21:36

## 2023-12-29 RX ADMIN — Medication 3 MILLILITER(S): at 21:36

## 2023-12-29 RX ADMIN — Medication 3 MILLILITER(S): at 07:34

## 2023-12-29 NOTE — CONSULT NOTE ADULT - SUBJECTIVE AND OBJECTIVE BOX
Cardiologist:    HPI:  89-year-old female PMH HTN, ckd, op, hypothyroidism, hyperparathyroidism presenting with fever, URI symptoms, cough, and shortness of breath x 2 days.  Worsened overnight, lives with her sons who states that she was calling them throughout the night complaining that she felt like she could not breathe.  Febrile and hypoxic to 89% on room air on arrival, improved on 2 L nasal cannula.  Denies any headache, sore throat, CP, nausea vomiting diarrhea, abdominal pain, flank pain, urinary symptoms, rash.  Sons endorse recent sick contacts, family members with URI symptoms who she was with over the holidays.    In ED:   - Vital signs: BP:133/ 57  Heart Rate: 117 /min Respiration Rate: 28 /min. Temp (F): 100.5 Degrees F. SpO2 89 % on RA   - Labs were significant for: Hb: 10.7, Trop HS: 620, BNP: 5063, AST:49  - Chest X-ray: Bilateral interstitial opacities and likely biapical thickening, new since distant prior.       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  Mother:   Father:   Siblings:     SOCIAL HISTORY:  []smoker  []Alcohol  []Drug    ALLERGIES:  No Known Allergies      MEDICATIONS:  MEDICATIONS  (STANDING):  albuterol/ipratropium for Nebulization 3 milliLiter(s) Nebulizer every 4 hours  ampicillin/sulbactam  IVPB      ampicillin/sulbactam  IVPB 3 Gram(s) IV Intermittent every 6 hours  cinacalcet 30 milliGRAM(s) Oral daily  furosemide   Injectable 20 milliGRAM(s) IV Push daily  heparin   Injectable 5000 Unit(s) SubCutaneous every 12 hours  oseltamivir 30 milliGRAM(s) Oral two times a day    MEDICATIONS  (PRN):      HOME MEDICATIONS:  Home Medications:  losartan 25 mg oral tablet: 1 tab(s) orally once a day (28 Dec 2023 13:26)  Sensipar: 30  orally once a day (28 Dec 2023 13:26)      VITALS:   T(F): 97 (12-29 @ 07:48), Max: 100.5 (12-28 @ 05:10)  HR: 109 (12-29 @ 07:48) (93 - 132)  BP: 131/63 (12-29 @ 07:48) (111/58 - 166/75)  BP(mean): --  RR: 20 (12-29 @ 06:28) (18 - 28)  SpO2: 95% (12-29 @ 06:28) (88% - 100%)    I&O's Summary      REVIEW OF SYSTEMS:    Negative except as mentioned in HPI    CONSTITUTIONAL: No weakness, fevers or chills  EYES: No visual changes  ENT: No vertigo or throat pain   NECK: No pain or stiffness  RESPIRATORY: No cough, wheezing, hemoptysis; No shortness of breath  CARDIOVASCULAR: No chest pain or palpitations  GASTROINTESTINAL: No abdominal or epigastric pain. No nausea, vomiting, or hematemesis; No diarrhea or constipation. No melena or hematochezia.  GENITOURINARY: No dysuria, frequency or hematuria  NEUROLOGICAL: No numbness or weakness  SKIN: No itching, no rashes  MSK: FROM x4    PHYSICAL EXAM:  NEURO: Awake , alert and oriented  GEN: Not in acute distress  NECK: No JVD  LUNGS: Clear to auscultation bilaterally   CARDIOVASCULAR: S1/S2 present, RRR , no murmurs or rubs, no carotid bruits,  + PP bilaterally  ABD: Soft, non-tender, non-distended, +BS  EXT: No JR  SKIN: Intact    LABS:                        10.7   5.58  )-----------( 134      ( 28 Dec 2023 07:39 )             32.0     12-28    140  |  109  |  34<H>  ----------------------------<  142<H>  4.6   |  23  |  1.1    Ca    8.7      28 Dec 2023 07:39    TPro  6.3  /  Alb  3.7  /  TBili  0.3  /  DBili  x   /  AST  49<H>  /  ALT  10  /  AlkPhos  76  12-28    PT/INR - ( 28 Dec 2023 07:39 )   PT: 12.50 sec;   INR: 1.10 ratio         PTT - ( 28 Dec 2023 07:39 )  PTT:27.8 sec          Troponin trend:          RADIOLOGY:  -CXR:  -TTE:  -CCTA:  -STRESS TEST:  -CATHETERIZATION:    ECG:    TELEMETRY EVENTS:

## 2023-12-29 NOTE — DISCHARGE NOTE NURSING/CASE MANAGEMENT/SOCIAL WORK - PATIENT PORTAL LINK FT
You can access the FollowMyHealth Patient Portal offered by [facility] by registering at the following website: [email or web address]. By joining [facility]’s FollowMyHealth portal, you will also be able to view your health information using other applications (apps) compatible with our system. You can access the FollowMyHealth Patient Portal offered by [facility] by registering at the following website: [email or web address]. By joining [facility]’s FollowMyHealth portal, you will also be able to view your health information using other applications (apps) compatible with our system.

## 2023-12-29 NOTE — CONSULT NOTE ADULT - NS ATTEST RISK PROBLEM GEN_ALL_CORE FT
[x ] 1 acute or chronic illnesses or injuries that may pose a threat to life or bodily function  [ x] Drug therapy requiring intensive monitoring for toxicity   [ x] Decision not to resuscitate, not to intubate, or to de-escalate care

## 2023-12-29 NOTE — CONSULT NOTE ADULT - ATTENDING COMMENTS
Ms. Georges was seen and examined at bedside today, the patient was admitted with influenza and placed on BiPAP for respiratory support.  Unfortunately while on this intervention, patient is believed to have aspirated now leading to apparent pneumonitis on her chest x-ray.  Patient is requiring some increasing doses of oxygen, and is unable to tolerate high flow nasal cannula.  As the patient is currently DNR/DNI, intubation is not an option.  We will continue to trial NIPPV cautiously with one-to-one monitoring to prevent further aspiration.  Patient is at high risk of decompensation as she is unable to tolerate appropriate respiratory parameters for her condition.  Agree with palliative consult and ongoing evaluation with palliative team.  If patient should aspirate again, recommend consideration of CMO.  I agree with the fellow note, with the exceptions listed in my attestation above.  The remainder of impression and plan per fellow note.

## 2023-12-29 NOTE — PATIENT PROFILE ADULT - FALL HARM RISK - HARM RISK INTERVENTIONS
Assistance with ambulation/Assistance OOB with selected safe patient handling equipment/Communicate Risk of Fall with Harm to all staff/Discuss with provider need for PT consult/Monitor gait and stability/Provide patient with walking aids - walker, cane, crutches/Reinforce activity limits and safety measures with patient and family/Tailored Fall Risk Interventions/Visual Cue: Yellow wristband and red socks/Bed in lowest position, wheels locked, appropriate side rails in place/Call bell, personal items and telephone in reach/Instruct patient to call for assistance before getting out of bed or chair/Non-slip footwear when patient is out of bed/[place] to call system/Physically safe environment - no spills, clutter or unnecessary equipment/Purposeful Proactive Rounding/Room/bathroom lighting operational, light cord in reach Assistance with ambulation/Assistance OOB with selected safe patient handling equipment/Communicate Risk of Fall with Harm to all staff/Discuss with provider need for PT consult/Monitor gait and stability/Provide patient with walking aids - walker, cane, crutches/Reinforce activity limits and safety measures with patient and family/Tailored Fall Risk Interventions/Visual Cue: Yellow wristband and red socks/Bed in lowest position, wheels locked, appropriate side rails in place/Call bell, personal items and telephone in reach/Instruct patient to call for assistance before getting out of bed or chair/Non-slip footwear when patient is out of bed/[place] to call system/Physically safe environment - no spills, clutter or unnecessary equipment/Purposeful Proactive Rounding/Room/bathroom lighting operational, light cord in reach

## 2023-12-29 NOTE — PROGRESS NOTE ADULT - SUBJECTIVE AND OBJECTIVE BOX
Patient is a 89y old  Female who presents with a chief complaint of shortness of breath (12-29-23)      Pt seen and examined at bedside. No CP or SOB.      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    H/O total knee replacement, bilateral    H/O carpal tunnel repair  B/L        VITAL SIGNS (Last 24 hrs):  T(C): 37.6 (12-29-23 @ 14:40), Max: 37.6 (12-29-23 @ 14:40)  HR: 115 (12-29-23 @ 15:35) (94 - 132)  BP: 143/82 (12-29-23 @ 15:35) (111/58 - 166/75)  RR: 20 (12-29-23 @ 15:35) (20 - 25)  SpO2: 97% (12-29-23 @ 15:35) (88% - 99%)  Wt(kg): --  Daily     Daily     I&O's Summary      PHYSICAL EXAM:  GENERAL: NAD, well-developed  HEAD:  Atraumatic, Normocephalic  EYES: EOMI, PERRLA, conjunctiva and sclera clear  NECK: Supple, No JVD  CHEST/LUNG: Clear to auscultation bilaterally; No wheeze  HEART: Regular rate and rhythm; No murmurs, rubs, or gallops  ABDOMEN: Soft, Nontender, Nondistended; Bowel sounds present  EXTREMITIES:  2+ Peripheral Pulses, No clubbing, cyanosis, or edema  PSYCH: AAOx3  NEUROLOGY: non-focal  SKIN: No rashes or lesions    Labs Reviewed  Spoke to patient in regards to abnormal labs.    CBC Full  -  ( 29 Dec 2023 14:34 )  WBC Count : 19.34 K/uL  Hemoglobin : 13.2 g/dL  Hematocrit : 38.9 %  Platelet Count - Automated : 173 K/uL  Mean Cell Volume : 91.5 fL  Mean Cell Hemoglobin : 31.1 pg  Mean Cell Hemoglobin Concentration : 33.9 g/dL  Auto Neutrophil # : 14.32 K/uL  Auto Lymphocyte # : 3.51 K/uL  Auto Monocyte # : 1.31 K/uL  Auto Eosinophil # : 0.03 K/uL  Auto Basophil # : 0.06 K/uL  Auto Neutrophil % : 74.0 %  Auto Lymphocyte % : 18.1 %  Auto Monocyte % : 6.8 %  Auto Eosinophil % : 0.2 %  Auto Basophil % : 0.3 %    BMP:    12-29 @ 14:34    Blood Urea Nitrogen - 42  Calcium - 9.2  Carbond Dioxide - 23  Chloride - 105  Creatinine - 1.4  Glucose - 142  Potassium - 4.3  Sodium - 142      Hemoglobin A1c -   PT/INR - ( 29 Dec 2023 14:34 )   PT: 11.70 sec;   INR: 1.03 ratio         PTT - ( 29 Dec 2023 14:34 )  PTT:29.1 sec  Urine Culture:  12-28 @ 06:14 Urine culture: --    Culture Results:   No growth at 24 hours  Method Type: --  Organism: --  Organism Identification: --  Specimen Source: .Blood Blood-Peripheral        COVID Labs  CRP:    Procalcitonin, Serum: 0.18 ng/mL (12-28-23 @ 17:09)    D-Dimer:  1177 ng/mL DDU (12-28-23 @ 17:09)            Imaging reviewed independently and reviewed read  < from: Xray Chest 1 View-PORTABLE IMMEDIATE (Xray Chest 1 View-PORTABLE IMMEDIATE .) (12.28.23 @ 21:47) >  Impression:    Worsening opacifications.    < end of copied text >        MEDICATIONS  (STANDING):  albuterol/ipratropium for Nebulization 3 milliLiter(s) Nebulizer every 4 hours  ampicillin/sulbactam  IVPB      ampicillin/sulbactam  IVPB 3 Gram(s) IV Intermittent every 6 hours  aspirin Suppository 150 milliGRAM(s) Rectal once  atorvastatin 40 milliGRAM(s) Oral at bedtime  cinacalcet 30 milliGRAM(s) Oral daily  enoxaparin Injectable 75 milliGRAM(s) SubCutaneous every 12 hours  furosemide   Injectable 20 milliGRAM(s) IV Push daily  oseltamivir 30 milliGRAM(s) Oral two times a day  sodium chloride 3%  Inhalation 4 milliLiter(s) Inhalation every 12 hours    MEDICATIONS  (PRN):

## 2023-12-29 NOTE — CONSULT NOTE ADULT - ASSESSMENT
89yFemale with history of HTN, CKD, hypothyroidism presents with SOB and respiratory failure in the setting of Flu and elevated troponin.  Palliative care consulted for GOC.      Education about palliative care provided to patient/family.  See Recs below.    Please call [phone] with questions or concerns 24/7.   We will continue to follow.    89yFemale with history of HTN, CKD, hypothyroidism presents with SOB and respiratory failure in the setting of Flu and elevated troponin.  Palliative care consulted for GOC.      Education about palliative care provided to patient/family.  See Recs below.    Please call [phone] with questions or concerns 24/7.   We will continue to follow.

## 2023-12-29 NOTE — CONSULT NOTE ADULT - SUBJECTIVE AND OBJECTIVE BOX
HPI:  89 F with fever, URI symptoms, cough, and shortness of breath x 2 days.  Worsened overnight, lives with her sons who states that she was calling them throughout the night complaining that she felt like she could not breathe.  Febrile and hypoxic to 89% on room air on arrival, improved on 2 L nasal cannula.  Denies any headache, sore throat, CP, nausea vomiting diarrhea, abdominal pain, flank pain, urinary symptoms, rash.  Sons endorse recent sick contacts, family members with URI symptoms who she was with over the holidays.        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      H/O total knee replacement, bilateral      H/O carpal tunnel repair  B/L          SOCIAL HISTORY: Denies EtOH, smoking or drug use    Home Medications:  losartan 25 mg oral tablet: 1 tab(s) orally once a day (28 Dec 2023 13:26)  Sensipar: 30  orally once a day (28 Dec 2023 13:26)      MEDICATIONS  (STANDING):  albuterol/ipratropium for Nebulization 3 milliLiter(s) Nebulizer every 4 hours  ampicillin/sulbactam  IVPB      ampicillin/sulbactam  IVPB 3 Gram(s) IV Intermittent every 6 hours  cinacalcet 30 milliGRAM(s) Oral daily  furosemide   Injectable 20 milliGRAM(s) IV Push daily  heparin   Injectable 5000 Unit(s) SubCutaneous every 12 hours  oseltamivir 30 milliGRAM(s) Oral two times a day      REVIEW OF SYSTEMS:  CONSTITUTIONAL: No fever, weight loss, fatigue  NECK: No pain or stiffness  RESPIRATORY: See HPI  CARDIOVASCULAR: See HPI  GASTROINTESTINAL: No abdominal/epigastric pain, nausea, vomiting, hematemesis, diarrhea, constipation, melena or hematochezia  GENITOURINARY: No dysuria, frequency, hematuria, incontinence  NEUROLOGICAL: No headaches, memory loss, loss of strength, numbness, tremors  SKIN: No itching, burning, rashes, lesions   ENDOCRINE: No heat/cold intolerance or hair loss  MUSCULOSKELETAL: No joint pain or swelling  HEME/LYMPH: No easy bruising or bleeding gums    Vital Signs Last 24 Hrs  T(C): 36.1 (29 Dec 2023 07:48), Max: 37 (28 Dec 2023 16:21)  T(F): 97 (29 Dec 2023 07:48), Max: 98.6 (28 Dec 2023 16:21)  HR: 109 (29 Dec 2023 07:48) (93 - 132)  BP: 131/63 (29 Dec 2023 07:48) (111/58 - 166/75)  BP(mean): --  RR: 20 (29 Dec 2023 06:28) (18 - 25)  SpO2: 95% (29 Dec 2023 06:28) (88% - 99%)    Parameters below as of 29 Dec 2023 07:48  Patient On (Oxygen Delivery Method): BiPAP/CPAP        PHYSICAL EXAM:  General: NAD  HEENT: NCAT, EOMI  Neck: supple, no JVD  CV:   Respiratory:   Abdomen: soft, NT/ND  Extremities: warm  Neuro: Nonfocal                            10.7   5.58  )-----------( 134      ( 28 Dec 2023 07:39 )             32.0         12-28    140  |  109  |  34<H>  ----------------------------<  142<H>  4.6   |  23  |  1.1    Ca    8.7      28 Dec 2023 07:39    TPro  6.3  /  Alb  3.7  /  TBili  0.3  /  DBili  x   /  AST  49<H>  /  ALT  10  /  AlkPhos  76  12-28     HPI:  89 F with fever, URI symptoms, cough, and shortness of breath x 2 days.  Worsened overnight, lives with her sons who states that she was calling them throughout the night complaining that she felt like she could not breathe.  Febrile and hypoxic to 89% on room air on arrival, improved on 2 L nasal cannula.  Denies any headache, sore throat, CP, nausea vomiting diarrhea, abdominal pain, flank pain, urinary symptoms, rash.  Sons endorse recent sick contacts, family members with URI symptoms who she was with over the holidays.        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      H/O total knee replacement, bilateral      H/O carpal tunnel repair  B/L          SOCIAL HISTORY: Denies EtOH, smoking or drug use    Home Medications:  losartan 25 mg oral tablet: 1 tab(s) orally once a day (28 Dec 2023 13:26)  Sensipar: 30  orally once a day (28 Dec 2023 13:26)      MEDICATIONS  (STANDING):  albuterol/ipratropium for Nebulization 3 milliLiter(s) Nebulizer every 4 hours  ampicillin/sulbactam  IVPB      ampicillin/sulbactam  IVPB 3 Gram(s) IV Intermittent every 6 hours  cinacalcet 30 milliGRAM(s) Oral daily  furosemide   Injectable 20 milliGRAM(s) IV Push daily  heparin   Injectable 5000 Unit(s) SubCutaneous every 12 hours  oseltamivir 30 milliGRAM(s) Oral two times a day      REVIEW OF SYSTEMS:  CONSTITUTIONAL: No fever, weight loss, fatigue  NECK: No pain or stiffness  RESPIRATORY: See HPI  CARDIOVASCULAR: See HPI  GASTROINTESTINAL: No abdominal/epigastric pain, nausea, vomiting, hematemesis, diarrhea, constipation, melena or hematochezia  GENITOURINARY: No dysuria, frequency, hematuria, incontinence  NEUROLOGICAL: No headaches, memory loss, loss of strength, numbness, tremors  SKIN: No itching, burning, rashes, lesions   ENDOCRINE: No heat/cold intolerance or hair loss  MUSCULOSKELETAL: No joint pain or swelling  HEME/LYMPH: No easy bruising or bleeding gums    Vital Signs Last 24 Hrs  T(C): 36.1 (29 Dec 2023 07:48), Max: 37 (28 Dec 2023 16:21)  T(F): 97 (29 Dec 2023 07:48), Max: 98.6 (28 Dec 2023 16:21)  HR: 109 (29 Dec 2023 07:48) (93 - 132)  BP: 131/63 (29 Dec 2023 07:48) (111/58 - 166/75)  BP(mean): --  RR: 20 (29 Dec 2023 06:28) (18 - 25)  SpO2: 95% (29 Dec 2023 06:28) (88% - 99%)    Parameters below as of 29 Dec 2023 07:48  Patient On (Oxygen Delivery Method): BiPAP/CPAP        PHYSICAL EXAM:  General: on BiPAP  HEENT: NCAT, EOMI  Neck: supple, no JVD  CV: RRR, no murmurs, no edema  Respiratory: coarse breath sounds  Abdomen: soft, NT/ND  Extremities: warm  Neuro: Nonfocal                            10.7   5.58  )-----------( 134      ( 28 Dec 2023 07:39 )             32.0         12-28    140  |  109  |  34<H>  ----------------------------<  142<H>  4.6   |  23  |  1.1    Ca    8.7      28 Dec 2023 07:39    TPro  6.3  /  Alb  3.7  /  TBili  0.3  /  DBili  x   /  AST  49<H>  /  ALT  10  /  AlkPhos  76  12-28

## 2023-12-29 NOTE — DISCHARGE NOTE NURSING/CASE MANAGEMENT/SOCIAL WORK - NSDCPEFALRISK_GEN_ALL_CORE
For information on Fall & Injury Prevention, visit: [email or web address] OR  [email or web address] OR  https://www.cdc.gov/steadi/patient.html For information on Fall & Injury Prevention, visit: [email or web address] OR  [email or web address] OR  https://www.cdc.gov/steadi/patient.html

## 2023-12-29 NOTE — CONSULT NOTE ADULT - ASSESSMENT
NSTEMI Type 2  Influenza A multilobar PNA with mild superimposed PVC      Incomplete   89 F admitted with Influenza A multilobar PNA with mild superimposed PVC.    NSTEMI Type 2  Acute CHF, undifferentiated, with mild decompensation      Awake, alert, tolerating BiPAP  No chest pain  No LE edema      - Medical management of NSTEMI  - Lasix 20 mg IV daily for 48-72 hours then reevaluate  - Aspirin 81 mg and Atorvastatin  - Would not start IV Heparin due to advanced age and elevated risk of bleeding  - TTE to evaluate LVEF  - Palliative care consult

## 2023-12-29 NOTE — CONSULT NOTE ADULT - CONVERSATION DETAILS
Spoke with patient at bedside. Palliative care introduced.  She was able to provide a medical history and hospital course.  We discussed overall GOC, and noted the patient discussed code status with her family last night and made herself DNR only.    She noted that she was unsure if she wanted to be Full code and wanted to discuss it with her family. We called her HCP Bere over the phone along with her son and discussed code status. Bere noted they spoke the previous night and the patient had wanted DNR/DNI, because she did not wish to live on machines and did not want CPR. She noted she wishes to be DNR/DNI with trial of NIV. All questions answered.

## 2023-12-29 NOTE — CHART NOTE - NSCHARTNOTEFT_GEN_A_CORE
Patient has been on bipap for hypercapnic respiratory failure 2/2 influenza A c/b COPD. Patient has been NPO all day, but vomited while on bipap around 8pm. At that time bipap was removed, however patient went into severe respiratory distress; tachypneic, tachycardic, accessory muscle use, desaturating. Decision was made to replace the bipap. A trial of HFNC with the started, with hopes that the small PEEP provided would allow patient to breath more comfortably. Unfortunately patient was unable to tolerate it. After discussing with the family and explaining the risks with the family, the decision was made to replace the bipap. At this time patient is breathing more comfortably, NSR, satting 99%. Chest xray was preformed showing multiple bilateral opacities R>L, indicative of aspiration pneumonia vs pneumonitis. Unasyn 3g ordered q6. Follow up AM Chest xray.

## 2023-12-29 NOTE — PROGRESS NOTE ADULT - CONVERSATION DETAILS
Goals of Care Conversation done with pt son evi. Discussed FULL CODE VS DNR/DNI. CPR and intubation discussed with Pt son The family wishes the pt to be DNR/DNI with NIV trial.  . All questions answered. EWA signed and placed in chart

## 2023-12-29 NOTE — CONSULT NOTE ADULT - SUBJECTIVE AND OBJECTIVE BOX
Patient is a 89y old  Female who presents with a chief complaint of shortness of breath (29 Dec 2023 09:39)      HPI:  89-year-old female PMH HTN, ckd, op, hypothyroidism, hyperparathyroidism presenting with fever, URI symptoms, cough, and shortness of breath x 2 days.  Worsened overnight, lives with her sons who states that she was calling them throughout the night complaining that she felt like she could not breathe.  Febrile and hypoxic to 89% on room air on arrival, improved on 2 L nasal cannula.  Denies any headache, sore throat, CP, nausea vomiting diarrhea, abdominal pain, flank pain, urinary symptoms, rash.  Sons endorse recent sick contacts, family members with URI symptoms who she was with over the holidays.    In ED:   - Vital signs: BP:133/ 57  Heart Rate: 117 /min Respiration Rate: 28 /min. Temp (F): 100.5 Degrees F. SpO2 89 % on RA   - Labs were significant for: Hb: 10.7, Trop HS: 620, BNP: 5063, AST:49  - Chest X-ray: Bilateral interstitial opacities and likely biapical thickening, new since distant prior.       (28 Dec 2023 12:46)    Overnight patient had a episode of vomiting and worsening hypoxemia after a while on BIPAP. SHe had worsening infiltrates and worsening hypoxemia. Called to evaluate for closer monitoring. Patient seen on 5 L NC. Sat well.    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      H/O total knee replacement, bilateral      H/O carpal tunnel repair  B/L          SOCIAL HX:   Smoking                         ETOH                            Other    FAMILY HISTORY:  :  No known cardiovacular family hisotry     Review Of Systems:     All ROS are negative except per HPI       Allergies    No Known Allergies    Intolerances          PHYSICAL EXAM    ICU Vital Signs Last 24 Hrs  T(C): 36.1 (29 Dec 2023 07:48), Max: 37 (28 Dec 2023 16:21)  T(F): 97 (29 Dec 2023 07:48), Max: 98.6 (28 Dec 2023 16:21)  HR: 109 (29 Dec 2023 07:48) (93 - 132)  BP: 131/63 (29 Dec 2023 07:48) (111/58 - 166/75)  BP(mean): --  ABP: --  ABP(mean): --  RR: 20 (29 Dec 2023 06:28) (18 - 25)  SpO2: 95% (29 Dec 2023 06:28) (88% - 99%)    O2 Parameters below as of 29 Dec 2023 07:48  Patient On (Oxygen Delivery Method): BiPAP/CPAP            CONSTITUTIONAL:  Ill appearing       ENT:   Airway patent,   Mouth with normal mucosa.   No thrush      CARDIAC:   Normal rate,   Regular rhythm.    No edema      Vascular:   normal systolic impulse  no bruits    RESPIRATORY:   Decreased breath sounds  Bilateral BS   Not tachypneic,  No use of accessory muscles    GASTROINTESTINAL:  Abdomen soft,   Non-tender,   No guarding,   + BS      NEUROLOGICAL:   Alert and oriented   No motor deficits.    SKIN:   Skin normal color for race,   No evidence of rash.      HEME LYMPH: .  No cervical  lymphadenopathy.  No inguinal lymphadenopathy              LABS:                          10.7   5.58  )-----------( 134      ( 28 Dec 2023 07:39 )             32.0                                               12-28    140  |  109  |  34<H>  ----------------------------<  142<H>  4.6   |  23  |  1.1    Ca    8.7      28 Dec 2023 07:39    TPro  6.3  /  Alb  3.7  /  TBili  0.3  /  DBili  x   /  AST  49<H>  /  ALT  10  /  AlkPhos  76  12-28      PT/INR - ( 28 Dec 2023 07:39 )   PT: 12.50 sec;   INR: 1.10 ratio         PTT - ( 28 Dec 2023 07:39 )  PTT:27.8 sec                                       Urinalysis Basic - ( 28 Dec 2023 07:39 )    Color: x / Appearance: x / SG: x / pH: x  Gluc: 142 mg/dL / Ketone: x  / Bili: x / Urobili: x   Blood: x / Protein: x / Nitrite: x   Leuk Esterase: x / RBC: x / WBC x   Sq Epi: x / Non Sq Epi: x / Bacteria: x                                                  LIVER FUNCTIONS - ( 28 Dec 2023 07:39 )  Alb: 3.7 g/dL / Pro: 6.3 g/dL / ALK PHOS: 76 U/L / ALT: 10 U/L / AST: 49 U/L / GGT: x                                                                                                                                   ABG - ( 29 Dec 2023 08:52 )  pH, Arterial: 7.46  pH, Blood: x     /  pCO2: 34    /  pO2: 59    / HCO3: 24    / Base Excess: 0.8   /  SaO2: x                   X-Rays reviewed                                                                                     ECHO    CXR interpreted by me     MEDICATIONS  (STANDING):  albuterol/ipratropium for Nebulization 3 milliLiter(s) Nebulizer every 4 hours  ampicillin/sulbactam  IVPB      ampicillin/sulbactam  IVPB 3 Gram(s) IV Intermittent every 6 hours  cinacalcet 30 milliGRAM(s) Oral daily  furosemide   Injectable 20 milliGRAM(s) IV Push daily  heparin   Injectable 5000 Unit(s) SubCutaneous every 12 hours  oseltamivir 30 milliGRAM(s) Oral two times a day    MEDICATIONS  (PRN):

## 2023-12-29 NOTE — CONSULT NOTE ADULT - ASSESSMENT
Impression:    Acute hypoxic respiratory failure  Influenza A  NSTEMI type 2       PLAN:    CNS: Avoid CNS depressants    HEENT: Oral care    PULMONARY:  HOB @ 45 degrees.  HFNC, wean as tolerated. Tamiflu     CARDIOVASCULAR: Check an echo. Maintain an even fluid balance    GI: GI prophylaxis.  Feeding.  Bowel regimen     RENAL:  Follow up lytes.  Correct as needed    INFECTIOUS DISEASE: Follow up cultures. Tamiflu, unasyn.    HEMATOLOGICAL:  DVT prophylaxis.    ENDOCRINE:  Follow up FS.  Insulin protocol if needed.    MUSCULOSKELETAL: OOBTC    SDU         Impression:    Acute hypoxic respiratory failure  Influenza A  NSTEMI type 2       PLAN:    CNS: Avoid CNS depressants    HEENT: Oral care    PULMONARY:  HOB @ 45 degrees.  HFNC, wean as tolerated. Tamiflu.    CARDIOVASCULAR: Check an echo. Maintain an even fluid balance    GI: GI prophylaxis.  Feeding.  Bowel regimen     RENAL:  Follow up lytes.  Correct as needed    INFECTIOUS DISEASE: Follow up cultures. Tamiflu, unasyn.    HEMATOLOGICAL:  DVT prophylaxis. Check LE dupplex. Ddimer noted.     ENDOCRINE:  Follow up FS.  Insulin protocol if needed.    MUSCULOSKELETAL: OOBTC    SDU         Impression:    Acute hypoxic respiratory failure  Influenza A  NSTEMI type 2       PLAN:    CNS: Avoid CNS depressants    HEENT: Oral care    PULMONARY:  HOB @ 45 degrees.  HFNC, wean as tolerated. Tamiflu.  If patient requires NIPPV and is unable to tolerate HFNC, recommend 1:1 monitoring to prevent aspiration    CARDIOVASCULAR: Check an echo. Maintain an even fluid balance    GI: GI prophylaxis.  Feeding.  Bowel regimen     RENAL:  Follow up lytes.  Correct as needed    INFECTIOUS DISEASE: Follow up cultures. Tamiflu, unasyn.    HEMATOLOGICAL:  DVT prophylaxis. Check LE dupplex. Ddimer noted.     ENDOCRINE:  Follow up FS.  Insulin protocol if needed.    MUSCULOSKELETAL: OOBTC        SDU

## 2023-12-29 NOTE — PROGRESS NOTE ADULT - ASSESSMENT
89-year-old female PMH HTN, ckd, op, hypothyroidism, hyperparathyroidism presenting with fever, URI symptoms, cough, and shortness of breath x 2 days.  Worsened overnight, lives with her sons who states that she was calling them throughout the night complaining that she felt like she could not breathe.  Febrile and hypoxic to 89% on room air on arrival, improved on 2 L nasal cannula.  Denies any headache, sore throat, CP, nausea vomiting diarrhea, abdominal pain, flank pain, urinary symptoms, rash.  Sons endorse recent sick contacts, family members with URI symptoms who she was with over the holidays.    # Acute hypoxemic respiratory failure 2/2 influenza pneumonia with possible  Acute CHF exacerbation   # aspiration in evening   # Sepsis present on admission  2/2 viral pneumonia   #elevated dimer  #lactemia  - Chest X-ray: Bilateral interstitial opacities and likely biapical thickening, new since distant prior   - CXr worsening infiltrates  - Pt now on bIPAP  - BNP elevated, JVD on exam   - c/w 5 day of tamiflu   - IV lasix, monitor UOP   - dw CC, consutled CC, upgrade to SDU   - DC unasyn, started zosyn   - check duplex, if neg will do CTA chest if able   - started on theraputic lovenox 75 mg daily, adjusted for crcl  -Discussed benefits and risks of starting anticoagulation including risk of excessively bleeding gums or nose bleeds, hematuria,  hemorrhagic stroke, GI bleed,  excessive bleeding after trauma or cuts and even death. Advised seek medical intervention immediately. Pt son agreed decided to start anticoagulation given benefits outweighs risk.  - follow up lactate    #NSTEMI vs ischemic demand   -ekg new TWI   - possible also from demand  - lovenox 75 mg daily for now  - asa 150 rectal   -hold Beta-Blocker therapy as per cardiology   -when able add statin     # Normocytic anemia   - monitor     # HTN   - Hold amlodipine and Losartan while on diuresis     # CKD stage 3a   - Baseline creatinine: 1.2 , 1.4 now   - Avoid nephrotoxic agents    #Progress Note Handoff  Pending (specify):  as above  Family discussion: house staff updated pt family  Disposition: SDU   Decision to admit the pt is based on acuity as above   Overall very poor prognosis

## 2023-12-29 NOTE — CONSULT NOTE ADULT - ASSESSMENT
Sepsis 2/2 PNA and Influenza  Troponin elevation 2/2 demand ischemia from above , pt with no chest pain/discomfort     -aspirin, statin, metoprolol   -TTE

## 2023-12-29 NOTE — CONSULT NOTE ADULT - PROBLEM SELECTOR RECOMMENDATION 9
In the setting of Influenza A, possible fluid overload  -HFNC management per primary team  -IV antibiotics, oseltamivir, and IV diurectics per primary team/pulm  -trend creatinine/lytes daily  -High risk

## 2023-12-29 NOTE — CONSULT NOTE ADULT - SUBJECTIVE AND OBJECTIVE BOX
CC:  SOB    HPI:  89-year-old female PMH HTN, ckd, op, hypothyroidism, hyperparathyroidism presenting with fever, URI symptoms, cough, and shortness of breath x 2 days.  Worsened overnight, lives with her sons who states that she was calling them throughout the night complaining that she felt like she could not breathe.  Febrile and hypoxic to 89% on room air on arrival, improved on 2 L nasal cannula.  Denies any headache, sore throat, CP, nausea vomiting diarrhea, abdominal pain, flank pain, urinary symptoms, rash.  Sons endorse recent sick contacts, family members with URI symptoms who she was with over the holidays.    In ED:   - Vital signs: BP:133/ 57  Heart Rate: 117 /min Respiration Rate: 28 /min. Temp (F): 100.5 Degrees F. SpO2 89 % on RA   - Labs were significant for: Hb: 10.7, Trop HS: 620, BNP: 5063, AST:49  - Chest X-ray: Bilateral interstitial opacities and likely biapical thickening, new since distant prior.       (28 Dec 2023 12:46)    PERTINENT PM/SXH:   HTN (hypertension)    Hypothyroid    Stage 3 chronic kidney disease      History of hip replacement, total, left    H/O total knee replacement, bilateral    H/O carpal tunnel repair      FAMILY HISTORY:  No pertinent history    ITEMS NOT CHECKED ARE NOT PRESENT    SOCIAL HISTORY:   Significant other/partner[ ]  Children[ ]  Restorationism/Spirituality:  Substance hx:  [ ]   Tobacco hx:  [ ]   Alcohol hx: [ ]   Living Situation: [ x]Home  [ ]Long term care  [ ]Rehab [ ]Other  Home Services: [ ] HHA [ ] Visting RN [ ] Hospice  Occupation:  Home Opioid hx:  [ ] Y [ ] N [x ] I-Stop Reference No:    Reference #: [id number]    Practitioner Count: 0  Pharmacy Count: 0  Current Opioid Prescriptions: 0  Current Benzodiazepine Prescriptions: 0  Current Stimulant Prescriptions: 0      Patient Demographic Information (PDI)       PDI	First Name	Last Name	Birth Date	Gender	Street Address	[facility] Code  A	Carissa Kay	11/06/1923	Female	[address]	[id number]    Prescription Information      PDI Filter:    PDI	Current Rx	Drug Type	Rx Written	Rx Dispensed	Drug	Quantity	Days Supply  A	N	O	05/03/2023	05/03/2023	tramadol hcl er 100 mg tablet	90	30  Prescriber Name Susi Posadas  Prescriber LAWANDA # [id number]  Payment Method Insurance  Dispenser [facility]  A	N	O	04/06/2023	04/06/2023	tramadol hcl er 100 mg tablet	60	30  Prescriber Name Susi Posadas  Prescriber LAWANDA # [id number]  Payment Method Insurance  Dispenser [facility]  A	N	O	03/23/2023	03/23/2023	tramadol hcl 50 mg tablet	60	30  Prescriber Name Susi Posadas  Prescriber LAWANDA # [id number]  Payment Method Insurance  Dispenser [facility]  A	N	B	03/10/2023	03/10/2023	lorazepam 0.5 mg tablet	120	30  Prescriber Name Susi Posadas  Prescriber LAWANDA # [id number]  Payment Method Insurance  Dispenser [facility]     ADVANCE DIRECTIVES:     [ ] Full Code [ x] DNR  MOLST  [ ]  Living Will  [ ]   DECISION MAKER(s):  [x ] Health Care Proxy(s)  [ ] Surrogate(s)  [ ] Guardian           Name(s): Phone Number(s):  [facility] ([facility]ADL(s) (prior to admission):    [place]: [ ]Total  [ ] Moderate [ ]Dependent  Palliative Performance Status Version 2:         %    http://npcrc.org/files/news/palliative_performance_scale_ppsv2.pdf    Allergies    No Known Allergies    Intolerances    MEDICATIONS  (STANDING):  albuterol/ipratropium for Nebulization 3 milliLiter(s) Nebulizer every 4 hours  ampicillin/sulbactam  IVPB      ampicillin/sulbactam  IVPB 3 Gram(s) IV Intermittent every 6 hours  aspirin Suppository 81 milliGRAM(s) Rectal once  atorvastatin 40 milliGRAM(s) Oral at bedtime  cinacalcet 30 milliGRAM(s) Oral daily  enoxaparin Injectable 75 milliGRAM(s) SubCutaneous every 12 hours  furosemide   Injectable 20 milliGRAM(s) IV Push daily  oseltamivir 30 milliGRAM(s) Oral two times a day  sodium chloride 3%  Inhalation 4 milliLiter(s) Inhalation every 12 hours    MEDICATIONS  (PRN):    PRESENT SYMPTOMS: [ ]Unable to obtain due to poor mentation   Source if other than patient:  [ ]Family   [ ]Team     Pain: [ ]yes [x ]no  QOL impact -   Location -                    Aggravating factors -  Quality -  Radiation -  Timing-  Severity (0-10 scale):  Minimal acceptable level (0-10 scale):     CPOT:    https://www.[facility].org/getattachment/[email or web address]/Critical-Care-Pain-Observation-Tool-(CPOT)    PAIN AD Score:   http://geriatrictoolkit.[facility]/cog/painad.pdf (press ctrl +  left click to view)    Dyspnea:                           [ ]None[ ]Mild [x ]Moderate [ ]Severe     Respiratory Distress Observation Scale (RDOS):   A score of 0 to 2 signifies little or no respiratory distress, 3 signifies mild distress, scores 4 to 6 indicate moderate distress, and scores greater than 7 signify severe distress  https://www.[facility].ca/sites/default/files/PDFS/774068-vdihwqsgayo-nyzikevr-axpeixrsobj-uurtv.pdf    Anxiety:                             [ ]None[ ]Mild [ ]Moderate [ ]Severe   Fatigue:                             [ ]None[ ]Mild [ ]Moderate [ ]Severe   Nausea:                             [ ]None[ ]Mild [ ]Moderate [ ]Severe   Loss of appetite:              [ ]None[ ]Mild [ ]Moderate [ ]Severe   Constipation:                    [ ]None[ ]Mild [ ]Moderate [ ]Severe    Other Symptoms:  [ ]All other review of systems negative     Palliative Performance Status Version 2:         30%    http://[facility].org/files/news/palliative_performance_scale_ppsv2.pdf    PHYSICAL EXAM:  Vital Signs Last 24 Hrs  T(C): 37.6 (29 Dec 2023 14:40), Max: 37.6 (29 Dec 2023 14:40)  T(F): 99.7 (29 Dec 2023 14:40), Max: 99.7 (29 Dec 2023 14:40)  HR: 115 (29 Dec 2023 15:35) (93 - 132)  BP: 143/82 (29 Dec 2023 15:35) (111/58 - 166/75)  BP(mean): --  RR: 20 (29 Dec 2023 15:35) (18 - 25)  SpO2: 97% (29 Dec 2023 15:35) (88% - 99%)    Parameters below as of 29 Dec 2023 15:35  Patient On (Oxygen Delivery Method): BiPAP/CPAP     I&O's Summary      GENERAL:  [ ] No acute distress [ ]Lethargic  [ ]Unarousable  [x ]Verbal  [ ]Non-Verbal [ ]Cachexia    BEHAVIORAL/PSYCH:  [x ]Alert and Oriented x2-3 [ ] Anxiety [ ] Delirium [ ] Agitation [x ] Calm   EYES: [ x] No scleral icterus [ ] Scleral icterus [ ] Closed  ENMT:  [ ]Dry mouth  [ ]No external oral lesions [ ] No external ear or nose lesions  CARDIOVASCULAR:  [ ]Regular [ ]Irregular [ ]Tachy [ ]Not Tachy  [ ]Cruz [ ] Edema [ x] No edema  PULMONARY:  [ ]Tachypnea  [ ]Audible excessive secretions [x ] No labored breathing [ ] labored breathing  GASTROINTESTINAL: [ ]Soft  [ ]Distended  [ ]Not distended [ ]Non tender [ ]Tender  MUSCULOSKELETAL: [ ]No clubbing [ ] clubbing  [x ] No cyanosis [ ] cyanosis  NEUROLOGIC: [ ]No focal deficits  [x ]Follows commands  [ ]Does not follow commands  [ ]Cognitive impairment  [ ]Dysphagia  [ ]Dysarthria  [ ]Paresis   SKIN: [ ] Jaundiced [ x] Non-jaundiced [ ]Rash [ ]No Rash [ ] Warm [ ] Dry  MISC/LINES: [ ] ET tube [ ] Trach [ ]NGT/OGT [ ]PEG [ ]Amaya    LABS: reviewed by me                        13.2   19.34 )-----------( 173      ( 29 Dec 2023 14:34 )             38.9   12-29    142  |  105  |  42<H>  ----------------------------<  142<H>  4.3   |  23  |  1.4    Ca    9.2      29 Dec 2023 14:34  Mg     1.8     12-29    TPro  6.4  /  Alb  3.6  /  TBili  0.4  /  DBili  x   /  AST  91<H>  /  ALT  18  /  AlkPhos  72  12-29  PT/INR - ( 29 Dec 2023 14:34 )   PT: 11.70 sec;   INR: 1.03 ratio         PTT - ( 29 Dec 2023 14:34 )  PTT:29.1 sec    Urinalysis Basic - ( 29 Dec 2023 14:34 )    Color: x / Appearance: x / SG: x / pH: x  Gluc: 142 mg/dL / Ketone: x  / Bili: x / Urobili: x   Blood: x / Protein: x / Nitrite: x   Leuk Esterase: x / RBC: x / WBC x   Sq Epi: x / Non Sq Epi: x / Bacteria: x      RADIOLOGY & ADDITIONAL STUDIES: reviewed by me    < from: Xray Chest 1 View-PORTABLE IMMEDIATE (Xray Chest 1 View-PORTABLE IMMEDIATE .) (12.28.23 @ 21:47) >  Impression:    Worsening opacifications.    < end of copied text >      EKG: reviewed by me    < from: 12 Lead ECG (12.28.23 @ 20:49) >  Ventricular Rate 110 BPM    Atrial Rate 110 BPM    P-R Interval 146 ms    QRS Duration 82 ms    Q-T Interval 340 ms    QTC Calculation(Bazett) 460 ms    P Axis 67 degrees    R Axis 97 degrees    T Axis 30 degrees    Diagnosis Line Sinus tachycardiawith Premature supraventricular complexes  Rightward axis  Anteroseptal infarct , age undetermined  Abnormal ECG    < end of copied text >      PROTEIN CALORIE MALNUTRITION PRESENT: [ ]mild [ ]moderate [ ]severe [ ]underweight [ ]morbid obesity  https://www.andeal.org/vault/2440/web/files/ONC/Table_Clinical%20Characteristics%20to%20Document%20Malnutrition-White%20JV%20et%20al%725832.pdf      Weight (kg): 74.8 (12-28-23 @ 05:10)  [ ]PPSV2 < or = to 30% [ ]significant weight loss  [ ]poor nutritional intake  [ ]anasarca      [ ]Artificial Nutrition      Palliative Care Spiritual/Emotional Screening Tool Question  Severity (0-4):                    OR                    [ x] Unable to determine. Will assess at later time if appropriate.  Score of 2 or greater indicates recommendation of Chaplaincy and/or SW referral  Chaplaincy Referral: [ ] Yes [ ] Refused [ ] Following     Caregiver [place]:  [ ] Yes [ ] No    OR    [x ] Unable to determine. Will assess at later time if appropriate.  Social Work Referral [ ]  Patient and Family Centered Care Referral [ ]    Anticipatory Grief Present: [ ] Yes [ ] No    OR     [ x] Unable to determine. Will assess at later time if appropriate.  Social Work Referral [ ]  Patient and Family Centered Care Referral [ ]    Patient discussed with primary medical team MD  Palliative care education provided to patient and/or family   CC:  SOB    HPI:  89-year-old female PMH HTN, ckd, op, hypothyroidism, hyperparathyroidism presenting with fever, URI symptoms, cough, and shortness of breath x 2 days.  Worsened overnight, lives with her sons who states that she was calling them throughout the night complaining that she felt like she could not breathe.  Febrile and hypoxic to 89% on room air on arrival, improved on 2 L nasal cannula.  Denies any headache, sore throat, CP, nausea vomiting diarrhea, abdominal pain, flank pain, urinary symptoms, rash.  Sons endorse recent sick contacts, family members with URI symptoms who she was with over the holidays.    In ED:   - Vital signs: BP:133/ 57  Heart Rate: 117 /min Respiration Rate: 28 /min. Temp (F): 100.5 Degrees F. SpO2 89 % on RA   - Labs were significant for: Hb: 10.7, Trop HS: 620, BNP: 5063, AST:49  - Chest X-ray: Bilateral interstitial opacities and likely biapical thickening, new since distant prior.       (28 Dec 2023 12:46)    PERTINENT PM/SXH:   HTN (hypertension)    Hypothyroid    Stage 3 chronic kidney disease      History of hip replacement, total, left    H/O total knee replacement, bilateral    H/O carpal tunnel repair      FAMILY HISTORY:  No pertinent history    ITEMS NOT CHECKED ARE NOT PRESENT    SOCIAL HISTORY:   Significant other/partner[ ]  Children[ ]  Alevism/Spirituality:  Substance hx:  [ ]   Tobacco hx:  [ ]   Alcohol hx: [ ]   Living Situation: [ x]Home  [ ]Long term care  [ ]Rehab [ ]Other  Home Services: [ ] HHA [ ] Visting RN [ ] Hospice  Occupation:  Home Opioid hx:  [ ] Y [ ] N [x ] I-Stop Reference No:    Reference #: [id number]    Practitioner Count: 0  Pharmacy Count: 0  Current Opioid Prescriptions: 0  Current Benzodiazepine Prescriptions: 0  Current Stimulant Prescriptions: 0      Patient Demographic Information (PDI)       PDI	First Name	Last Name	Birth Date	Gender	Street Address	[facility] Code  A	Carissa Kay	11/06/1923	Female	[address]	[address]    Prescription Information      PDI Filter:    PDI	Current Rx	Drug Type	Rx Written	Rx Dispensed	Drug	Quantity	Days Supply  A	N	O	05/03/2023	05/03/2023	tramadol hcl er 100 mg tablet	90	30  Prescriber Name Susi Posadas  Prescriber LAWANDA # [id number]  Payment Method Insurance  Dispenser [facility]  A	N	O	04/06/2023	04/06/2023	tramadol hcl er 100 mg tablet	60	30  Prescriber Name Susi Posadas  Prescriber LAWANDA # [id number]  Payment Method Insurance  Dispenser [facility]  A	N	O	03/23/2023	03/23/2023	tramadol hcl 50 mg tablet	60	30  Prescriber Name Susi Posadas  Prescriber LAWANDA # [id number]  Payment Method Insurance  Dispenser [facility]  A	N	B	03/10/2023	03/10/2023	lorazepam 0.5 mg tablet	120	30  Prescriber Name Susi Posadas  Prescriber LAWANDA # [id number]  Payment Method Insurance  Dispenser [facility]     ADVANCE DIRECTIVES:     [ ] Full Code [ x] DNR  MOLST  [ ]  Living Will  [ ]   DECISION MAKER(s):  [x ] Health Care Proxy(s)  [ ] Surrogate(s)  [ ] Guardian           Name(s): Phone Number(s):  [facility] ([facility])ADL(s) (prior to admission):    [place]: [ ]Total  [ ] Moderate [ ]Dependent  Palliative Performance Status Version 2:         %    http://npcrc.org/files/news/palliative_performance_scale_ppsv2.pdf    Allergies    No Known Allergies    Intolerances    MEDICATIONS  (STANDING):  albuterol/ipratropium for Nebulization 3 milliLiter(s) Nebulizer every 4 hours  ampicillin/sulbactam  IVPB      ampicillin/sulbactam  IVPB 3 Gram(s) IV Intermittent every 6 hours  aspirin Suppository 81 milliGRAM(s) Rectal once  atorvastatin 40 milliGRAM(s) Oral at bedtime  cinacalcet 30 milliGRAM(s) Oral daily  enoxaparin Injectable 75 milliGRAM(s) SubCutaneous every 12 hours  furosemide   Injectable 20 milliGRAM(s) IV Push daily  oseltamivir 30 milliGRAM(s) Oral two times a day  sodium chloride 3%  Inhalation 4 milliLiter(s) Inhalation every 12 hours    MEDICATIONS  (PRN):    PRESENT SYMPTOMS: [ ]Unable to obtain due to poor mentation   Source if other than patient:  [ ]Family   [ ]Team     Pain: [ ]yes [x ]no  QOL impact -   Location -                    Aggravating factors -  Quality -  Radiation -  Timing-  Severity (0-10 scale):  Minimal acceptable level (0-10 scale):     CPOT:    https://www.[facility].org/getattachment/[email or web address]/Critical-Care-Pain-Observation-Tool-(CPOT)    PAIN AD Score:   http://geriatrictoolkit.[facility]/cog/painad.pdf (press ctrl +  left click to view)    Dyspnea:                           [ ]None[ ]Mild [x ]Moderate [ ]Severe     Respiratory Distress Observation Scale (RDOS):   A score of 0 to 2 signifies little or no respiratory distress, 3 signifies mild distress, scores 4 to 6 indicate moderate distress, and scores greater than 7 signify severe distress  https://www.[facility].ca/sites/default/files/PDFS/367475-bwqkclbkrhc-aztwxmay-ensfuxqfwse-avsoc.pdf    Anxiety:                             [ ]None[ ]Mild [ ]Moderate [ ]Severe   Fatigue:                             [ ]None[ ]Mild [ ]Moderate [ ]Severe   Nausea:                             [ ]None[ ]Mild [ ]Moderate [ ]Severe   Loss of appetite:              [ ]None[ ]Mild [ ]Moderate [ ]Severe   Constipation:                    [ ]None[ ]Mild [ ]Moderate [ ]Severe    Other Symptoms:  [ ]All other review of systems negative     Palliative Performance Status Version 2:         30%    http://[facility].org/files/news/palliative_performance_scale_ppsv2.pdf    PHYSICAL EXAM:  Vital Signs Last 24 Hrs  T(C): 37.6 (29 Dec 2023 14:40), Max: 37.6 (29 Dec 2023 14:40)  T(F): 99.7 (29 Dec 2023 14:40), Max: 99.7 (29 Dec 2023 14:40)  HR: 115 (29 Dec 2023 15:35) (93 - 132)  BP: 143/82 (29 Dec 2023 15:35) (111/58 - 166/75)  BP(mean): --  RR: 20 (29 Dec 2023 15:35) (18 - 25)  SpO2: 97% (29 Dec 2023 15:35) (88% - 99%)    Parameters below as of 29 Dec 2023 15:35  Patient On (Oxygen Delivery Method): BiPAP/CPAP     I&O's Summary      GENERAL:  [ ] No acute distress [ ]Lethargic  [ ]Unarousable  [x ]Verbal  [ ]Non-Verbal [ ]Cachexia    BEHAVIORAL/PSYCH:  [x ]Alert and Oriented x2-3 [ ] Anxiety [ ] Delirium [ ] Agitation [x ] Calm   EYES: [ x] No scleral icterus [ ] Scleral icterus [ ] Closed  ENMT:  [ ]Dry mouth  [ ]No external oral lesions [ ] No external ear or nose lesions  CARDIOVASCULAR:  [ ]Regular [ ]Irregular [ ]Tachy [ ]Not Tachy  [ ]Cruz [ ] Edema [ x] No edema  PULMONARY:  [ ]Tachypnea  [ ]Audible excessive secretions [x ] No labored breathing [ ] labored breathing  GASTROINTESTINAL: [ ]Soft  [ ]Distended  [ ]Not distended [ ]Non tender [ ]Tender  MUSCULOSKELETAL: [ ]No clubbing [ ] clubbing  [x ] No cyanosis [ ] cyanosis  NEUROLOGIC: [ ]No focal deficits  [x ]Follows commands  [ ]Does not follow commands  [ ]Cognitive impairment  [ ]Dysphagia  [ ]Dysarthria  [ ]Paresis   SKIN: [ ] Jaundiced [ x] Non-jaundiced [ ]Rash [ ]No Rash [ ] Warm [ ] Dry  MISC/LINES: [ ] ET tube [ ] Trach [ ]NGT/OGT [ ]PEG [ ]Amaya    LABS: reviewed by me                        13.2   19.34 )-----------( 173      ( 29 Dec 2023 14:34 )             38.9   12-29    142  |  105  |  42<H>  ----------------------------<  142<H>  4.3   |  23  |  1.4    Ca    9.2      29 Dec 2023 14:34  Mg     1.8     12-29    TPro  6.4  /  Alb  3.6  /  TBili  0.4  /  DBili  x   /  AST  91<H>  /  ALT  18  /  AlkPhos  72  12-29  PT/INR - ( 29 Dec 2023 14:34 )   PT: 11.70 sec;   INR: 1.03 ratio         PTT - ( 29 Dec 2023 14:34 )  PTT:29.1 sec    Urinalysis Basic - ( 29 Dec 2023 14:34 )    Color: x / Appearance: x / SG: x / pH: x  Gluc: 142 mg/dL / Ketone: x  / Bili: x / Urobili: x   Blood: x / Protein: x / Nitrite: x   Leuk Esterase: x / RBC: x / WBC x   Sq Epi: x / Non Sq Epi: x / Bacteria: x      RADIOLOGY & ADDITIONAL STUDIES: reviewed by me    < from: Xray Chest 1 View-PORTABLE IMMEDIATE (Xray Chest 1 View-PORTABLE IMMEDIATE .) (12.28.23 @ 21:47) >  Impression:    Worsening opacifications.    < end of copied text >      EKG: reviewed by me    < from: 12 Lead ECG (12.28.23 @ 20:49) >  Ventricular Rate 110 BPM    Atrial Rate 110 BPM    P-R Interval 146 ms    QRS Duration 82 ms    Q-T Interval 340 ms    QTC Calculation(Bazett) 460 ms    P Axis 67 degrees    R Axis 97 degrees    T Axis 30 degrees    Diagnosis Line Sinus tachycardiawith Premature supraventricular complexes  Rightward axis  Anteroseptal infarct , age undetermined  Abnormal ECG    < end of copied text >      PROTEIN CALORIE MALNUTRITION PRESENT: [ ]mild [ ]moderate [ ]severe [ ]underweight [ ]morbid obesity  https://www.andeal.org/vault/2440/web/files/ONC/Table_Clinical%20Characteristics%20to%20Document%20Malnutrition-White%20JV%20et%20al%994200.pdf      Weight (kg): 74.8 (12-28-23 @ 05:10)  [ ]PPSV2 < or = to 30% [ ]significant weight loss  [ ]poor nutritional intake  [ ]anasarca      [ ]Artificial Nutrition      Palliative Care Spiritual/Emotional Screening Tool Question  Severity (0-4):                    OR                    [ x] Unable to determine. Will assess at later time if appropriate.  Score of 2 or greater indicates recommendation of Chaplaincy and/or SW referral  Chaplaincy Referral: [ ] Yes [ ] Refused [ ] Following     Caregiver [place]:  [ ] Yes [ ] No    OR    [x ] Unable to determine. Will assess at later time if appropriate.  Social Work Referral [ ]  Patient and Family Centered Care Referral [ ]    Anticipatory Grief Present: [ ] Yes [ ] No    OR     [ x] Unable to determine. Will assess at later time if appropriate.  Social Work Referral [ ]  Patient and Family Centered Care Referral [ ]    Patient discussed with primary medical team MD  Palliative care education provided to patient and/or family

## 2023-12-30 LAB
ALBUMIN SERPL ELPH-MCNC: 3.2 G/DL — LOW (ref 3.5–5.2)
ALBUMIN SERPL ELPH-MCNC: 3.2 G/DL — LOW (ref 3.5–5.2)
ALBUMIN SERPL ELPH-MCNC: 3.3 G/DL — LOW (ref 3.5–5.2)
ALBUMIN SERPL ELPH-MCNC: 3.3 G/DL — LOW (ref 3.5–5.2)
ALP SERPL-CCNC: 53 U/L — SIGNIFICANT CHANGE UP (ref 30–115)
ALP SERPL-CCNC: 53 U/L — SIGNIFICANT CHANGE UP (ref 30–115)
ALP SERPL-CCNC: 57 U/L — SIGNIFICANT CHANGE UP (ref 30–115)
ALP SERPL-CCNC: 57 U/L — SIGNIFICANT CHANGE UP (ref 30–115)
ALT FLD-CCNC: 15 U/L — SIGNIFICANT CHANGE UP (ref 0–41)
ANION GAP SERPL CALC-SCNC: 14 MMOL/L — SIGNIFICANT CHANGE UP (ref 7–14)
AST SERPL-CCNC: 51 U/L — HIGH (ref 0–41)
AST SERPL-CCNC: 51 U/L — HIGH (ref 0–41)
AST SERPL-CCNC: 60 U/L — HIGH (ref 0–41)
AST SERPL-CCNC: 60 U/L — HIGH (ref 0–41)
BASOPHILS # BLD AUTO: 0.01 K/UL — SIGNIFICANT CHANGE UP (ref 0–0.2)
BASOPHILS # BLD AUTO: 0.01 K/UL — SIGNIFICANT CHANGE UP (ref 0–0.2)
BASOPHILS # BLD AUTO: 0.02 K/UL — SIGNIFICANT CHANGE UP (ref 0–0.2)
BASOPHILS # BLD AUTO: 0.02 K/UL — SIGNIFICANT CHANGE UP (ref 0–0.2)
BASOPHILS NFR BLD AUTO: 0.1 % — SIGNIFICANT CHANGE UP (ref 0–1)
BASOPHILS NFR BLD AUTO: 0.1 % — SIGNIFICANT CHANGE UP (ref 0–1)
BASOPHILS NFR BLD AUTO: 0.2 % — SIGNIFICANT CHANGE UP (ref 0–1)
BASOPHILS NFR BLD AUTO: 0.2 % — SIGNIFICANT CHANGE UP (ref 0–1)
BILIRUB SERPL-MCNC: 0.6 MG/DL — SIGNIFICANT CHANGE UP (ref 0.2–1.2)
BUN SERPL-MCNC: 50 MG/DL — HIGH (ref 10–20)
BUN SERPL-MCNC: 50 MG/DL — HIGH (ref 10–20)
BUN SERPL-MCNC: 53 MG/DL — HIGH (ref 10–20)
BUN SERPL-MCNC: 53 MG/DL — HIGH (ref 10–20)
CALCIUM SERPL-MCNC: 8.7 MG/DL — SIGNIFICANT CHANGE UP (ref 8.4–10.5)
CALCIUM SERPL-MCNC: 8.7 MG/DL — SIGNIFICANT CHANGE UP (ref 8.4–10.5)
CALCIUM SERPL-MCNC: 8.8 MG/DL — SIGNIFICANT CHANGE UP (ref 8.4–10.4)
CALCIUM SERPL-MCNC: 8.8 MG/DL — SIGNIFICANT CHANGE UP (ref 8.4–10.4)
CHLORIDE SERPL-SCNC: 105 MMOL/L — SIGNIFICANT CHANGE UP (ref 98–110)
CO2 SERPL-SCNC: 24 MMOL/L — SIGNIFICANT CHANGE UP (ref 17–32)
CREAT SERPL-MCNC: 1.2 MG/DL — SIGNIFICANT CHANGE UP (ref 0.7–1.5)
CREAT SERPL-MCNC: 1.2 MG/DL — SIGNIFICANT CHANGE UP (ref 0.7–1.5)
CREAT SERPL-MCNC: 1.4 MG/DL — SIGNIFICANT CHANGE UP (ref 0.7–1.5)
CREAT SERPL-MCNC: 1.4 MG/DL — SIGNIFICANT CHANGE UP (ref 0.7–1.5)
EGFR: 34 ML/MIN/1.73M2 — LOW
EGFR: 34 ML/MIN/1.73M2 — LOW
EGFR: 40 ML/MIN/1.73M2 — LOW
EGFR: 40 ML/MIN/1.73M2 — LOW
EOSINOPHIL # BLD AUTO: 0 K/UL — SIGNIFICANT CHANGE UP (ref 0–0.7)
EOSINOPHIL NFR BLD AUTO: 0 % — SIGNIFICANT CHANGE UP (ref 0–8)
GAS PNL BLDA: SIGNIFICANT CHANGE UP
GAS PNL BLDA: SIGNIFICANT CHANGE UP
GLUCOSE BLDC GLUCOMTR-MCNC: 102 MG/DL — HIGH (ref 70–99)
GLUCOSE BLDC GLUCOMTR-MCNC: 102 MG/DL — HIGH (ref 70–99)
GLUCOSE BLDC GLUCOMTR-MCNC: 83 MG/DL — SIGNIFICANT CHANGE UP (ref 70–99)
GLUCOSE BLDC GLUCOMTR-MCNC: 83 MG/DL — SIGNIFICANT CHANGE UP (ref 70–99)
GLUCOSE SERPL-MCNC: 101 MG/DL — HIGH (ref 70–99)
GLUCOSE SERPL-MCNC: 101 MG/DL — HIGH (ref 70–99)
GLUCOSE SERPL-MCNC: 88 MG/DL — SIGNIFICANT CHANGE UP (ref 70–99)
GLUCOSE SERPL-MCNC: 88 MG/DL — SIGNIFICANT CHANGE UP (ref 70–99)
HCT VFR BLD CALC: 34.9 % — LOW (ref 37–47)
HCT VFR BLD CALC: 34.9 % — LOW (ref 37–47)
HCT VFR BLD CALC: 35.7 % — LOW (ref 37–47)
HCT VFR BLD CALC: 35.7 % — LOW (ref 37–47)
HGB BLD-MCNC: 11.6 G/DL — LOW (ref 12–16)
HGB BLD-MCNC: 11.6 G/DL — LOW (ref 12–16)
HGB BLD-MCNC: 12 G/DL — SIGNIFICANT CHANGE UP (ref 12–16)
HGB BLD-MCNC: 12 G/DL — SIGNIFICANT CHANGE UP (ref 12–16)
IMM GRANULOCYTES NFR BLD AUTO: 0.5 % — HIGH (ref 0.1–0.3)
IMM GRANULOCYTES NFR BLD AUTO: 0.5 % — HIGH (ref 0.1–0.3)
IMM GRANULOCYTES NFR BLD AUTO: 0.6 % — HIGH (ref 0.1–0.3)
IMM GRANULOCYTES NFR BLD AUTO: 0.6 % — HIGH (ref 0.1–0.3)
LYMPHOCYTES # BLD AUTO: 0.5 K/UL — LOW (ref 1.2–3.4)
LYMPHOCYTES # BLD AUTO: 0.5 K/UL — LOW (ref 1.2–3.4)
LYMPHOCYTES # BLD AUTO: 1.35 K/UL — SIGNIFICANT CHANGE UP (ref 1.2–3.4)
LYMPHOCYTES # BLD AUTO: 1.35 K/UL — SIGNIFICANT CHANGE UP (ref 1.2–3.4)
LYMPHOCYTES # BLD AUTO: 11.6 % — LOW (ref 20.5–51.1)
LYMPHOCYTES # BLD AUTO: 11.6 % — LOW (ref 20.5–51.1)
LYMPHOCYTES # BLD AUTO: 4.7 % — LOW (ref 20.5–51.1)
LYMPHOCYTES # BLD AUTO: 4.7 % — LOW (ref 20.5–51.1)
MAGNESIUM SERPL-MCNC: 1.8 MG/DL — SIGNIFICANT CHANGE UP (ref 1.8–2.4)
MAGNESIUM SERPL-MCNC: 1.8 MG/DL — SIGNIFICANT CHANGE UP (ref 1.8–2.4)
MAGNESIUM SERPL-MCNC: 1.9 MG/DL — SIGNIFICANT CHANGE UP (ref 1.8–2.4)
MAGNESIUM SERPL-MCNC: 1.9 MG/DL — SIGNIFICANT CHANGE UP (ref 1.8–2.4)
MCHC RBC-ENTMCNC: 30.8 PG — SIGNIFICANT CHANGE UP (ref 27–31)
MCHC RBC-ENTMCNC: 30.8 PG — SIGNIFICANT CHANGE UP (ref 27–31)
MCHC RBC-ENTMCNC: 30.9 PG — SIGNIFICANT CHANGE UP (ref 27–31)
MCHC RBC-ENTMCNC: 30.9 PG — SIGNIFICANT CHANGE UP (ref 27–31)
MCHC RBC-ENTMCNC: 33.2 G/DL — SIGNIFICANT CHANGE UP (ref 32–37)
MCHC RBC-ENTMCNC: 33.2 G/DL — SIGNIFICANT CHANGE UP (ref 32–37)
MCHC RBC-ENTMCNC: 33.6 G/DL — SIGNIFICANT CHANGE UP (ref 32–37)
MCHC RBC-ENTMCNC: 33.6 G/DL — SIGNIFICANT CHANGE UP (ref 32–37)
MCV RBC AUTO: 91.5 FL — SIGNIFICANT CHANGE UP (ref 81–99)
MCV RBC AUTO: 91.5 FL — SIGNIFICANT CHANGE UP (ref 81–99)
MCV RBC AUTO: 93.1 FL — SIGNIFICANT CHANGE UP (ref 81–99)
MCV RBC AUTO: 93.1 FL — SIGNIFICANT CHANGE UP (ref 81–99)
MONOCYTES # BLD AUTO: 0.37 K/UL — SIGNIFICANT CHANGE UP (ref 0.1–0.6)
MONOCYTES # BLD AUTO: 0.37 K/UL — SIGNIFICANT CHANGE UP (ref 0.1–0.6)
MONOCYTES # BLD AUTO: 0.79 K/UL — HIGH (ref 0.1–0.6)
MONOCYTES # BLD AUTO: 0.79 K/UL — HIGH (ref 0.1–0.6)
MONOCYTES NFR BLD AUTO: 3.5 % — SIGNIFICANT CHANGE UP (ref 1.7–9.3)
MONOCYTES NFR BLD AUTO: 3.5 % — SIGNIFICANT CHANGE UP (ref 1.7–9.3)
MONOCYTES NFR BLD AUTO: 6.8 % — SIGNIFICANT CHANGE UP (ref 1.7–9.3)
MONOCYTES NFR BLD AUTO: 6.8 % — SIGNIFICANT CHANGE UP (ref 1.7–9.3)
NEUTROPHILS # BLD AUTO: 9.43 K/UL — HIGH (ref 1.4–6.5)
NEUTROPHILS # BLD AUTO: 9.43 K/UL — HIGH (ref 1.4–6.5)
NEUTROPHILS # BLD AUTO: 9.63 K/UL — HIGH (ref 1.4–6.5)
NEUTROPHILS # BLD AUTO: 9.63 K/UL — HIGH (ref 1.4–6.5)
NEUTROPHILS NFR BLD AUTO: 80.9 % — HIGH (ref 42.2–75.2)
NEUTROPHILS NFR BLD AUTO: 80.9 % — HIGH (ref 42.2–75.2)
NEUTROPHILS NFR BLD AUTO: 91.1 % — HIGH (ref 42.2–75.2)
NEUTROPHILS NFR BLD AUTO: 91.1 % — HIGH (ref 42.2–75.2)
NRBC # BLD: 0 /100 WBCS — SIGNIFICANT CHANGE UP (ref 0–0)
PLATELET # BLD AUTO: 133 K/UL — SIGNIFICANT CHANGE UP (ref 130–400)
PLATELET # BLD AUTO: 133 K/UL — SIGNIFICANT CHANGE UP (ref 130–400)
PLATELET # BLD AUTO: 134 K/UL — SIGNIFICANT CHANGE UP (ref 130–400)
PLATELET # BLD AUTO: 134 K/UL — SIGNIFICANT CHANGE UP (ref 130–400)
PMV BLD: 12.4 FL — HIGH (ref 7.4–10.4)
POTASSIUM SERPL-MCNC: 3.9 MMOL/L — SIGNIFICANT CHANGE UP (ref 3.5–5)
POTASSIUM SERPL-MCNC: 3.9 MMOL/L — SIGNIFICANT CHANGE UP (ref 3.5–5)
POTASSIUM SERPL-MCNC: 4 MMOL/L — SIGNIFICANT CHANGE UP (ref 3.5–5)
POTASSIUM SERPL-MCNC: 4 MMOL/L — SIGNIFICANT CHANGE UP (ref 3.5–5)
POTASSIUM SERPL-SCNC: 3.9 MMOL/L — SIGNIFICANT CHANGE UP (ref 3.5–5)
POTASSIUM SERPL-SCNC: 3.9 MMOL/L — SIGNIFICANT CHANGE UP (ref 3.5–5)
POTASSIUM SERPL-SCNC: 4 MMOL/L — SIGNIFICANT CHANGE UP (ref 3.5–5)
POTASSIUM SERPL-SCNC: 4 MMOL/L — SIGNIFICANT CHANGE UP (ref 3.5–5)
PROT SERPL-MCNC: 5.7 G/DL — LOW (ref 6–8)
PROT SERPL-MCNC: 5.7 G/DL — LOW (ref 6–8)
PROT SERPL-MCNC: 5.9 G/DL — LOW (ref 6–8)
PROT SERPL-MCNC: 5.9 G/DL — LOW (ref 6–8)
RBC # BLD: 3.75 M/UL — LOW (ref 4.2–5.4)
RBC # BLD: 3.75 M/UL — LOW (ref 4.2–5.4)
RBC # BLD: 3.9 M/UL — LOW (ref 4.2–5.4)
RBC # BLD: 3.9 M/UL — LOW (ref 4.2–5.4)
RBC # FLD: 12.5 % — SIGNIFICANT CHANGE UP (ref 11.5–14.5)
SODIUM SERPL-SCNC: 143 MMOL/L — SIGNIFICANT CHANGE UP (ref 135–146)
TROPONIN SAMPLING TIME: SIGNIFICANT CHANGE UP
TROPONIN SAMPLING TIME: SIGNIFICANT CHANGE UP
TROPONIN T, HIGH SENSITIVITY RESULT: 1623 NG/L — CRITICAL HIGH (ref 6–13)
TROPONIN T, HIGH SENSITIVITY RESULT: 1623 NG/L — CRITICAL HIGH (ref 6–13)
WBC # BLD: 10.57 K/UL — SIGNIFICANT CHANGE UP (ref 4.8–10.8)
WBC # BLD: 10.57 K/UL — SIGNIFICANT CHANGE UP (ref 4.8–10.8)
WBC # BLD: 11.65 K/UL — HIGH (ref 4.8–10.8)
WBC # BLD: 11.65 K/UL — HIGH (ref 4.8–10.8)
WBC # FLD AUTO: 10.57 K/UL — SIGNIFICANT CHANGE UP (ref 4.8–10.8)
WBC # FLD AUTO: 10.57 K/UL — SIGNIFICANT CHANGE UP (ref 4.8–10.8)
WBC # FLD AUTO: 11.65 K/UL — HIGH (ref 4.8–10.8)
WBC # FLD AUTO: 11.65 K/UL — HIGH (ref 4.8–10.8)

## 2023-12-30 PROCEDURE — 93306 TTE W/DOPPLER COMPLETE: CPT | Mod: 26

## 2023-12-30 PROCEDURE — 99233 SBSQ HOSP IP/OBS HIGH 50: CPT

## 2023-12-30 PROCEDURE — 93970 EXTREMITY STUDY: CPT | Mod: 26

## 2023-12-30 PROCEDURE — 71045 X-RAY EXAM CHEST 1 VIEW: CPT | Mod: 26

## 2023-12-30 RX ORDER — ACETAMINOPHEN 500 MG
650 TABLET ORAL EVERY 6 HOURS
Refills: 0 | Status: DISCONTINUED | OUTPATIENT
Start: 2023-12-30 | End: 2024-01-09

## 2023-12-30 RX ADMIN — PIPERACILLIN AND TAZOBACTAM 25 GRAM(S): 4; .5 INJECTION, POWDER, LYOPHILIZED, FOR SOLUTION INTRAVENOUS at 18:28

## 2023-12-30 RX ADMIN — Medication 30 MILLIGRAM(S): at 18:27

## 2023-12-30 RX ADMIN — Medication 650 MILLIGRAM(S): at 12:57

## 2023-12-30 RX ADMIN — ATORVASTATIN CALCIUM 40 MILLIGRAM(S): 80 TABLET, FILM COATED ORAL at 21:52

## 2023-12-30 RX ADMIN — Medication 20 MILLIGRAM(S): at 05:51

## 2023-12-30 RX ADMIN — Medication 3 MILLILITER(S): at 13:54

## 2023-12-30 RX ADMIN — Medication 650 MILLIGRAM(S): at 13:27

## 2023-12-30 RX ADMIN — Medication 40 MILLIGRAM(S): at 12:56

## 2023-12-30 RX ADMIN — Medication 3 MILLILITER(S): at 08:06

## 2023-12-30 RX ADMIN — ENOXAPARIN SODIUM 75 MILLIGRAM(S): 100 INJECTION SUBCUTANEOUS at 05:51

## 2023-12-30 RX ADMIN — Medication 3 MILLILITER(S): at 21:08

## 2023-12-30 RX ADMIN — Medication 3 MILLILITER(S): at 01:10

## 2023-12-30 RX ADMIN — Medication 30 MILLIGRAM(S): at 05:51

## 2023-12-30 RX ADMIN — CINACALCET 30 MILLIGRAM(S): 30 TABLET, FILM COATED ORAL at 12:57

## 2023-12-30 RX ADMIN — PIPERACILLIN AND TAZOBACTAM 25 GRAM(S): 4; .5 INJECTION, POWDER, LYOPHILIZED, FOR SOLUTION INTRAVENOUS at 06:10

## 2023-12-30 RX ADMIN — Medication 81 MILLIGRAM(S): at 12:56

## 2023-12-30 NOTE — PROGRESS NOTE ADULT - ASSESSMENT
89-year-old female PMH HTN, ckd, op, hypothyroidism, hyperparathyroidism presenting with fever, URI symptoms, cough, and shortness of breath x 2 days.  Worsened overnight, lives with her sons who states that she was calling them throughout the night complaining that she felt like she could not breathe.  Febrile and hypoxic to 89% on room air on arrival, improved on 2 L nasal cannula.  Denies any headache, sore throat, CP, nausea vomiting diarrhea, abdominal pain, flank pain, urinary symptoms, rash.  Sons endorse recent sick contacts, family members with URI symptoms who she was with over the holidays.    # Acute hypoxemic respiratory failure 2/2 influenza pneumonia with possible  Acute CHF exacerbation   # aspiration in evening   # Sepsis present on admission  2/2 viral pneumonia   #elevated dimer  #lactemia- resolved  - Chest X-ray: Bilateral interstitial opacities and likely biapical thickening, new since distant prior   - CXr worsening infiltrates  - Pt now on bIPAP  - BNP elevated, JVD on exam   - c/w 5 day of tamiflu   - IV lasix, monitor UOP   - dw CC, consutled CC, upgrade to SDU   - DC unasyn, started zosyn   - check duplex, if neg will do CTA chest today   -  lovenox 75 mg daily, adjusted for crcl, CrCL 25  -Discussed benefits and risks of starting anticoagulation including risk of excessively bleeding gums or nose bleeds, hematuria,  hemorrhagic stroke, GI bleed,  excessive bleeding after trauma or cuts and even death. Advised seek medical intervention immediately. Pt son agreed decided to start anticoagulation given benefits outweighs risk.  - follow up lactate-->1.2 now   - wean to HFNC     #NSTEMI vs ischemic demand   -ekg new TWI   - possible also from demand  - lovenox 75 mg daily for now  - asa 181 mg, lipiotor  -hold Beta-Blocker therapy as per cardiology       # Normocytic anemia   - monitor     # HTN   - Hold amlodipine and Losartan while on diuresis     # CKD stage 3a   - Baseline creatinine: 1.2 , 1.4 now   - Avoid nephrotoxic agents    #Progress Note Handoff  Pending (specify):  as above  Family discussion: house staff updated pt family  Disposition: SDU   Decision to admit the pt is based on acuity as above   Overall very poor prognosis  89-year-old female PMH HTN, ckd, op, hypothyroidism, hyperparathyroidism presenting with fever, URI symptoms, cough, and shortness of breath x 2 days.  Worsened overnight, lives with her sons who states that she was calling them throughout the night complaining that she felt like she could not breathe.  Febrile and hypoxic to 89% on room air on arrival, improved on 2 L nasal cannula.  Denies any headache, sore throat, CP, nausea vomiting diarrhea, abdominal pain, flank pain, urinary symptoms, rash.  Sons endorse recent sick contacts, family members with URI symptoms who she was with over the holidays.    # Acute hypoxemic respiratory failure 2/2 influenza pneumonia with possible  Acute CHF exacerbation   # aspiration in evening   # Sepsis present on admission  2/2 viral pneumonia   #elevated dimer  #lactemia- resolved  - Chest X-ray: Bilateral interstitial opacities and likely biapical thickening, new since distant prior   - CXr worsening infiltrates  - Pt now on bIPAP  - BNP elevated, JVD on exam   - c/w 5 day of tamiflu   - IV lasix, monitor UOP   - dw CC, consutled CC, upgrade to SDU   - DC unasyn, started zosyn   - check duplex, if neg will do VQ scan as crcl <30  -  lovenox 75 mg daily, adjusted for crcl, CrCL 25  -Discussed benefits and risks of starting anticoagulation including risk of excessively bleeding gums or nose bleeds, hematuria,  hemorrhagic stroke, GI bleed,  excessive bleeding after trauma or cuts and even death. Advised seek medical intervention immediately. Pt son agreed decided to start anticoagulation given benefits outweighs risk.  - follow up lactate-->1.2 now   - wean to HFNC     #NSTEMI vs ischemic demand   -ekg new TWI   - possible also from demand  - lovenox 75 mg daily for now  - asa 181 mg, lipiotor  -hold Beta-Blocker therapy as per cardiology       # Normocytic anemia   - monitor     # HTN   - Hold amlodipine and Losartan while on diuresis     # CKD stage 3a   - Baseline creatinine: 1.2 , 1.4 now   - Avoid nephrotoxic agents    #Progress Note Handoff  Pending (specify):  as above  Family discussion: house staff updated pt family  Disposition: SDU   Decision to admit the pt is based on acuity as above   Overall very poor prognosis

## 2023-12-30 NOTE — PROGRESS NOTE ADULT - ASSESSMENT
Impression:    Acute hypoxic respiratory failure  Influenza A  Possible aspiration pneumonia  NSTEMI type 2       PLAN:    CNS: Avoid CNS depressants    HEENT: Oral care    PULMONARY:  HOB @ 45 degrees.  HFNC,/ niv wean as tolerated. Keep Sao2 92 to 96%    CARDIOVASCULAR: Check an echo. Maintain an even fluid balance    GI: GI prophylaxis.  Feeding.  Bowel regimen     RENAL:  Follow up lytes.  Correct as needed    INFECTIOUS DISEASE: Follow up cultures. Tamiflu, zosyn, procal, swab for MRSA    HEMATOLOGICAL:  DVT prophylaxis. Check LE dupplex.     ENDOCRINE:  Follow up FS.  Insulin protocol if needed.    MUSCULOSKELETAL: OOBTC    GOC/ poor prognosis    SDU

## 2023-12-30 NOTE — PROGRESS NOTE ADULT - SUBJECTIVE AND OBJECTIVE BOX
Patient is a 89y old  Female who presents with a chief complaint of shortness of breath (12-30-23)      Pt seen and examined at bedside. No CP or SOB.      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    H/O total knee replacement, bilateral    H/O carpal tunnel repair  B/L        VITAL SIGNS (Last 24 hrs):  T(C): 36.4 (12-30-23 @ 08:00), Max: 37.6 (12-29-23 @ 14:40)  HR: 99 (12-30-23 @ 12:10) (88 - 115)  BP: 146/70 (12-30-23 @ 08:00) (134/61 - 153/69)  RR: 31 (12-30-23 @ 12:10) (20 - 31)  SpO2: 96% (12-30-23 @ 12:10) (95% - 99%)  Wt(kg): --  Daily     Daily     I&O's Summary    29 Dec 2023 07:01  -  30 Dec 2023 07:00  --------------------------------------------------------  IN: 0 mL / OUT: 800 mL / NET: -800 mL    30 Dec 2023 07:01  -  30 Dec 2023 12:30  --------------------------------------------------------  IN: 0 mL / OUT: 200 mL / NET: -200 mL        PHYSICAL EXAM:  GENERAL: NAD, well-developed  HEAD:  Atraumatic, Normocephalic  EYES: EOMI, PERRLA, conjunctiva and sclera clear  NECK: Supple, No JVD  CHEST/LUNG: Clear to auscultation bilaterally; No wheeze  HEART: Regular rate and rhythm; No murmurs, rubs, or gallops  ABDOMEN: Soft, Nontender, Nondistended; Bowel sounds present  EXTREMITIES:  2+ Peripheral Pulses, No clubbing, cyanosis, or edema  PSYCH: AAOx3  NEUROLOGY: non-focal  SKIN: No rashes or lesions    Labs Reviewed  Spoke to patient in regards to abnormal labs.    CBC Full  -  ( 30 Dec 2023 11:17 )  WBC Count : 11.65 K/uL  Hemoglobin : 12.0 g/dL  Hematocrit : 35.7 %  Platelet Count - Automated : 133 K/uL  Mean Cell Volume : 91.5 fL  Mean Cell Hemoglobin : 30.8 pg  Mean Cell Hemoglobin Concentration : 33.6 g/dL  Auto Neutrophil # : 9.43 K/uL  Auto Lymphocyte # : 1.35 K/uL  Auto Monocyte # : 0.79 K/uL  Auto Eosinophil # : 0.00 K/uL  Auto Basophil # : 0.02 K/uL  Auto Neutrophil % : 80.9 %  Auto Lymphocyte % : 11.6 %  Auto Monocyte % : 6.8 %  Auto Eosinophil % : 0.0 %  Auto Basophil % : 0.2 %    BMP:    12-30 @ 11:17    Blood Urea Nitrogen - 50  Calcium - 8.7  Carbond Dioxide - 24  Chloride - 105  Creatinine - 1.4  Glucose - 88  Potassium - 3.9  Sodium - 143      Hemoglobin A1c -   PT/INR - ( 29 Dec 2023 14:34 )   PT: 11.70 sec;   INR: 1.03 ratio         PTT - ( 29 Dec 2023 14:34 )  PTT:29.1 sec  Urine Culture:  12-28 @ 06:14 Urine culture: --    Culture Results:   No growth at 24 hours  Method Type: --  Organism: --  Organism Identification: --  Specimen Source: .Blood Blood-Peripheral        COVID Labs  CRP:    Procalcitonin, Serum: 0.18 ng/mL (12-28-23 @ 17:09)    D-Dimer:  1177 ng/mL DDU (12-28-23 @ 17:09)    Ferritin: 283 ng/mL (12-29-23 @ 06:45)          Imaging reviewed independently and reviewed read        MEDICATIONS  (STANDING):  albuterol/ipratropium for Nebulization 3 milliLiter(s) Nebulizer every 4 hours  aspirin enteric coated 81 milliGRAM(s) Oral daily  atorvastatin 40 milliGRAM(s) Oral at bedtime  cinacalcet 30 milliGRAM(s) Oral daily  enoxaparin Injectable 75 milliGRAM(s) SubCutaneous every 24 hours  methylPREDNISolone sodium succinate Injectable 40 milliGRAM(s) IV Push every 24 hours  oseltamivir 30 milliGRAM(s) Oral two times a day  piperacillin/tazobactam IVPB.. 3.375 Gram(s) IV Intermittent every 12 hours  sodium chloride 3%  Inhalation 4 milliLiter(s) Inhalation every 12 hours    MEDICATIONS  (PRN):       Patient is a 89y old  Female who presents with a chief complaint of shortness of breath (12-30-23)      Pt seen and examined at bedside. No CP or SOB. 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    H/O total knee replacement, bilateral    H/O carpal tunnel repair  B/L        VITAL SIGNS (Last 24 hrs):  T(C): 36.4 (12-30-23 @ 08:00), Max: 37.6 (12-29-23 @ 14:40)  HR: 99 (12-30-23 @ 12:10) (88 - 115)  BP: 146/70 (12-30-23 @ 08:00) (134/61 - 153/69)  RR: 31 (12-30-23 @ 12:10) (20 - 31)  SpO2: 96% (12-30-23 @ 12:10) (95% - 99%)  Wt(kg): --  Daily     Daily     I&O's Summary    29 Dec 2023 07:01  -  30 Dec 2023 07:00  --------------------------------------------------------  IN: 0 mL / OUT: 800 mL / NET: -800 mL    30 Dec 2023 07:01  -  30 Dec 2023 12:30  --------------------------------------------------------  IN: 0 mL / OUT: 200 mL / NET: -200 mL        PHYSICAL EXAM:  GENERAL: NAD, well-developed  HEAD:  Atraumatic, Normocephalic  EYES: EOMI, PERRLA, conjunctiva and sclera clear  NECK: Supple, No JVD  CHEST/LUNG: Clear to auscultation bilaterally; No wheeze  HEART: Regular rate and rhythm; No murmurs, rubs, or gallops  ABDOMEN: Soft, Nontender, Nondistended; Bowel sounds present  EXTREMITIES:  2+ Peripheral Pulses, No clubbing, cyanosis, or edema  PSYCH: AAOx3  NEUROLOGY: non-focal  SKIN: No rashes or lesions    Labs Reviewed  Spoke to patient in regards to abnormal labs.    CBC Full  -  ( 30 Dec 2023 11:17 )  WBC Count : 11.65 K/uL  Hemoglobin : 12.0 g/dL  Hematocrit : 35.7 %  Platelet Count - Automated : 133 K/uL  Mean Cell Volume : 91.5 fL  Mean Cell Hemoglobin : 30.8 pg  Mean Cell Hemoglobin Concentration : 33.6 g/dL  Auto Neutrophil # : 9.43 K/uL  Auto Lymphocyte # : 1.35 K/uL  Auto Monocyte # : 0.79 K/uL  Auto Eosinophil # : 0.00 K/uL  Auto Basophil # : 0.02 K/uL  Auto Neutrophil % : 80.9 %  Auto Lymphocyte % : 11.6 %  Auto Monocyte % : 6.8 %  Auto Eosinophil % : 0.0 %  Auto Basophil % : 0.2 %    BMP:    12-30 @ 11:17    Blood Urea Nitrogen - 50  Calcium - 8.7  Carbond Dioxide - 24  Chloride - 105  Creatinine - 1.4  Glucose - 88  Potassium - 3.9  Sodium - 143      Hemoglobin A1c -   PT/INR - ( 29 Dec 2023 14:34 )   PT: 11.70 sec;   INR: 1.03 ratio         PTT - ( 29 Dec 2023 14:34 )  PTT:29.1 sec  Urine Culture:  12-28 @ 06:14 Urine culture: --    Culture Results:   No growth at 24 hours  Method Type: --  Organism: --  Organism Identification: --  Specimen Source: .Blood Blood-Peripheral        COVID Labs  CRP:    Procalcitonin, Serum: 0.18 ng/mL (12-28-23 @ 17:09)    D-Dimer:  1177 ng/mL DDU (12-28-23 @ 17:09)    Ferritin: 283 ng/mL (12-29-23 @ 06:45)          Imaging reviewed independently and reviewed read    < from: Xray Chest 1 View- PORTABLE-Routine (Xray Chest 1 View- PORTABLE-Routine in AM.) (12.30.23 @ 08:16) >  Impression:    Bilateral opacities, unchanged.    < end of copied text >      MEDICATIONS  (STANDING):  albuterol/ipratropium for Nebulization 3 milliLiter(s) Nebulizer every 4 hours  aspirin enteric coated 81 milliGRAM(s) Oral daily  atorvastatin 40 milliGRAM(s) Oral at bedtime  cinacalcet 30 milliGRAM(s) Oral daily  enoxaparin Injectable 75 milliGRAM(s) SubCutaneous every 24 hours  methylPREDNISolone sodium succinate Injectable 40 milliGRAM(s) IV Push every 24 hours  oseltamivir 30 milliGRAM(s) Oral two times a day  piperacillin/tazobactam IVPB.. 3.375 Gram(s) IV Intermittent every 12 hours  sodium chloride 3%  Inhalation 4 milliLiter(s) Inhalation every 12 hours    MEDICATIONS  (PRN):

## 2023-12-30 NOTE — CHART NOTE - NSCHARTNOTEFT_GEN_A_CORE
Was called for patient endorsing increasing  and hypoxia on HFNC. Was put back on BIPAP. Increased EPAP to 16 and IPAP to 8. Patient feeling better on increased settings. Ordered stat x-ray chest. Please follow up with chest x-ray and ABG in a hour @ 8 PM. Lasix discontinued. Was called for patient endorsing increasing  and hypoxia on HFNC. Was put back on BIPAP. Increased EPAP to 16 and IPAP to 8. Patient feeling better on increased settings. Ordered stat x-ray chest. Please follow up with chest x-ray and ABG in a hour @ 8 AM. Lasix discontinued.

## 2023-12-30 NOTE — PROGRESS NOTE ADULT - SUBJECTIVE AND OBJECTIVE BOX
Over Night Events: events noted, NIV overnight, afebrile, palliative noted      PHYSICAL EXAM    ICU Vital Signs Last 24 Hrs  T(C): 37.1 (30 Dec 2023 04:00), Max: 37.6 (29 Dec 2023 14:40)  T(F): 98.7 (30 Dec 2023 04:00), Max: 99.7 (29 Dec 2023 14:40)  HR: 89 (30 Dec 2023 04:00) (89 - 115)  BP: 137/87 (30 Dec 2023 04:00) (131/63 - 153/69)  BP(mean): 104 (30 Dec 2023 04:00) (104 - 104)  RR: 26 (30 Dec 2023 04:00) (20 - 26)  SpO2: 99% (30 Dec 2023 04:00) (95% - 99%)    O2 Parameters below as of 30 Dec 2023 04:00  Patient On (Oxygen Delivery Method): BiPAP/CPAP            General: ill looking  Lungs: Bilateral rhonchi  Cardiovascular: CHRISS 2.6  Abdomen: Soft, Positive BS  Extremities: No clubbing   Neurological: Non focal       12-29-23 @ 07:01  -  12-30-23 @ 07:00  --------------------------------------------------------  IN:  Total IN: 0 mL    OUT:    Voided (mL): 800 mL  Total OUT: 800 mL    Total NET: -800 mL          LABS:                          13.2   19.34 )-----------( 173      ( 29 Dec 2023 14:34 )             38.9                                               12-29    142  |  105  |  42<H>  ----------------------------<  142<H>  4.3   |  23  |  1.4    Ca    9.2      29 Dec 2023 14:34  Mg     1.8     12-29    TPro  6.4  /  Alb  3.6  /  TBili  0.4  /  DBili  x   /  AST  91<H>  /  ALT  18  /  AlkPhos  72  12-29      PT/INR - ( 29 Dec 2023 14:34 )   PT: 11.70 sec;   INR: 1.03 ratio         PTT - ( 29 Dec 2023 14:34 )  PTT:29.1 sec                                       Urinalysis Basic - ( 29 Dec 2023 14:34 )    Color: x / Appearance: x / SG: x / pH: x  Gluc: 142 mg/dL / Ketone: x  / Bili: x / Urobili: x   Blood: x / Protein: x / Nitrite: x   Leuk Esterase: x / RBC: x / WBC x   Sq Epi: x / Non Sq Epi: x / Bacteria: x                                                  LIVER FUNCTIONS - ( 29 Dec 2023 14:34 )  Alb: 3.6 g/dL / Pro: 6.4 g/dL / ALK PHOS: 72 U/L / ALT: 18 U/L / AST: 91 U/L / GGT: x                                                  Culture - Blood (collected 28 Dec 2023 06:14)  Source: .Blood Blood-Peripheral  Preliminary Report (29 Dec 2023 13:03):    No growth at 24 hours    Culture - Blood (collected 28 Dec 2023 06:14)  Source: .Blood Blood-Peripheral  Preliminary Report (29 Dec 2023 13:03):    No growth at 24 hours                                                                                       ABG - ( 29 Dec 2023 08:52 )  pH, Arterial: 7.46  pH, Blood: x     /  pCO2: 34    /  pO2: 59    / HCO3: 24    / Base Excess: 0.8   /  SaO2: x                   MEDICATIONS  (STANDING):  albuterol/ipratropium for Nebulization 3 milliLiter(s) Nebulizer every 4 hours  aspirin enteric coated 81 milliGRAM(s) Oral daily  atorvastatin 40 milliGRAM(s) Oral at bedtime  cinacalcet 30 milliGRAM(s) Oral daily  enoxaparin Injectable 75 milliGRAM(s) SubCutaneous every 24 hours  furosemide   Injectable 20 milliGRAM(s) IV Push daily  oseltamivir 30 milliGRAM(s) Oral two times a day  piperacillin/tazobactam IVPB.. 3.375 Gram(s) IV Intermittent every 12 hours  sodium chloride 3%  Inhalation 4 milliLiter(s) Inhalation every 12 hours    CXR noted

## 2023-12-31 LAB
ALBUMIN SERPL ELPH-MCNC: 3.2 G/DL — LOW (ref 3.5–5.2)
ALBUMIN SERPL ELPH-MCNC: 3.2 G/DL — LOW (ref 3.5–5.2)
ALP SERPL-CCNC: 52 U/L — SIGNIFICANT CHANGE UP (ref 30–115)
ALP SERPL-CCNC: 52 U/L — SIGNIFICANT CHANGE UP (ref 30–115)
ALT FLD-CCNC: 12 U/L — SIGNIFICANT CHANGE UP (ref 0–41)
ALT FLD-CCNC: 12 U/L — SIGNIFICANT CHANGE UP (ref 0–41)
ANION GAP SERPL CALC-SCNC: 13 MMOL/L — SIGNIFICANT CHANGE UP (ref 7–14)
ANION GAP SERPL CALC-SCNC: 13 MMOL/L — SIGNIFICANT CHANGE UP (ref 7–14)
AST SERPL-CCNC: 39 U/L — SIGNIFICANT CHANGE UP (ref 0–41)
AST SERPL-CCNC: 39 U/L — SIGNIFICANT CHANGE UP (ref 0–41)
BASOPHILS # BLD AUTO: 0.01 K/UL — SIGNIFICANT CHANGE UP (ref 0–0.2)
BASOPHILS # BLD AUTO: 0.01 K/UL — SIGNIFICANT CHANGE UP (ref 0–0.2)
BASOPHILS NFR BLD AUTO: 0.1 % — SIGNIFICANT CHANGE UP (ref 0–1)
BASOPHILS NFR BLD AUTO: 0.1 % — SIGNIFICANT CHANGE UP (ref 0–1)
BILIRUB SERPL-MCNC: 0.5 MG/DL — SIGNIFICANT CHANGE UP (ref 0.2–1.2)
BILIRUB SERPL-MCNC: 0.5 MG/DL — SIGNIFICANT CHANGE UP (ref 0.2–1.2)
BUN SERPL-MCNC: 53 MG/DL — HIGH (ref 10–20)
BUN SERPL-MCNC: 53 MG/DL — HIGH (ref 10–20)
CALCIUM SERPL-MCNC: 8.7 MG/DL — SIGNIFICANT CHANGE UP (ref 8.4–10.5)
CALCIUM SERPL-MCNC: 8.7 MG/DL — SIGNIFICANT CHANGE UP (ref 8.4–10.5)
CHLORIDE SERPL-SCNC: 107 MMOL/L — SIGNIFICANT CHANGE UP (ref 98–110)
CHLORIDE SERPL-SCNC: 107 MMOL/L — SIGNIFICANT CHANGE UP (ref 98–110)
CO2 SERPL-SCNC: 27 MMOL/L — SIGNIFICANT CHANGE UP (ref 17–32)
CO2 SERPL-SCNC: 27 MMOL/L — SIGNIFICANT CHANGE UP (ref 17–32)
CREAT SERPL-MCNC: 1.2 MG/DL — SIGNIFICANT CHANGE UP (ref 0.7–1.5)
CREAT SERPL-MCNC: 1.2 MG/DL — SIGNIFICANT CHANGE UP (ref 0.7–1.5)
EGFR: 40 ML/MIN/1.73M2 — LOW
EGFR: 40 ML/MIN/1.73M2 — LOW
EOSINOPHIL # BLD AUTO: 0 K/UL — SIGNIFICANT CHANGE UP (ref 0–0.7)
EOSINOPHIL # BLD AUTO: 0 K/UL — SIGNIFICANT CHANGE UP (ref 0–0.7)
EOSINOPHIL NFR BLD AUTO: 0 % — SIGNIFICANT CHANGE UP (ref 0–8)
EOSINOPHIL NFR BLD AUTO: 0 % — SIGNIFICANT CHANGE UP (ref 0–8)
GLUCOSE BLDC GLUCOMTR-MCNC: 102 MG/DL — HIGH (ref 70–99)
GLUCOSE BLDC GLUCOMTR-MCNC: 102 MG/DL — HIGH (ref 70–99)
GLUCOSE BLDC GLUCOMTR-MCNC: 153 MG/DL — HIGH (ref 70–99)
GLUCOSE BLDC GLUCOMTR-MCNC: 153 MG/DL — HIGH (ref 70–99)
GLUCOSE BLDC GLUCOMTR-MCNC: 97 MG/DL — SIGNIFICANT CHANGE UP (ref 70–99)
GLUCOSE BLDC GLUCOMTR-MCNC: 97 MG/DL — SIGNIFICANT CHANGE UP (ref 70–99)
GLUCOSE SERPL-MCNC: 96 MG/DL — SIGNIFICANT CHANGE UP (ref 70–99)
GLUCOSE SERPL-MCNC: 96 MG/DL — SIGNIFICANT CHANGE UP (ref 70–99)
HCT VFR BLD CALC: 32.9 % — LOW (ref 37–47)
HCT VFR BLD CALC: 32.9 % — LOW (ref 37–47)
HGB BLD-MCNC: 10.9 G/DL — LOW (ref 12–16)
HGB BLD-MCNC: 10.9 G/DL — LOW (ref 12–16)
IMM GRANULOCYTES NFR BLD AUTO: 0.3 % — SIGNIFICANT CHANGE UP (ref 0.1–0.3)
IMM GRANULOCYTES NFR BLD AUTO: 0.3 % — SIGNIFICANT CHANGE UP (ref 0.1–0.3)
LYMPHOCYTES # BLD AUTO: 1.25 K/UL — SIGNIFICANT CHANGE UP (ref 1.2–3.4)
LYMPHOCYTES # BLD AUTO: 1.25 K/UL — SIGNIFICANT CHANGE UP (ref 1.2–3.4)
LYMPHOCYTES # BLD AUTO: 13.8 % — LOW (ref 20.5–51.1)
LYMPHOCYTES # BLD AUTO: 13.8 % — LOW (ref 20.5–51.1)
MAGNESIUM SERPL-MCNC: 2 MG/DL — SIGNIFICANT CHANGE UP (ref 1.8–2.4)
MAGNESIUM SERPL-MCNC: 2 MG/DL — SIGNIFICANT CHANGE UP (ref 1.8–2.4)
MCHC RBC-ENTMCNC: 31.1 PG — HIGH (ref 27–31)
MCHC RBC-ENTMCNC: 31.1 PG — HIGH (ref 27–31)
MCHC RBC-ENTMCNC: 33.1 G/DL — SIGNIFICANT CHANGE UP (ref 32–37)
MCHC RBC-ENTMCNC: 33.1 G/DL — SIGNIFICANT CHANGE UP (ref 32–37)
MCV RBC AUTO: 94 FL — SIGNIFICANT CHANGE UP (ref 81–99)
MCV RBC AUTO: 94 FL — SIGNIFICANT CHANGE UP (ref 81–99)
MONOCYTES # BLD AUTO: 0.7 K/UL — HIGH (ref 0.1–0.6)
MONOCYTES # BLD AUTO: 0.7 K/UL — HIGH (ref 0.1–0.6)
MONOCYTES NFR BLD AUTO: 7.8 % — SIGNIFICANT CHANGE UP (ref 1.7–9.3)
MONOCYTES NFR BLD AUTO: 7.8 % — SIGNIFICANT CHANGE UP (ref 1.7–9.3)
NEUTROPHILS # BLD AUTO: 7.04 K/UL — HIGH (ref 1.4–6.5)
NEUTROPHILS # BLD AUTO: 7.04 K/UL — HIGH (ref 1.4–6.5)
NEUTROPHILS NFR BLD AUTO: 78 % — HIGH (ref 42.2–75.2)
NEUTROPHILS NFR BLD AUTO: 78 % — HIGH (ref 42.2–75.2)
NRBC # BLD: 0 /100 WBCS — SIGNIFICANT CHANGE UP (ref 0–0)
NRBC # BLD: 0 /100 WBCS — SIGNIFICANT CHANGE UP (ref 0–0)
PLATELET # BLD AUTO: 129 K/UL — LOW (ref 130–400)
PLATELET # BLD AUTO: 129 K/UL — LOW (ref 130–400)
PMV BLD: 12.3 FL — HIGH (ref 7.4–10.4)
PMV BLD: 12.3 FL — HIGH (ref 7.4–10.4)
POTASSIUM SERPL-MCNC: 4 MMOL/L — SIGNIFICANT CHANGE UP (ref 3.5–5)
POTASSIUM SERPL-MCNC: 4 MMOL/L — SIGNIFICANT CHANGE UP (ref 3.5–5)
POTASSIUM SERPL-SCNC: 4 MMOL/L — SIGNIFICANT CHANGE UP (ref 3.5–5)
POTASSIUM SERPL-SCNC: 4 MMOL/L — SIGNIFICANT CHANGE UP (ref 3.5–5)
PROT SERPL-MCNC: 5.5 G/DL — LOW (ref 6–8)
PROT SERPL-MCNC: 5.5 G/DL — LOW (ref 6–8)
RBC # BLD: 3.5 M/UL — LOW (ref 4.2–5.4)
RBC # BLD: 3.5 M/UL — LOW (ref 4.2–5.4)
RBC # FLD: 12.5 % — SIGNIFICANT CHANGE UP (ref 11.5–14.5)
RBC # FLD: 12.5 % — SIGNIFICANT CHANGE UP (ref 11.5–14.5)
SODIUM SERPL-SCNC: 147 MMOL/L — HIGH (ref 135–146)
SODIUM SERPL-SCNC: 147 MMOL/L — HIGH (ref 135–146)
TROPONIN T, HIGH SENSITIVITY RESULT: 1452 NG/L — CRITICAL HIGH (ref 6–13)
TROPONIN T, HIGH SENSITIVITY RESULT: 1452 NG/L — CRITICAL HIGH (ref 6–13)
WBC # BLD: 9.03 K/UL — SIGNIFICANT CHANGE UP (ref 4.8–10.8)
WBC # BLD: 9.03 K/UL — SIGNIFICANT CHANGE UP (ref 4.8–10.8)
WBC # FLD AUTO: 9.03 K/UL — SIGNIFICANT CHANGE UP (ref 4.8–10.8)
WBC # FLD AUTO: 9.03 K/UL — SIGNIFICANT CHANGE UP (ref 4.8–10.8)

## 2023-12-31 PROCEDURE — 99233 SBSQ HOSP IP/OBS HIGH 50: CPT

## 2023-12-31 PROCEDURE — 71045 X-RAY EXAM CHEST 1 VIEW: CPT | Mod: 26

## 2023-12-31 RX ORDER — LANOLIN ALCOHOL/MO/W.PET/CERES
3 CREAM (GRAM) TOPICAL AT BEDTIME
Refills: 0 | Status: DISCONTINUED | OUTPATIENT
Start: 2023-12-31 | End: 2024-01-09

## 2023-12-31 RX ORDER — ENOXAPARIN SODIUM 100 MG/ML
40 INJECTION SUBCUTANEOUS EVERY 24 HOURS
Refills: 0 | Status: DISCONTINUED | OUTPATIENT
Start: 2024-01-01 | End: 2024-01-09

## 2023-12-31 RX ORDER — FUROSEMIDE 40 MG
20 TABLET ORAL DAILY
Refills: 0 | Status: DISCONTINUED | OUTPATIENT
Start: 2023-12-31 | End: 2024-01-04

## 2023-12-31 RX ADMIN — Medication 30 MILLIGRAM(S): at 05:37

## 2023-12-31 RX ADMIN — Medication 3 MILLILITER(S): at 16:23

## 2023-12-31 RX ADMIN — Medication 3 MILLILITER(S): at 13:44

## 2023-12-31 RX ADMIN — PIPERACILLIN AND TAZOBACTAM 25 GRAM(S): 4; .5 INJECTION, POWDER, LYOPHILIZED, FOR SOLUTION INTRAVENOUS at 17:22

## 2023-12-31 RX ADMIN — Medication 20 MILLIGRAM(S): at 08:37

## 2023-12-31 RX ADMIN — ATORVASTATIN CALCIUM 40 MILLIGRAM(S): 80 TABLET, FILM COATED ORAL at 21:29

## 2023-12-31 RX ADMIN — Medication 40 MILLIGRAM(S): at 12:14

## 2023-12-31 RX ADMIN — PIPERACILLIN AND TAZOBACTAM 25 GRAM(S): 4; .5 INJECTION, POWDER, LYOPHILIZED, FOR SOLUTION INTRAVENOUS at 05:36

## 2023-12-31 RX ADMIN — ENOXAPARIN SODIUM 75 MILLIGRAM(S): 100 INJECTION SUBCUTANEOUS at 05:37

## 2023-12-31 RX ADMIN — Medication 3 MILLILITER(S): at 08:52

## 2023-12-31 RX ADMIN — Medication 30 MILLIGRAM(S): at 17:21

## 2023-12-31 RX ADMIN — Medication 3 MILLIGRAM(S): at 21:29

## 2023-12-31 RX ADMIN — Medication 81 MILLIGRAM(S): at 12:14

## 2023-12-31 RX ADMIN — CINACALCET 30 MILLIGRAM(S): 30 TABLET, FILM COATED ORAL at 12:14

## 2023-12-31 NOTE — PROGRESS NOTE ADULT - SUBJECTIVE AND OBJECTIVE BOX
Patient is a 89y old  Female who presents with a chief complaint of shortness of breath (12-31-23)      Pt seen and examined at bedside. No CP or SOB. 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    H/O total knee replacement, bilateral    H/O carpal tunnel repair  B/L        VITAL SIGNS (Last 24 hrs):  T(C): 37.3 (12-31-23 @ 12:00), Max: 37.3 (12-31-23 @ 12:00)  HR: 106 (12-31-23 @ 12:00) (86 - 116)  BP: 158/71 (12-31-23 @ 12:00) (131/67 - 158/86)  RR: 21 (12-31-23 @ 12:00) (20 - 42)  SpO2: 98% (12-31-23 @ 12:00) (94% - 99%)  Wt(kg): --  Daily     Daily     I&O's Summary    30 Dec 2023 07:01  -  31 Dec 2023 07:00  --------------------------------------------------------  IN: 225 mL / OUT: 1350 mL / NET: -1125 mL    31 Dec 2023 07:01  -  31 Dec 2023 12:51  --------------------------------------------------------  IN: 150 mL / OUT: 450 mL / NET: -300 mL        PHYSICAL EXAM:  GENERAL: NAD  HEAD:  Atraumatic, Normocephalic  EYES: EOMI, PERRLA, conjunctiva and sclera clear  NECK: Supple, No JVD  CHEST/LUNG: Clear to auscultation bilaterally; No wheeze  HEART: Regular rate and rhythm; No murmurs, rubs, or gallops  ABDOMEN: Soft, Nontender, Nondistended; Bowel sounds present  EXTREMITIES:  2+ Peripheral Pulses, No clubbing, cyanosis, or edema  PSYCH: AAOx3  NEUROLOGY: non-focal  SKIN: No rashes or lesions    Labs Reviewed  Spoke to patient in regards to abnormal labs.    CBC Full  -  ( 31 Dec 2023 05:39 )  WBC Count : 9.03 K/uL  Hemoglobin : 10.9 g/dL  Hematocrit : 32.9 %  Platelet Count - Automated : 129 K/uL  Mean Cell Volume : 94.0 fL  Mean Cell Hemoglobin : 31.1 pg  Mean Cell Hemoglobin Concentration : 33.1 g/dL  Auto Neutrophil # : 7.04 K/uL  Auto Lymphocyte # : 1.25 K/uL  Auto Monocyte # : 0.70 K/uL  Auto Eosinophil # : 0.00 K/uL  Auto Basophil # : 0.01 K/uL  Auto Neutrophil % : 78.0 %  Auto Lymphocyte % : 13.8 %  Auto Monocyte % : 7.8 %  Auto Eosinophil % : 0.0 %  Auto Basophil % : 0.1 %    BMP:    12-31 @ 05:39    Blood Urea Nitrogen - 53  Calcium - 8.7  Carbond Dioxide - 27  Chloride - 107  Creatinine - 1.2  Glucose - 96  Potassium - 4.0  Sodium - 147      Hemoglobin A1c -   PT/INR - ( 29 Dec 2023 14:34 )   PT: 11.70 sec;   INR: 1.03 ratio         PTT - ( 29 Dec 2023 14:34 )  PTT:29.1 sec  Urine Culture:  12-28 @ 06:14 Urine culture: --    Culture Results:   No growth at 48 Hours  Method Type: --  Organism: --  Organism Identification: --  Specimen Source: .Blood Blood-Peripheral        COVID Labs  CRP:    Procalcitonin, Serum: 0.18 ng/mL (12-28-23 @ 17:09)    D-Dimer:  1177 ng/mL DDU (12-28-23 @ 17:09)    Ferritin: 283 ng/mL (12-29-23 @ 06:45)          Imaging reviewed independently and reviewed read  < from: [facility] Duplex Lower Ext Vein Scan, Bilat (12.30.23 @ 12:52) >  IMPRESSION:  No evidence of deep venous thrombosis in either lower extremity.      < end of copied text >      < from: Xray Chest 1 View- PORTABLE-Routine (Xray Chest 1 View- PORTABLE-Routine in AM.) (12.30.23 @ 08:16) >  Impression:    Bilateral opacities, unchanged.    < end of copied text >    MEDICATIONS  (STANDING):  albuterol/ipratropium for Nebulization 3 milliLiter(s) Nebulizer every 4 hours  aspirin enteric coated 81 milliGRAM(s) Oral daily  atorvastatin 40 milliGRAM(s) Oral at bedtime  cinacalcet 30 milliGRAM(s) Oral daily  enoxaparin Injectable 75 milliGRAM(s) SubCutaneous every 24 hours  furosemide   Injectable 20 milliGRAM(s) IV Push daily  methylPREDNISolone sodium succinate Injectable 40 milliGRAM(s) IV Push every 24 hours  oseltamivir 30 milliGRAM(s) Oral two times a day  piperacillin/tazobactam IVPB.. 3.375 Gram(s) IV Intermittent every 12 hours  sodium chloride 3%  Inhalation 4 milliLiter(s) Inhalation every 12 hours    MEDICATIONS  (PRN):  acetaminophen     Tablet .. 650 milliGRAM(s) Oral every 6 hours PRN Moderate Pain (4 - 6)

## 2023-12-31 NOTE — PROGRESS NOTE ADULT - ASSESSMENT
Impression:    Acute hypoxic respiratory failure  Influenza A  Possible aspiration pneumonia  NSTEMI type 2   HFrEF  Hypernatremia      PLAN:    CNS: Avoid CNS depressants    HEENT: Oral care    PULMONARY:  HOB @ 45 degrees.  HFNC,/ niv wean as tolerated. Keep Sao2 92 to 96%    CARDIOVASCULAR: ECHO Maintain an even fluid balance    GI: GI prophylaxis.  Feeding.  Bowel regimen     RENAL:  Follow up lytes.  Correct as needed    INFECTIOUS DISEASE: abx. tamiflu    HEMATOLOGICAL:  DVT prophylaxis. Check LE dupplex.     ENDOCRINE:  Follow up FS.  Insulin protocol if needed.    MUSCULOSKELETAL: OOBTC    GOC/ poor prognosis    SDU

## 2023-12-31 NOTE — PROGRESS NOTE ADULT - ASSESSMENT
89-year-old female PMH HTN, ckd, op, hypothyroidism, hyperparathyroidism presenting with fever, URI symptoms, cough, and shortness of breath x 2 days.  Worsened overnight, lives with her sons who states that she was calling them throughout the night complaining that she felt like she could not breathe.  Febrile and hypoxic to 89% on room air on arrival, improved on 2 L nasal cannula.  Denies any headache, sore throat, CP, nausea vomiting diarrhea, abdominal pain, flank pain, urinary symptoms, rash.  Sons endorse recent sick contacts, family members with URI symptoms who she was with over the holidays.    # Acute hypoxemic respiratory failure 2/2 influenza pneumonia with possible  Acute CHF exacerbation   # aspiration in evening   # Sepsis present on admission  2/2 viral pneumonia   #elevated dimer  #lactemia- resolved  - Chest X-ray: Bilateral interstitial opacities and likely biapical thickening, new since distant prior   - CXr worsening infiltrates  - Pt now on bIPAP  - BNP elevated, JVD on exam   - c/w 5 day of tamiflu   - IV lasix, monitor UOP   - dw CC, consutled CC, upgrade to SDU   - DC unasyn, started zosyn   - duplex neg,   -  lovenox 75 mg daily, adjusted for crcl, CrCL 25, DC after 2 doses for 48 hrs for nstemi  -Discussed benefits and risks of starting anticoagulation including risk of excessively bleeding gums or nose bleeds, hematuria,  hemorrhagic stroke, GI bleed,  excessive bleeding after trauma or cuts and even death. Advised seek medical intervention immediately. Pt son agreed decided to start anticoagulation given benefits outweighs risk.  - follow up lactate-->1.2 now   - wean to HFNC     #NSTEMI vs ischemic demand   -ekg new TWI   - possible also from demand  - lovenox 75 mg daily x 2 doses for 48 hrs  - asa 181 mg, lipiotor  -hold Beta-Blocker therapy as per cardiology    #hyperna- encourgae po intake        # Normocytic anemia   - monitor     # HTN   - Hold amlodipine and Losartan while on diuresis     # CKD stage 3a   - Baseline creatinine: 1.2 , 1.4 now   - Avoid nephrotoxic agents    #Progress Note Handoff  Pending (specify):  as above  Family discussion: house staff updated pt family  Disposition: SDU   Decision to admit the pt is based on acuity as above   Overall very poor prognosis

## 2023-12-31 NOTE — PROGRESS NOTE ADULT - SUBJECTIVE AND OBJECTIVE BOX
Over Night Events: events noted, on NIV overnight, afebrile, echo noted    PHYSICAL EXAM    ICU Vital Signs Last 24 Hrs  T(C): 36.1 (31 Dec 2023 08:00), Max: 36.6 (30 Dec 2023 16:00)  T(F): 97 (31 Dec 2023 08:00), Max: 97.9 (30 Dec 2023 16:00)  HR: 96 (31 Dec 2023 08:54) (86 - 116)  BP: 158/86 (31 Dec 2023 08:00) (124/60 - 158/86)  BP(mean): 114 (31 Dec 2023 08:00) (83 - 114)  RR: 28 (31 Dec 2023 08:54) (20 - 42)  SpO2: 98% (31 Dec 2023 08:54) (94% - 99%)    O2 Parameters below as of 31 Dec 2023 08:54    O2 Flow (L/min): 50  O2 Concentration (%): 60        General: ill looking  Lungs: Bilateral crackles  Cardiovascular: CHRISS 2.6  Abdomen: Soft, Positive BS  Extremities: No clubbing   Neurological: Non focal       12-30-23 @ 07:01  -  12-31-23 @ 07:00  --------------------------------------------------------  IN:    IV PiggyBack: 25 mL    Oral Fluid: 200 mL  Total IN: 225 mL    OUT:    Voided (mL): 1350 mL  Total OUT: 1350 mL    Total NET: -1125 mL      12-31-23 @ 07:01  -  12-31-23 @ 09:25  --------------------------------------------------------  IN:    Oral Fluid: 50 mL  Total IN: 50 mL    OUT:    Voided (mL): 50 mL  Total OUT: 50 mL    Total NET: 0 mL          LABS:                          10.9   9.03  )-----------( 129      ( 31 Dec 2023 05:39 )             32.9                                               12-31    147<H>  |  107  |  53<H>  ----------------------------<  96  4.0   |  27  |  1.2    Ca    8.7      31 Dec 2023 05:39  Mg     2.0     12-31    TPro  5.5<L>  /  Alb  3.2<L>  /  TBili  0.5  /  DBili  x   /  AST  39  /  ALT  12  /  AlkPhos  52  12-31      PT/INR - ( 29 Dec 2023 14:34 )   PT: 11.70 sec;   INR: 1.03 ratio         PTT - ( 29 Dec 2023 14:34 )  PTT:29.1 sec                                       Urinalysis Basic - ( 31 Dec 2023 05:39 )    Color: x / Appearance: x / SG: x / pH: x  Gluc: 96 mg/dL / Ketone: x  / Bili: x / Urobili: x   Blood: x / Protein: x / Nitrite: x   Leuk Esterase: x / RBC: x / WBC x   Sq Epi: x / Non Sq Epi: x / Bacteria: x                                                  LIVER FUNCTIONS - ( 31 Dec 2023 05:39 )  Alb: 3.2 g/dL / Pro: 5.5 g/dL / ALK PHOS: 52 U/L / ALT: 12 U/L / AST: 39 U/L / GGT: x                                                                                                                                   ABG - ( 30 Dec 2023 08:37 )  pH, Arterial: 7.49  pH, Blood: x     /  pCO2: 34    /  pO2: 182   / HCO3: 26    / Base Excess: 2.8   /  SaO2: 100.0               MEDICATIONS  (STANDING):  albuterol/ipratropium for Nebulization 3 milliLiter(s) Nebulizer every 4 hours  aspirin enteric coated 81 milliGRAM(s) Oral daily  atorvastatin 40 milliGRAM(s) Oral at bedtime  cinacalcet 30 milliGRAM(s) Oral daily  enoxaparin Injectable 75 milliGRAM(s) SubCutaneous every 24 hours  furosemide   Injectable 20 milliGRAM(s) IV Push daily  methylPREDNISolone sodium succinate Injectable 40 milliGRAM(s) IV Push every 24 hours  oseltamivir 30 milliGRAM(s) Oral two times a day  piperacillin/tazobactam IVPB.. 3.375 Gram(s) IV Intermittent every 12 hours  sodium chloride 3%  Inhalation 4 milliLiter(s) Inhalation every 12 hours    MEDICATIONS  (PRN):  acetaminophen     Tablet .. 650 milliGRAM(s) Oral every 6 hours PRN Moderate Pain (4 - 6)    CXR NOTED

## 2024-01-01 LAB
ALBUMIN SERPL ELPH-MCNC: 3.1 G/DL — LOW (ref 3.5–5.2)
ALBUMIN SERPL ELPH-MCNC: 3.1 G/DL — LOW (ref 3.5–5.2)
ALP SERPL-CCNC: 49 U/L — SIGNIFICANT CHANGE UP (ref 30–115)
ALP SERPL-CCNC: 49 U/L — SIGNIFICANT CHANGE UP (ref 30–115)
ALT FLD-CCNC: 12 U/L — SIGNIFICANT CHANGE UP (ref 0–41)
ALT FLD-CCNC: 12 U/L — SIGNIFICANT CHANGE UP (ref 0–41)
ANION GAP SERPL CALC-SCNC: 10 MMOL/L — SIGNIFICANT CHANGE UP (ref 7–14)
ANION GAP SERPL CALC-SCNC: 10 MMOL/L — SIGNIFICANT CHANGE UP (ref 7–14)
AST SERPL-CCNC: 25 U/L — SIGNIFICANT CHANGE UP (ref 0–41)
AST SERPL-CCNC: 25 U/L — SIGNIFICANT CHANGE UP (ref 0–41)
BASOPHILS # BLD AUTO: 0.01 K/UL — SIGNIFICANT CHANGE UP (ref 0–0.2)
BASOPHILS # BLD AUTO: 0.01 K/UL — SIGNIFICANT CHANGE UP (ref 0–0.2)
BASOPHILS NFR BLD AUTO: 0.2 % — SIGNIFICANT CHANGE UP (ref 0–1)
BASOPHILS NFR BLD AUTO: 0.2 % — SIGNIFICANT CHANGE UP (ref 0–1)
BILIRUB SERPL-MCNC: 0.6 MG/DL — SIGNIFICANT CHANGE UP (ref 0.2–1.2)
BILIRUB SERPL-MCNC: 0.6 MG/DL — SIGNIFICANT CHANGE UP (ref 0.2–1.2)
BUN SERPL-MCNC: 58 MG/DL — HIGH (ref 10–20)
BUN SERPL-MCNC: 58 MG/DL — HIGH (ref 10–20)
CALCIUM SERPL-MCNC: 8.7 MG/DL — SIGNIFICANT CHANGE UP (ref 8.4–10.4)
CALCIUM SERPL-MCNC: 8.7 MG/DL — SIGNIFICANT CHANGE UP (ref 8.4–10.4)
CHLORIDE SERPL-SCNC: 104 MMOL/L — SIGNIFICANT CHANGE UP (ref 98–110)
CHLORIDE SERPL-SCNC: 104 MMOL/L — SIGNIFICANT CHANGE UP (ref 98–110)
CO2 SERPL-SCNC: 26 MMOL/L — SIGNIFICANT CHANGE UP (ref 17–32)
CO2 SERPL-SCNC: 26 MMOL/L — SIGNIFICANT CHANGE UP (ref 17–32)
CREAT SERPL-MCNC: 1.2 MG/DL — SIGNIFICANT CHANGE UP (ref 0.7–1.5)
CREAT SERPL-MCNC: 1.2 MG/DL — SIGNIFICANT CHANGE UP (ref 0.7–1.5)
EGFR: 40 ML/MIN/1.73M2 — LOW
EGFR: 40 ML/MIN/1.73M2 — LOW
EOSINOPHIL # BLD AUTO: 0 K/UL — SIGNIFICANT CHANGE UP (ref 0–0.7)
EOSINOPHIL # BLD AUTO: 0 K/UL — SIGNIFICANT CHANGE UP (ref 0–0.7)
EOSINOPHIL NFR BLD AUTO: 0 % — SIGNIFICANT CHANGE UP (ref 0–8)
EOSINOPHIL NFR BLD AUTO: 0 % — SIGNIFICANT CHANGE UP (ref 0–8)
GLUCOSE SERPL-MCNC: 114 MG/DL — HIGH (ref 70–99)
GLUCOSE SERPL-MCNC: 114 MG/DL — HIGH (ref 70–99)
HCT VFR BLD CALC: 29.6 % — LOW (ref 37–47)
HCT VFR BLD CALC: 29.6 % — LOW (ref 37–47)
HGB BLD-MCNC: 10 G/DL — LOW (ref 12–16)
HGB BLD-MCNC: 10 G/DL — LOW (ref 12–16)
IMM GRANULOCYTES NFR BLD AUTO: 0.5 % — HIGH (ref 0.1–0.3)
IMM GRANULOCYTES NFR BLD AUTO: 0.5 % — HIGH (ref 0.1–0.3)
LYMPHOCYTES # BLD AUTO: 1.1 K/UL — LOW (ref 1.2–3.4)
LYMPHOCYTES # BLD AUTO: 1.1 K/UL — LOW (ref 1.2–3.4)
LYMPHOCYTES # BLD AUTO: 17.2 % — LOW (ref 20.5–51.1)
LYMPHOCYTES # BLD AUTO: 17.2 % — LOW (ref 20.5–51.1)
MAGNESIUM SERPL-MCNC: 2 MG/DL — SIGNIFICANT CHANGE UP (ref 1.8–2.4)
MAGNESIUM SERPL-MCNC: 2 MG/DL — SIGNIFICANT CHANGE UP (ref 1.8–2.4)
MCHC RBC-ENTMCNC: 31 PG — SIGNIFICANT CHANGE UP (ref 27–31)
MCHC RBC-ENTMCNC: 31 PG — SIGNIFICANT CHANGE UP (ref 27–31)
MCHC RBC-ENTMCNC: 33.8 G/DL — SIGNIFICANT CHANGE UP (ref 32–37)
MCHC RBC-ENTMCNC: 33.8 G/DL — SIGNIFICANT CHANGE UP (ref 32–37)
MCV RBC AUTO: 91.6 FL — SIGNIFICANT CHANGE UP (ref 81–99)
MCV RBC AUTO: 91.6 FL — SIGNIFICANT CHANGE UP (ref 81–99)
MONOCYTES # BLD AUTO: 0.64 K/UL — HIGH (ref 0.1–0.6)
MONOCYTES # BLD AUTO: 0.64 K/UL — HIGH (ref 0.1–0.6)
MONOCYTES NFR BLD AUTO: 10 % — HIGH (ref 1.7–9.3)
MONOCYTES NFR BLD AUTO: 10 % — HIGH (ref 1.7–9.3)
NEUTROPHILS # BLD AUTO: 4.61 K/UL — SIGNIFICANT CHANGE UP (ref 1.4–6.5)
NEUTROPHILS # BLD AUTO: 4.61 K/UL — SIGNIFICANT CHANGE UP (ref 1.4–6.5)
NEUTROPHILS NFR BLD AUTO: 72.1 % — SIGNIFICANT CHANGE UP (ref 42.2–75.2)
NEUTROPHILS NFR BLD AUTO: 72.1 % — SIGNIFICANT CHANGE UP (ref 42.2–75.2)
NRBC # BLD: 0 /100 WBCS — SIGNIFICANT CHANGE UP (ref 0–0)
NRBC # BLD: 0 /100 WBCS — SIGNIFICANT CHANGE UP (ref 0–0)
PLATELET # BLD AUTO: 131 K/UL — SIGNIFICANT CHANGE UP (ref 130–400)
PLATELET # BLD AUTO: 131 K/UL — SIGNIFICANT CHANGE UP (ref 130–400)
PMV BLD: 12.5 FL — HIGH (ref 7.4–10.4)
PMV BLD: 12.5 FL — HIGH (ref 7.4–10.4)
POTASSIUM SERPL-MCNC: 4.1 MMOL/L — SIGNIFICANT CHANGE UP (ref 3.5–5)
POTASSIUM SERPL-MCNC: 4.1 MMOL/L — SIGNIFICANT CHANGE UP (ref 3.5–5)
POTASSIUM SERPL-SCNC: 4.1 MMOL/L — SIGNIFICANT CHANGE UP (ref 3.5–5)
POTASSIUM SERPL-SCNC: 4.1 MMOL/L — SIGNIFICANT CHANGE UP (ref 3.5–5)
PROT SERPL-MCNC: 5.4 G/DL — LOW (ref 6–8)
PROT SERPL-MCNC: 5.4 G/DL — LOW (ref 6–8)
RBC # BLD: 3.23 M/UL — LOW (ref 4.2–5.4)
RBC # BLD: 3.23 M/UL — LOW (ref 4.2–5.4)
RBC # FLD: 12.1 % — SIGNIFICANT CHANGE UP (ref 11.5–14.5)
RBC # FLD: 12.1 % — SIGNIFICANT CHANGE UP (ref 11.5–14.5)
SODIUM SERPL-SCNC: 140 MMOL/L — SIGNIFICANT CHANGE UP (ref 135–146)
SODIUM SERPL-SCNC: 140 MMOL/L — SIGNIFICANT CHANGE UP (ref 135–146)
TROPONIN SAMPLING TIME: SIGNIFICANT CHANGE UP
TROPONIN SAMPLING TIME: SIGNIFICANT CHANGE UP
TROPONIN T, HIGH SENSITIVITY RESULT: 1765 NG/L — CRITICAL HIGH (ref 6–13)
TROPONIN T, HIGH SENSITIVITY RESULT: 1765 NG/L — CRITICAL HIGH (ref 6–13)
WBC # BLD: 6.39 K/UL — SIGNIFICANT CHANGE UP (ref 4.8–10.8)
WBC # BLD: 6.39 K/UL — SIGNIFICANT CHANGE UP (ref 4.8–10.8)
WBC # FLD AUTO: 6.39 K/UL — SIGNIFICANT CHANGE UP (ref 4.8–10.8)
WBC # FLD AUTO: 6.39 K/UL — SIGNIFICANT CHANGE UP (ref 4.8–10.8)

## 2024-01-01 PROCEDURE — 99233 SBSQ HOSP IP/OBS HIGH 50: CPT

## 2024-01-01 RX ADMIN — ATORVASTATIN CALCIUM 40 MILLIGRAM(S): 80 TABLET, FILM COATED ORAL at 21:08

## 2024-01-01 RX ADMIN — ENOXAPARIN SODIUM 40 MILLIGRAM(S): 100 INJECTION SUBCUTANEOUS at 12:13

## 2024-01-01 RX ADMIN — Medication 3 MILLILITER(S): at 08:04

## 2024-01-01 RX ADMIN — Medication 30 MILLIGRAM(S): at 17:13

## 2024-01-01 RX ADMIN — PIPERACILLIN AND TAZOBACTAM 25 GRAM(S): 4; .5 INJECTION, POWDER, LYOPHILIZED, FOR SOLUTION INTRAVENOUS at 05:35

## 2024-01-01 RX ADMIN — Medication 3 MILLILITER(S): at 20:58

## 2024-01-01 RX ADMIN — PIPERACILLIN AND TAZOBACTAM 25 GRAM(S): 4; .5 INJECTION, POWDER, LYOPHILIZED, FOR SOLUTION INTRAVENOUS at 17:13

## 2024-01-01 RX ADMIN — Medication 40 MILLIGRAM(S): at 12:11

## 2024-01-01 RX ADMIN — Medication 30 MILLIGRAM(S): at 05:35

## 2024-01-01 RX ADMIN — Medication 81 MILLIGRAM(S): at 12:12

## 2024-01-01 RX ADMIN — Medication 20 MILLIGRAM(S): at 05:35

## 2024-01-01 RX ADMIN — CINACALCET 30 MILLIGRAM(S): 30 TABLET, FILM COATED ORAL at 12:11

## 2024-01-01 RX ADMIN — Medication 3 MILLILITER(S): at 14:51

## 2024-01-01 RX ADMIN — Medication 3 MILLIGRAM(S): at 21:09

## 2024-01-01 NOTE — PROGRESS NOTE ADULT - SUBJECTIVE AND OBJECTIVE BOX
Patient is a 89y old  Female who presents with a chief complaint of shortness of breath (01-01-24)      Pt seen and examined at bedside. No CP or SOB.          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    H/O total knee replacement, bilateral    H/O carpal tunnel repair  B/L        VITAL SIGNS (Last 24 hrs):  T(C): 36.2 (01-01-24 @ 11:00), Max: 37 (12-31-23 @ 16:00)  HR: 76 (01-01-24 @ 11:20) (69 - 101)  BP: 119/56 (01-01-24 @ 11:20) (106/53 - 132/67)  RR: 20 (01-01-24 @ 11:20) (17 - 37)  SpO2: 100% (01-01-24 @ 11:20) (98% - 100%)  Wt(kg): --  Daily     Daily     I&O's Summary    31 Dec 2023 07:01  -  01 Jan 2024 07:00  --------------------------------------------------------  IN: 450 mL / OUT: 750 mL / NET: -300 mL    01 Jan 2024 07:01  -  01 Jan 2024 12:43  --------------------------------------------------------  IN: 0 mL / OUT: 600 mL / NET: -600 mL        PHYSICAL EXAM:  GENERAL: NAD, well-developed  HEAD:  Atraumatic, Normocephalic  EYES: EOMI, PERRLA, conjunctiva and sclera clear  NECK: Supple, No JVD  CHEST/LUNG: Clear to auscultation bilaterally; No wheeze  HEART: Regular rate and rhythm; No murmurs, rubs, or gallops  ABDOMEN: Soft, Nontender, Nondistended; Bowel sounds present  EXTREMITIES:  2+ Peripheral Pulses, No clubbing, cyanosis, or edema  PSYCH: AAOx3  NEUROLOGY: non-focal  SKIN: No rashes or lesions    Labs Reviewed  Spoke to patient in regards to abnormal labs.    CBC Full  -  ( 01 Jan 2024 05:39 )  WBC Count : 6.39 K/uL  Hemoglobin : 10.0 g/dL  Hematocrit : 29.6 %  Platelet Count - Automated : 131 K/uL  Mean Cell Volume : 91.6 fL  Mean Cell Hemoglobin : 31.0 pg  Mean Cell Hemoglobin Concentration : 33.8 g/dL  Auto Neutrophil # : 4.61 K/uL  Auto Lymphocyte # : 1.10 K/uL  Auto Monocyte # : 0.64 K/uL  Auto Eosinophil # : 0.00 K/uL  Auto Basophil # : 0.01 K/uL  Auto Neutrophil % : 72.1 %  Auto Lymphocyte % : 17.2 %  Auto Monocyte % : 10.0 %  Auto Eosinophil % : 0.0 %  Auto Basophil % : 0.2 %    BMP:    01-01 @ 05:39    Blood Urea Nitrogen - 58  Calcium - 8.7  Carbond Dioxide - 26  Chloride - 104  Creatinine - 1.2  Glucose - 114  Potassium - 4.1  Sodium - 140      Hemoglobin A1c -   PT/INR - ( 29 Dec 2023 14:34 )   PT: 11.70 sec;   INR: 1.03 ratio         PTT - ( 29 Dec 2023 14:34 )  PTT:29.1 sec  Urine Culture:  12-28 @ 06:14 Urine culture: --    Culture Results:   No growth at 72 Hours  Method Type: --  Organism: --  Organism Identification: --  Specimen Source: .Blood Blood-Peripheral        COVID Labs  CRP:    Procalcitonin, Serum: 0.18 ng/mL (12-28-23 @ 17:09)    D-Dimer:  1177 ng/mL DDU (12-28-23 @ 17:09)    Ferritin: 283 ng/mL (12-29-23 @ 06:45)          Imaging reviewed independently and reviewed read        MEDICATIONS  (STANDING):  albuterol/ipratropium for Nebulization 3 milliLiter(s) Nebulizer every 4 hours  aspirin enteric coated 81 milliGRAM(s) Oral daily  atorvastatin 40 milliGRAM(s) Oral at bedtime  cinacalcet 30 milliGRAM(s) Oral daily  enoxaparin Injectable 40 milliGRAM(s) SubCutaneous every 24 hours  furosemide   Injectable 20 milliGRAM(s) IV Push daily  melatonin 3 milliGRAM(s) Oral at bedtime  methylPREDNISolone sodium succinate Injectable 40 milliGRAM(s) IV Push every 24 hours  oseltamivir 30 milliGRAM(s) Oral two times a day  piperacillin/tazobactam IVPB.. 3.375 Gram(s) IV Intermittent every 12 hours  sodium chloride 3%  Inhalation 4 milliLiter(s) Inhalation every 12 hours    MEDICATIONS  (PRN):  acetaminophen     Tablet .. 650 milliGRAM(s) Oral every 6 hours PRN Moderate Pain (4 - 6)

## 2024-01-01 NOTE — PROGRESS NOTE ADULT - ASSESSMENT
Impression:    Acute hypoxic respiratory failure on HHFNC  Influenza A pneumonia  Possible aspiration pneumonia  NSTEMI type 2   HFrEF        PLAN:    CNS: Avoid CNS depressants    HEENT: Oral care    PULMONARY:  HOB @ 45 degrees.  HHFNC,/ NIV wean as tolerated. Keep Sao2 92 to 96%    CARDIOVASCULAR: diuresis as tolerated    GI: GI prophylaxis.  Feeding.  Bowel regimen     RENAL:  Follow up lytes.  Correct as needed    INFECTIOUS DISEASE: abx. tamiflu    HEMATOLOGICAL:  DVT prophylaxis. LE doppler -    ENDOCRINE:  Follow up FS.  Insulin protocol if needed.    MUSCULOSKELETAL: OOBTC    GOC/ poor prognosis    SDU

## 2024-01-01 NOTE — PROGRESS NOTE ADULT - SUBJECTIVE AND OBJECTIVE BOX
Over Night Events: events noted on HHFNC 60%, LE doppler -, afebrile    PHYSICAL EXAM    ICU Vital Signs Last 24 Hrs  T(C): 36.4 (31 Dec 2023 20:00), Max: 37.3 (31 Dec 2023 12:00)  T(F): 97.6 (31 Dec 2023 20:00), Max: 99.1 (31 Dec 2023 12:00)  HR: 69 (01 Jan 2024 04:00) (69 - 113)  BP: 106/53 (01 Jan 2024 04:00) (106/53 - 158/86)  BP(mean): 76 (01 Jan 2024 04:00) (76 - 114)  RR: 17 (01 Jan 2024 04:00) (17 - 37)  SpO2: 100% (01 Jan 2024 04:00) (94% - 100%)    O2 Parameters below as of 01 Jan 2024 04:00  Patient On (Oxygen Delivery Method): nasal cannula, high flow  O2 Flow (L/min): 50  O2 Concentration (%): 60        General: ill looking  Lungs: Bilateral rhonchi  Cardiovascular: CHRISS 2.6  Abdomen: Soft, Positive BS  Extremities: No clubbing   Neurological: Non focal       12-30-23 @ 07:01  -  12-31-23 @ 07:00  --------------------------------------------------------  IN:    IV PiggyBack: 25 mL    Oral Fluid: 200 mL  Total IN: 225 mL    OUT:    Voided (mL): 1350 mL  Total OUT: 1350 mL    Total NET: -1125 mL      12-31-23 @ 07:01  -  01-01-24 @ 06:55  --------------------------------------------------------  IN:    IV PiggyBack: 100 mL    Oral Fluid: 350 mL  Total IN: 450 mL    OUT:    Voided (mL): 750 mL  Total OUT: 750 mL    Total NET: -300 mL          LABS:                          10.0   6.39  )-----------( 131      ( 01 Jan 2024 05:39 )             29.6                                               01-01    140  |  104  |  58<H>  ----------------------------<  114<H>  4.1   |  26  |  1.2    Ca    8.7      01 Jan 2024 05:39  Mg     2.0     01-01    TPro  5.4<L>  /  Alb  3.1<L>  /  TBili  0.6  /  DBili  x   /  AST  25  /  ALT  12  /  AlkPhos  49  01-01                                             Urinalysis Basic - ( 01 Jan 2024 05:39 )    Color: x / Appearance: x / SG: x / pH: x  Gluc: 114 mg/dL / Ketone: x  / Bili: x / Urobili: x   Blood: x / Protein: x / Nitrite: x   Leuk Esterase: x / RBC: x / WBC x   Sq Epi: x / Non Sq Epi: x / Bacteria: x                                                  LIVER FUNCTIONS - ( 01 Jan 2024 05:39 )  Alb: 3.1 g/dL / Pro: 5.4 g/dL / ALK PHOS: 49 U/L / ALT: 12 U/L / AST: 25 U/L / GGT: x                                                                                                                                   ABG - ( 30 Dec 2023 08:37 )  pH, Arterial: 7.49  pH, Blood: x     /  pCO2: 34    /  pO2: 182   / HCO3: 26    / Base Excess: 2.8   /  SaO2: 100.0               MEDICATIONS  (STANDING):  albuterol/ipratropium for Nebulization 3 milliLiter(s) Nebulizer every 4 hours  aspirin enteric coated 81 milliGRAM(s) Oral daily  atorvastatin 40 milliGRAM(s) Oral at bedtime  cinacalcet 30 milliGRAM(s) Oral daily  enoxaparin Injectable 40 milliGRAM(s) SubCutaneous every 24 hours  furosemide   Injectable 20 milliGRAM(s) IV Push daily  melatonin 3 milliGRAM(s) Oral at bedtime  methylPREDNISolone sodium succinate Injectable 40 milliGRAM(s) IV Push every 24 hours  oseltamivir 30 milliGRAM(s) Oral two times a day  piperacillin/tazobactam IVPB.. 3.375 Gram(s) IV Intermittent every 12 hours  sodium chloride 3%  Inhalation 4 milliLiter(s) Inhalation every 12 hours    MEDICATIONS  (PRN):  acetaminophen     Tablet .. 650 milliGRAM(s) Oral every 6 hours PRN Moderate Pain (4 - 6)

## 2024-01-01 NOTE — PROGRESS NOTE ADULT - ASSESSMENT
89-year-old female PMH HTN, ckd, op, hypothyroidism, hyperparathyroidism presenting with fever, URI symptoms, cough, and shortness of breath x 2 days.  Worsened overnight, lives with her sons who states that she was calling them throughout the night complaining that she felt like she could not breathe.  Febrile and hypoxic to 89% on room air on arrival, improved on 2 L nasal cannula.  Denies any headache, sore throat, CP, nausea vomiting diarrhea, abdominal pain, flank pain, urinary symptoms, rash.  Sons endorse recent sick contacts, family members with URI symptoms who she was with over the holidays.    # Acute hypoxemic respiratory failure 2/2 influenza pneumonia with possible  Acute CHF exacerbation   # aspiration in evening   # Sepsis present on admission  2/2 viral pneumonia   #elevated dimer  #lactemia- resolved  - Chest X-ray: Bilateral interstitial opacities and likely biapical thickening, new since distant prior   - CXr worsening infiltrates  - Pt now on bIPAP  - BNP elevated, JVD on exam   - c/w 5 day of tamiflu   - IV lasix, monitor UOP   - dw CC, consutled CC, upgrade to SDU   - DC unasyn, started zosyn   - duplex neg,   -  lovenox 75 mg daily, adjusted for crcl, CrCL 25, DC after 2 doses for 48 hrs for nstemi  -Discussed benefits and risks of starting anticoagulation including risk of excessively bleeding gums or nose bleeds, hematuria,  hemorrhagic stroke, GI bleed,  excessive bleeding after trauma or cuts and even death. Advised seek medical intervention immediately. Pt son agreed decided to start anticoagulation given benefits outweighs risk.  - follow up lactate-->1.2 now   - wean to HFNC now 40/40    #NSTEMI vs ischemic demand   - ekg new TWI   - possible also from demand  - lovenox 75 mg daily x 2 doses for 48 hrs  - asa 181 mg, Lipitor  - hold Beta-Blocker therapy as per cardiology    #hyperna- encourage po intake - resolved       # Normocytic anemia   - monitor     # HTN   - Hold amlodipine and Losartan while on diuresis     # CKD stage 3a   - Baseline creatinine: 1.2 , 1.4 now   - Avoid nephrotoxic agents    #Progress Note Handoff  Pending (specify):  as above  Family discussion: house staff updated pt family  Disposition: SDU   Decision to admit the pt is based on acuity as above   Overall very poor prognosis

## 2024-01-02 LAB
ALBUMIN SERPL ELPH-MCNC: 3.2 G/DL — LOW (ref 3.5–5.2)
ALBUMIN SERPL ELPH-MCNC: 3.2 G/DL — LOW (ref 3.5–5.2)
ALP SERPL-CCNC: 49 U/L — SIGNIFICANT CHANGE UP (ref 30–115)
ALP SERPL-CCNC: 49 U/L — SIGNIFICANT CHANGE UP (ref 30–115)
ALT FLD-CCNC: 15 U/L — SIGNIFICANT CHANGE UP (ref 0–41)
ALT FLD-CCNC: 15 U/L — SIGNIFICANT CHANGE UP (ref 0–41)
ANION GAP SERPL CALC-SCNC: 11 MMOL/L — SIGNIFICANT CHANGE UP (ref 7–14)
ANION GAP SERPL CALC-SCNC: 11 MMOL/L — SIGNIFICANT CHANGE UP (ref 7–14)
AST SERPL-CCNC: 22 U/L — SIGNIFICANT CHANGE UP (ref 0–41)
AST SERPL-CCNC: 22 U/L — SIGNIFICANT CHANGE UP (ref 0–41)
BASOPHILS # BLD AUTO: 0 K/UL — SIGNIFICANT CHANGE UP (ref 0–0.2)
BASOPHILS # BLD AUTO: 0 K/UL — SIGNIFICANT CHANGE UP (ref 0–0.2)
BASOPHILS NFR BLD AUTO: 0 % — SIGNIFICANT CHANGE UP (ref 0–1)
BASOPHILS NFR BLD AUTO: 0 % — SIGNIFICANT CHANGE UP (ref 0–1)
BILIRUB SERPL-MCNC: 0.6 MG/DL — SIGNIFICANT CHANGE UP (ref 0.2–1.2)
BILIRUB SERPL-MCNC: 0.6 MG/DL — SIGNIFICANT CHANGE UP (ref 0.2–1.2)
BUN SERPL-MCNC: 60 MG/DL — HIGH (ref 10–20)
BUN SERPL-MCNC: 60 MG/DL — HIGH (ref 10–20)
CALCIUM SERPL-MCNC: 8.9 MG/DL — SIGNIFICANT CHANGE UP (ref 8.4–10.4)
CALCIUM SERPL-MCNC: 8.9 MG/DL — SIGNIFICANT CHANGE UP (ref 8.4–10.4)
CHLORIDE SERPL-SCNC: 101 MMOL/L — SIGNIFICANT CHANGE UP (ref 98–110)
CHLORIDE SERPL-SCNC: 101 MMOL/L — SIGNIFICANT CHANGE UP (ref 98–110)
CO2 SERPL-SCNC: 27 MMOL/L — SIGNIFICANT CHANGE UP (ref 17–32)
CO2 SERPL-SCNC: 27 MMOL/L — SIGNIFICANT CHANGE UP (ref 17–32)
CREAT SERPL-MCNC: 1.2 MG/DL — SIGNIFICANT CHANGE UP (ref 0.7–1.5)
CREAT SERPL-MCNC: 1.2 MG/DL — SIGNIFICANT CHANGE UP (ref 0.7–1.5)
CULTURE RESULTS: SIGNIFICANT CHANGE UP
EGFR: 40 ML/MIN/1.73M2 — LOW
EGFR: 40 ML/MIN/1.73M2 — LOW
EOSINOPHIL # BLD AUTO: 0 K/UL — SIGNIFICANT CHANGE UP (ref 0–0.7)
EOSINOPHIL # BLD AUTO: 0 K/UL — SIGNIFICANT CHANGE UP (ref 0–0.7)
EOSINOPHIL NFR BLD AUTO: 0 % — SIGNIFICANT CHANGE UP (ref 0–8)
EOSINOPHIL NFR BLD AUTO: 0 % — SIGNIFICANT CHANGE UP (ref 0–8)
GLUCOSE SERPL-MCNC: 149 MG/DL — HIGH (ref 70–99)
GLUCOSE SERPL-MCNC: 149 MG/DL — HIGH (ref 70–99)
HCT VFR BLD CALC: 29.9 % — LOW (ref 37–47)
HCT VFR BLD CALC: 29.9 % — LOW (ref 37–47)
HGB BLD-MCNC: 9.9 G/DL — LOW (ref 12–16)
HGB BLD-MCNC: 9.9 G/DL — LOW (ref 12–16)
IMM GRANULOCYTES NFR BLD AUTO: 0.6 % — HIGH (ref 0.1–0.3)
IMM GRANULOCYTES NFR BLD AUTO: 0.6 % — HIGH (ref 0.1–0.3)
LYMPHOCYTES # BLD AUTO: 0.88 K/UL — LOW (ref 1.2–3.4)
LYMPHOCYTES # BLD AUTO: 0.88 K/UL — LOW (ref 1.2–3.4)
LYMPHOCYTES # BLD AUTO: 13.8 % — LOW (ref 20.5–51.1)
LYMPHOCYTES # BLD AUTO: 13.8 % — LOW (ref 20.5–51.1)
MAGNESIUM SERPL-MCNC: 2 MG/DL — SIGNIFICANT CHANGE UP (ref 1.8–2.4)
MAGNESIUM SERPL-MCNC: 2 MG/DL — SIGNIFICANT CHANGE UP (ref 1.8–2.4)
MCHC RBC-ENTMCNC: 30.4 PG — SIGNIFICANT CHANGE UP (ref 27–31)
MCHC RBC-ENTMCNC: 30.4 PG — SIGNIFICANT CHANGE UP (ref 27–31)
MCHC RBC-ENTMCNC: 33.1 G/DL — SIGNIFICANT CHANGE UP (ref 32–37)
MCHC RBC-ENTMCNC: 33.1 G/DL — SIGNIFICANT CHANGE UP (ref 32–37)
MCV RBC AUTO: 91.7 FL — SIGNIFICANT CHANGE UP (ref 81–99)
MCV RBC AUTO: 91.7 FL — SIGNIFICANT CHANGE UP (ref 81–99)
MONOCYTES # BLD AUTO: 0.63 K/UL — HIGH (ref 0.1–0.6)
MONOCYTES # BLD AUTO: 0.63 K/UL — HIGH (ref 0.1–0.6)
MONOCYTES NFR BLD AUTO: 9.9 % — HIGH (ref 1.7–9.3)
MONOCYTES NFR BLD AUTO: 9.9 % — HIGH (ref 1.7–9.3)
NEUTROPHILS # BLD AUTO: 4.83 K/UL — SIGNIFICANT CHANGE UP (ref 1.4–6.5)
NEUTROPHILS # BLD AUTO: 4.83 K/UL — SIGNIFICANT CHANGE UP (ref 1.4–6.5)
NEUTROPHILS NFR BLD AUTO: 75.7 % — HIGH (ref 42.2–75.2)
NEUTROPHILS NFR BLD AUTO: 75.7 % — HIGH (ref 42.2–75.2)
NRBC # BLD: 0 /100 WBCS — SIGNIFICANT CHANGE UP (ref 0–0)
NRBC # BLD: 0 /100 WBCS — SIGNIFICANT CHANGE UP (ref 0–0)
PLATELET # BLD AUTO: 127 K/UL — LOW (ref 130–400)
PLATELET # BLD AUTO: 127 K/UL — LOW (ref 130–400)
PMV BLD: 12.4 FL — HIGH (ref 7.4–10.4)
PMV BLD: 12.4 FL — HIGH (ref 7.4–10.4)
POTASSIUM SERPL-MCNC: 4 MMOL/L — SIGNIFICANT CHANGE UP (ref 3.5–5)
POTASSIUM SERPL-MCNC: 4 MMOL/L — SIGNIFICANT CHANGE UP (ref 3.5–5)
POTASSIUM SERPL-SCNC: 4 MMOL/L — SIGNIFICANT CHANGE UP (ref 3.5–5)
POTASSIUM SERPL-SCNC: 4 MMOL/L — SIGNIFICANT CHANGE UP (ref 3.5–5)
PROT SERPL-MCNC: 5.5 G/DL — LOW (ref 6–8)
PROT SERPL-MCNC: 5.5 G/DL — LOW (ref 6–8)
RBC # BLD: 3.26 M/UL — LOW (ref 4.2–5.4)
RBC # BLD: 3.26 M/UL — LOW (ref 4.2–5.4)
RBC # FLD: 11.8 % — SIGNIFICANT CHANGE UP (ref 11.5–14.5)
RBC # FLD: 11.8 % — SIGNIFICANT CHANGE UP (ref 11.5–14.5)
SODIUM SERPL-SCNC: 139 MMOL/L — SIGNIFICANT CHANGE UP (ref 135–146)
SODIUM SERPL-SCNC: 139 MMOL/L — SIGNIFICANT CHANGE UP (ref 135–146)
SPECIMEN SOURCE: SIGNIFICANT CHANGE UP
TROPONIN T, HIGH SENSITIVITY RESULT: 1864 NG/L — CRITICAL HIGH (ref 6–13)
TROPONIN T, HIGH SENSITIVITY RESULT: 1864 NG/L — CRITICAL HIGH (ref 6–13)
WBC # BLD: 6.38 K/UL — SIGNIFICANT CHANGE UP (ref 4.8–10.8)
WBC # BLD: 6.38 K/UL — SIGNIFICANT CHANGE UP (ref 4.8–10.8)
WBC # FLD AUTO: 6.38 K/UL — SIGNIFICANT CHANGE UP (ref 4.8–10.8)
WBC # FLD AUTO: 6.38 K/UL — SIGNIFICANT CHANGE UP (ref 4.8–10.8)

## 2024-01-02 PROCEDURE — 99233 SBSQ HOSP IP/OBS HIGH 50: CPT

## 2024-01-02 RX ADMIN — Medication 40 MILLIGRAM(S): at 12:05

## 2024-01-02 RX ADMIN — PIPERACILLIN AND TAZOBACTAM 25 GRAM(S): 4; .5 INJECTION, POWDER, LYOPHILIZED, FOR SOLUTION INTRAVENOUS at 05:10

## 2024-01-02 RX ADMIN — Medication 3 MILLILITER(S): at 13:32

## 2024-01-02 RX ADMIN — Medication 30 MILLIGRAM(S): at 17:56

## 2024-01-02 RX ADMIN — Medication 30 MILLIGRAM(S): at 05:09

## 2024-01-02 RX ADMIN — Medication 20 MILLIGRAM(S): at 05:09

## 2024-01-02 RX ADMIN — ENOXAPARIN SODIUM 40 MILLIGRAM(S): 100 INJECTION SUBCUTANEOUS at 12:07

## 2024-01-02 RX ADMIN — Medication 3 MILLIGRAM(S): at 22:01

## 2024-01-02 RX ADMIN — CINACALCET 30 MILLIGRAM(S): 30 TABLET, FILM COATED ORAL at 12:06

## 2024-01-02 RX ADMIN — ATORVASTATIN CALCIUM 40 MILLIGRAM(S): 80 TABLET, FILM COATED ORAL at 22:01

## 2024-01-02 RX ADMIN — Medication 81 MILLIGRAM(S): at 12:05

## 2024-01-02 RX ADMIN — PIPERACILLIN AND TAZOBACTAM 25 GRAM(S): 4; .5 INJECTION, POWDER, LYOPHILIZED, FOR SOLUTION INTRAVENOUS at 17:57

## 2024-01-02 RX ADMIN — Medication 3 MILLILITER(S): at 19:32

## 2024-01-02 NOTE — PROGRESS NOTE ADULT - SUBJECTIVE AND OBJECTIVE BOX
Over Night Events: events noted, Afebrile    PHYSICAL EXAM    ICU Vital Signs Last 24 Hrs  T(C): 36.8 (02 Jan 2024 00:00), Max: 36.9 (01 Jan 2024 20:00)  T(F): 98.2 (02 Jan 2024 00:00), Max: 98.4 (01 Jan 2024 20:00)  HR: 76 (02 Jan 2024 00:00) (76 - 111)  BP: 106/51 (02 Jan 2024 00:00) (106/51 - 127/59)  BP(mean): 74 (02 Jan 2024 00:00) (74 - 92)  ABP: --  ABP(mean): --  RR: 21 (02 Jan 2024 00:00) (19 - 51)  SpO2: 100% (02 Jan 2024 00:00) (98% - 100%)    O2 Parameters below as of 02 Jan 2024 00:00  Patient On (Oxygen Delivery Method): BiPAP/CPAP    O2 Concentration (%): 60        General: ill looking   Lungs: Bilateral rhonchi  Cardiovascular: CHRISS 2.6  Abdomen: Soft, Positive BS  Extremities: No clubbing   Skin: Warm  Neurological: Non focal       01-01-24 @ 07:01  -  01-02-24 @ 07:00  --------------------------------------------------------  IN:  Total IN: 0 mL    OUT:    Voided (mL): 850 mL  Total OUT: 850 mL    Total NET: -850 mL          LABS:                          9.9    6.38  )-----------( 127      ( 02 Jan 2024 04:55 )             29.9                                               01-02    139  |  101  |  60<H>  ----------------------------<  149<H>  4.0   |  27  |  1.2    Ca    8.9      02 Jan 2024 04:55  Mg     2.0     01-02    TPro  5.5<L>  /  Alb  3.2<L>  /  TBili  0.6  /  DBili  x   /  AST  22  /  ALT  15  /  AlkPhos  49  01-02                                             Urinalysis Basic - ( 02 Jan 2024 04:55 )    Color: x / Appearance: x / SG: x / pH: x  Gluc: 149 mg/dL / Ketone: x  / Bili: x / Urobili: x   Blood: x / Protein: x / Nitrite: x   Leuk Esterase: x / RBC: x / WBC x   Sq Epi: x / Non Sq Epi: x / Bacteria: x                                                  LIVER FUNCTIONS - ( 02 Jan 2024 04:55 )  Alb: 3.2 g/dL / Pro: 5.5 g/dL / ALK PHOS: 49 U/L / ALT: 15 U/L / AST: 22 U/L / GGT: x                                                                                                                                       MEDICATIONS  (STANDING):  albuterol/ipratropium for Nebulization 3 milliLiter(s) Nebulizer every 4 hours  aspirin enteric coated 81 milliGRAM(s) Oral daily  atorvastatin 40 milliGRAM(s) Oral at bedtime  cinacalcet 30 milliGRAM(s) Oral daily  enoxaparin Injectable 40 milliGRAM(s) SubCutaneous every 24 hours  furosemide   Injectable 20 milliGRAM(s) IV Push daily  melatonin 3 milliGRAM(s) Oral at bedtime  methylPREDNISolone sodium succinate Injectable 40 milliGRAM(s) IV Push every 24 hours  oseltamivir 30 milliGRAM(s) Oral two times a day  piperacillin/tazobactam IVPB.. 3.375 Gram(s) IV Intermittent every 12 hours  sodium chloride 3%  Inhalation 4 milliLiter(s) Inhalation every 12 hours    MEDICATIONS  (PRN):  acetaminophen     Tablet .. 650 milliGRAM(s) Oral every 6 hours PRN Moderate Pain (4 - 6)

## 2024-01-02 NOTE — PROGRESS NOTE ADULT - ASSESSMENT
89-year-old female PMH HTN, ckd, op, hypothyroidism, hyperparathyroidism presenting with fever, URI symptoms, cough, and shortness of breath x 2 days.  Worsened overnight, lives with her sons who states that she was calling them throughout the night complaining that she felt like she could not breathe.  Febrile and hypoxic to 89% on room air on arrival, improved on 2 L nasal cannula.  Denies any headache, sore throat, CP, nausea vomiting diarrhea, abdominal pain, flank pain, urinary symptoms, rash.  Sons endorse recent sick contacts, family members with URI symptoms who she was with over the holidays.    # Acute hypoxemic respiratory failure 2/2 influenza pneumonia with possible  Acute CHF exacerbation   # aspiration in evening   # Sepsis present on admission  2/2 viral pneumonia   #elevated dimer  #lactemia- resolved  - Chest X-ray: Bilateral interstitial opacities and likely biapical thickening, new since distant prior   - CXr worsening infiltrates  - Pt now on bIPAP  - BNP elevated, JVD on exam   - c/w 5 day of tamiflu   - IV lasix, monitor UOP   - dw CC, consutled CC, upgrade to SDU   - DC unasyn, started zosyn   - duplex neg,   -  lovenox 75 mg daily, adjusted for crcl, CrCL 25, DC after 2 doses for 48 hrs for nstemi  -Discussed benefits and risks of starting anticoagulation including risk of excessively bleeding gums or nose bleeds, hematuria,  hemorrhagic stroke, GI bleed,  excessive bleeding after trauma or cuts and even death. Advised seek medical intervention immediately. Pt son agreed decided to start anticoagulation given benefits outweighs risk.  - follow up lactate-->1.2 now   - wean to HFNC now 40/40--> now on NC     #NSTEMI vs ischemic demand   - ekg new TWI   - possible also from demand  - lovenox 75 mg daily x 2 doses for 48 hrs  - asa 181 mg, Lipitor  - hold Beta-Blocker therapy as per cardiology    #hyperna- encourage po intake - resolved       # Normocytic anemia   - monitor     # HTN   - Hold amlodipine and Losartan while on diuresis     # CKD stage 3a   - Baseline creatinine: 1.2 , 1.4 now   - Avoid nephrotoxic agents    #Progress Note Handoff  Pending (specify):  as above  Family discussion: house staff updated pt family  Disposition: SDU   Decision to admit the pt is based on acuity as above   Overall very poor prognosis

## 2024-01-02 NOTE — PROGRESS NOTE ADULT - ASSESSMENT
89yFemale with history of HTN, CKD, hypothyroidism presents with SOB and respiratory failure in the setting of Flu and elevated troponin.  Palliative care consulted for GOC.    Patient seen at bedside, now on nasal cannula. Denied pain or SOB at time of visit.      Education about palliative care provided to patient/family.  See Recs below.    Please call [phone] with questions or concerns 24/7.   We will continue to follow.    89yFemale with history of HTN, CKD, hypothyroidism presents with SOB and respiratory failure in the setting of Flu and elevated troponin.  Palliative care consulted for GOC.    Patient seen at bedside, now on nasal cannula. Denied pain or SOB at time of visit.      Education about palliative care provided to patient/family.  See Recs below.    Please call [phone] with questions or concerns 24/7.   We will continue to follow.

## 2024-01-02 NOTE — PROGRESS NOTE ADULT - NS ATTEST RISK PROBLEM GEN_ALL_CORE FT
ARF, pneumonia, bipap, sdu, dw cc, labs reviewed, ordered labs
[x ] 1 acute or chronic illnesses or injuries that may pose a threat to life or bodily function  [ x] Drug therapy requiring intensive monitoring for toxicity   [ x] Decision not to resuscitate, not to intubate, or to de-escalate care

## 2024-01-02 NOTE — PROGRESS NOTE ADULT - ASSESSMENT
Impression:    Acute hypoxic respiratory failure  Influenza A pneumonia  Possible aspiration pneumonia  Fluid overload  NSTEMI type 2   HFrEF        PLAN:    CNS: Avoid CNS depressants    HEENT: Oral care    PULMONARY:  HOB @ 45 degrees.  HHFNC,/ NIV wean as tolerated. Keep Sao2 92 to 96%, dec solumedrol to daily, CXR    CARDIOVASCULAR: diuresis as tolerated    GI: GI prophylaxis.  Feeding.  Bowel regimen     RENAL:  Follow up lytes.  Correct as needed    INFECTIOUS DISEASE: abx. tamiflu    HEMATOLOGICAL:  DVT prophylaxis. LE doppler -    ENDOCRINE:  Follow up FS.  Insulin protocol if needed.    MUSCULOSKELETAL: OOBTC    GOC/ poor prognosis    SDU

## 2024-01-02 NOTE — PROGRESS NOTE ADULT - SUBJECTIVE AND OBJECTIVE BOX
Patient is a 89y old  Female who presents with a chief complaint of shortness of breath (01-02-24)      Pt seen and examined at bedside. No CP or SOB.          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    H/O total knee replacement, bilateral    H/O carpal tunnel repair  B/L        VITAL SIGNS (Last 24 hrs):  T(C): 36.4 (01-02-24 @ 11:00), Max: 36.9 (01-01-24 @ 20:00)  HR: 75 (01-02-24 @ 11:00) (75 - 111)  BP: 118/55 (01-02-24 @ 11:00) (106/51 - 141/64)  RR: 35 (01-02-24 @ 11:00) (21 - 51)  SpO2: 100% (01-02-24 @ 11:00) (98% - 100%)  Wt(kg): --  Daily     Daily     I&O's Summary    01 Jan 2024 07:01  -  02 Jan 2024 07:00  --------------------------------------------------------  IN: 100 mL / OUT: 1250 mL / NET: -1150 mL        PHYSICAL EXAM:  GENERAL: NAD, elderly   HEAD:  Atraumatic, Normocephalic  EYES: EOMI, PERRLA, conjunctiva and sclera clear  NECK: Supple, No JVD  CHEST/LUNG: Clear to auscultation bilaterally; No wheeze  HEART: Regular rate and rhythm; No murmurs, rubs, or gallops  ABDOMEN: Soft, Nontender, Nondistended; Bowel sounds present  EXTREMITIES:  2+ Peripheral Pulses, No clubbing, cyanosis, or edema  PSYCH: AAOx3  NEUROLOGY: non-focal      Labs Reviewed  Spoke to patient in regards to abnormal labs.    CBC Full  -  ( 02 Jan 2024 04:55 )  WBC Count : 6.38 K/uL  Hemoglobin : 9.9 g/dL  Hematocrit : 29.9 %  Platelet Count - Automated : 127 K/uL  Mean Cell Volume : 91.7 fL  Mean Cell Hemoglobin : 30.4 pg  Mean Cell Hemoglobin Concentration : 33.1 g/dL  Auto Neutrophil # : 4.83 K/uL  Auto Lymphocyte # : 0.88 K/uL  Auto Monocyte # : 0.63 K/uL  Auto Eosinophil # : 0.00 K/uL  Auto Basophil # : 0.00 K/uL  Auto Neutrophil % : 75.7 %  Auto Lymphocyte % : 13.8 %  Auto Monocyte % : 9.9 %  Auto Eosinophil % : 0.0 %  Auto Basophil % : 0.0 %    BMP:    01-02 @ 04:55    Blood Urea Nitrogen - 60  Calcium - 8.9  Carbond Dioxide - 27  Chloride - 101  Creatinine - 1.2  Glucose - 149  Potassium - 4.0  Sodium - 139      Hemoglobin A1c -   PT/INR - ( 29 Dec 2023 14:34 )   PT: 11.70 sec;   INR: 1.03 ratio         PTT - ( 29 Dec 2023 14:34 )  PTT:29.1 sec  Urine Culture:        COVID Labs  CRP:    Procalcitonin, Serum: 0.18 ng/mL (12-28-23 @ 17:09)    D-Dimer:  1177 ng/mL DDU (12-28-23 @ 17:09)    Ferritin: 283 ng/mL (12-29-23 @ 06:45)          Imaging reviewed independently and reviewed read    < from: Xray Chest 1 View- PORTABLE-Urgent (Xray Chest 1 View- PORTABLE-Urgent .) (12.31.23 @ 10:36) >  IMPRESSION:    Unchanged bilateral parenchymal opacities.    < end of copied text >      MEDICATIONS  (STANDING):  albuterol/ipratropium for Nebulization 3 milliLiter(s) Nebulizer every 4 hours  aspirin enteric coated 81 milliGRAM(s) Oral daily  atorvastatin 40 milliGRAM(s) Oral at bedtime  cinacalcet 30 milliGRAM(s) Oral daily  enoxaparin Injectable 40 milliGRAM(s) SubCutaneous every 24 hours  furosemide   Injectable 20 milliGRAM(s) IV Push daily  melatonin 3 milliGRAM(s) Oral at bedtime  methylPREDNISolone sodium succinate Injectable 40 milliGRAM(s) IV Push every 24 hours  oseltamivir 30 milliGRAM(s) Oral two times a day  piperacillin/tazobactam IVPB.. 3.375 Gram(s) IV Intermittent every 12 hours  sodium chloride 3%  Inhalation 4 milliLiter(s) Inhalation every 12 hours    MEDICATIONS  (PRN):  acetaminophen     Tablet .. 650 milliGRAM(s) Oral every 6 hours PRN Moderate Pain (4 - 6)

## 2024-01-02 NOTE — PROGRESS NOTE ADULT - SUBJECTIVE AND OBJECTIVE BOX
HPI:  89-year-old female PMH HTN, ckd, op, hypothyroidism, hyperparathyroidism presenting with fever, URI symptoms, cough, and shortness of breath x 2 days.  Worsened overnight, lives with her sons who states that she was calling them throughout the night complaining that she felt like she could not breathe.  Febrile and hypoxic to 89% on room air on arrival, improved on 2 L nasal cannula.  Denies any headache, sore throat, CP, nausea vomiting diarrhea, abdominal pain, flank pain, urinary symptoms, rash.  Sons endorse recent sick contacts, family members with URI symptoms who she was with over the holidays.    Interval history  -Patient seen at bedside, now on nasal cannula  -Denied pain or SOB at time of visit     ADVANCE DIRECTIVES:     [ ] Full Code [ x] DNR  MOLST  [ ]  Living Will  [ ]   DECISION MAKER(s):  [x ] Health Care Proxy(s)  [ ] Surrogate(s)  [ ] Guardian           Name(s): Phone Number(s):  Bere Cooper      BASELINE (I)ADL(s) (prior to admission):    [place]: [ ]Total  [ ] Moderate [ ]Dependent  Palliative Performance Status Version 2:         %    http://npcrc.org/files/news/palliative_performance_scale_ppsv2.pdf    Allergies    No Known Allergies    Intolerances    MEDICATIONS  (STANDING):  albuterol/ipratropium for Nebulization 3 milliLiter(s) Nebulizer every 4 hours  aspirin enteric coated 81 milliGRAM(s) Oral daily  atorvastatin 40 milliGRAM(s) Oral at bedtime  cinacalcet 30 milliGRAM(s) Oral daily  enoxaparin Injectable 40 milliGRAM(s) SubCutaneous every 24 hours  furosemide   Injectable 20 milliGRAM(s) IV Push daily  melatonin 3 milliGRAM(s) Oral at bedtime  methylPREDNISolone sodium succinate Injectable 40 milliGRAM(s) IV Push every 24 hours  oseltamivir 30 milliGRAM(s) Oral two times a day  piperacillin/tazobactam IVPB.. 3.375 Gram(s) IV Intermittent every 12 hours  sodium chloride 3%  Inhalation 4 milliLiter(s) Inhalation every 12 hours    MEDICATIONS  (PRN):  acetaminophen     Tablet .. 650 milliGRAM(s) Oral every 6 hours PRN Moderate Pain (4 - 6)      PRESENT SYMPTOMS: [ ]Unable to obtain due to poor mentation   Source if other than patient:  [ ]Family   [ ]Team     Pain: [ ]yes [x ]no  QOL impact -   Location -                    Aggravating factors -  Quality -  Radiation -  Timing-  Severity (0-10 scale):  Minimal acceptable level (0-10 scale):     CPOT:    https://www.[facility].org/getattachment/[email or web address]/Critical-Care-Pain-Observation-Tool-(CPOT)    PAIN AD Score:   http://geriatrictoolkit.[facility]/cog/painad.pdf (press ctrl +  left click to view)    Dyspnea:                           [x ]None[ ]Mild [ ]Moderate [ ]Severe     Respiratory Distress Observation Scale (RDOS):   A score of 0 to 2 signifies little or no respiratory distress, 3 signifies mild distress, scores 4 to 6 indicate moderate distress, and scores greater than 7 signify severe distress  https://www.[facility].ca/sites/default/files/PDFS/897227-rutyvodhzsp-pxkxjjae-jqdhlavdtbg-nksgl.pdf    Anxiety:                             [ x]None[ ]Mild [ ]Moderate [ ]Severe   Fatigue:                             [x ]None[ ]Mild [ ]Moderate [ ]Severe   Nausea:                             [x ]None[ ]Mild [ ]Moderate [ ]Severe   Loss of appetite:              [x ]None[ ]Mild [ ]Moderate [ ]Severe   Constipation:                    [x ]None[ ]Mild [ ]Moderate [ ]Severe    Other Symptoms:  [x ]All other review of systems negative     Palliative Performance Status Version 2:         30%    http://[facility].org/files/news/palliative_performance_scale_ppsv2.pdf    PHYSICAL EXAM:  Vital Signs Last 24 Hrs  T(C): 36.4 (02 Jan 2024 11:00), Max: 36.9 (01 Jan 2024 20:00)  T(F): 97.6 (02 Jan 2024 11:00), Max: 98.4 (01 Jan 2024 20:00)  HR: 75 (02 Jan 2024 11:00) (75 - 111)  BP: 118/55 (02 Jan 2024 11:00) (106/51 - 141/64)  BP(mean): 79 (02 Jan 2024 11:00) (74 - 92)  RR: 35 (02 Jan 2024 11:00) (21 - 35)  SpO2: 100% (02 Jan 2024 11:00) (98% - 100%)    Parameters below as of 02 Jan 2024 11:00  Patient On (Oxygen Delivery Method): nasal cannula  O2 Flow (L/min): 4      GENERAL:  [ ] No acute distress [ ]Lethargic  [ ]Unarousable  [x ]Verbal  [ ]Non-Verbal [ ]Cachexia    BEHAVIORAL/PSYCH:  [x ]Alert and Oriented x2-3 [ ] Anxiety [ ] Delirium [ ] Agitation [x ] Calm   EYES: [ x] No scleral icterus [ ] Scleral icterus [ ] Closed  ENMT:  [ ]Dry mouth  [ ]No external oral lesions [ ] No external ear or nose lesions  CARDIOVASCULAR:  [ ]Regular [ ]Irregular [ ]Tachy [ ]Not Tachy  [ ]Cruz [ ] Edema [ x] No edema  PULMONARY:  [ ]Tachypnea  [ ]Audible excessive secretions [x ] No labored breathing [ ] labored breathing  GASTROINTESTINAL: [ ]Soft  [ ]Distended  [ ]Not distended [ ]Non tender [ ]Tender  MUSCULOSKELETAL: [ ]No clubbing [ ] clubbing  [x ] No cyanosis [ ] cyanosis  NEUROLOGIC: [ ]No focal deficits  [x ]Follows commands  [ ]Does not follow commands  [ ]Cognitive impairment  [ ]Dysphagia  [ ]Dysarthria  [ ]Paresis   SKIN: [ ] Jaundiced [ x] Non-jaundiced [ ]Rash [ ]No Rash [ ] Warm [ ] Dry  MISC/LINES: [ ] ET tube [ ] Trach [ ]NGT/OGT [ ]PEG [ ]Amaya    LABS: reviewed by me                                   9.9    6.38  )-----------( 127      ( 02 Jan 2024 04:55 )             29.9       01-02    139  |  101  |  60<H>  ----------------------------<  149<H>  4.0   |  27  |  1.2    Ca    8.9      02 Jan 2024 04:55  Mg     2.0     01-02    TPro  5.5<L>  /  Alb  3.2<L>  /  TBili  0.6  /  DBili  x   /  AST  22  /  ALT  15  /  AlkPhos  49  01-02              Urinalysis Basic - ( 02 Jan 2024 04:55 )    Color: x / Appearance: x / SG: x / pH: x  Gluc: 149 mg/dL / Ketone: x  / Bili: x / Urobili: x   Blood: x / Protein: x / Nitrite: x   Leuk Esterase: x / RBC: x / WBC x   Sq Epi: x / Non Sq Epi: x / Bacteria: x                  CAPILLARY BLOOD GLUCOSE      POCT Blood Glucose.: 153 mg/dL (31 Dec 2023 16:32)              RADIOLOGY & ADDITIONAL STUDIES: reviewed by me    < from: Xray Chest 1 View- PORTABLE-Urgent (Xray Chest 1 View- PORTABLE-Urgent .) (12.31.23 @ 10:36) >    IMPRESSION:    Unchanged bilateral parenchymal opacities.    < end of copied text >        EKG: reviewed by me    < from: 12 Lead ECG (12.28.23 @ 20:49) >  Ventricular Rate 110 BPM    Atrial Rate 110 BPM    P-R Interval 146 ms    QRS Duration 82 ms    Q-T Interval 340 ms    QTC Calculation(Bazett) 460 ms    P Axis 67 degrees    R Axis 97 degrees    T Axis 30 degrees    Diagnosis Line Sinus tachycardiawith Premature supraventricular complexes  Rightward axis  Anteroseptal infarct , age undetermined  Abnormal ECG    < end of copied text >        PROTEIN CALORIE MALNUTRITION PRESENT: [ ]mild [ ]moderate [ ]severe [ ]underweight [ ]morbid obesity  https://www.andeal.org/vault/2440/web/files/ONC/Table_Clinical%20Characteristics%20to%20Document%20Malnutrition-White%20JV%20et%20al%657290.pdf      Weight (kg): 74.8 (12-28-23 @ 05:10)  [ ]PPSV2 < or = to 30% [ ]significant weight loss  [ ]poor nutritional intake  [ ]anasarca      [ ]Artificial Nutrition      Palliative Care Spiritual/Emotional Screening Tool Question  Severity (0-4):                    OR                    [ x] Unable to determine. Will assess at later time if appropriate.  Score of 2 or greater indicates recommendation of Chaplaincy and/or SW referral  Chaplaincy Referral: [ ] Yes [ ] Refused [ ] Following     Caregiver [place]:  [ ] Yes [ ] No    OR    [x ] Unable to determine. Will assess at later time if appropriate.  Social Work Referral [ ]  Patient and Family Centered Care Referral [ ]    Anticipatory Grief Present: [ ] Yes [ ] No    OR     [ x] Unable to determine. Will assess at later time if appropriate.  Social Work Referral [ ]  Patient and Family Centered Care Referral [ ]    Patient discussed with primary medical team MD  Palliative care education provided to patient and/or family   HPI:  89-year-old female PMH HTN, ckd, op, hypothyroidism, hyperparathyroidism presenting with fever, URI symptoms, cough, and shortness of breath x 2 days.  Worsened overnight, lives with her sons who states that she was calling them throughout the night complaining that she felt like she could not breathe.  Febrile and hypoxic to 89% on room air on arrival, improved on 2 L nasal cannula.  Denies any headache, sore throat, CP, nausea vomiting diarrhea, abdominal pain, flank pain, urinary symptoms, rash.  Sons endorse recent sick contacts, family members with URI symptoms who she was with over the holidays.    Interval history  -Patient seen at bedside, now on nasal cannula  -Denied pain or SOB at time of visit     ADVANCE DIRECTIVES:     [ ] Full Code [ x] DNR  MOLST  [ ]  Living Will  [ ]   DECISION MAKER(s):  [x ] Health Care Proxy(s)  [ ] Surrogate(s)  [ ] Guardian           Name(s): Phone Number(s):  Bere Cooper      BASELINE (I)ADL(s) (prior to admission):    [place]: [ ]Total  [ ] Moderate [ ]Dependent  Palliative Performance Status Version 2:         %    http://npcrc.org/files/news/palliative_performance_scale_ppsv2.pdf    Allergies    No Known Allergies    Intolerances    MEDICATIONS  (STANDING):  albuterol/ipratropium for Nebulization 3 milliLiter(s) Nebulizer every 4 hours  aspirin enteric coated 81 milliGRAM(s) Oral daily  atorvastatin 40 milliGRAM(s) Oral at bedtime  cinacalcet 30 milliGRAM(s) Oral daily  enoxaparin Injectable 40 milliGRAM(s) SubCutaneous every 24 hours  furosemide   Injectable 20 milliGRAM(s) IV Push daily  melatonin 3 milliGRAM(s) Oral at bedtime  methylPREDNISolone sodium succinate Injectable 40 milliGRAM(s) IV Push every 24 hours  oseltamivir 30 milliGRAM(s) Oral two times a day  piperacillin/tazobactam IVPB.. 3.375 Gram(s) IV Intermittent every 12 hours  sodium chloride 3%  Inhalation 4 milliLiter(s) Inhalation every 12 hours    MEDICATIONS  (PRN):  acetaminophen     Tablet .. 650 milliGRAM(s) Oral every 6 hours PRN Moderate Pain (4 - 6)      PRESENT SYMPTOMS: [ ]Unable to obtain due to poor mentation   Source if other than patient:  [ ]Family   [ ]Team     Pain: [ ]yes [x ]no  QOL impact -   Location -                    Aggravating factors -  Quality -  Radiation -  Timing-  Severity (0-10 scale):  Minimal acceptable level (0-10 scale):     CPOT:    https://www.[facility].org/getattachment/[email or web address]/Critical-Care-Pain-Observation-Tool-(CPOT)    PAIN AD Score:   http://geriatrictoolkit.[facility]/cog/painad.pdf (press ctrl +  left click to view)    Dyspnea:                           [x ]None[ ]Mild [ ]Moderate [ ]Severe     Respiratory Distress Observation Scale (RDOS):   A score of 0 to 2 signifies little or no respiratory distress, 3 signifies mild distress, scores 4 to 6 indicate moderate distress, and scores greater than 7 signify severe distress  https://www.[facility].ca/sites/default/files/PDFS/096414-krqkjavvzbm-jicbneeo-yaarjrtekdv-rupte.pdf    Anxiety:                             [ x]None[ ]Mild [ ]Moderate [ ]Severe   Fatigue:                             [x ]None[ ]Mild [ ]Moderate [ ]Severe   Nausea:                             [x ]None[ ]Mild [ ]Moderate [ ]Severe   Loss of appetite:              [x ]None[ ]Mild [ ]Moderate [ ]Severe   Constipation:                    [x ]None[ ]Mild [ ]Moderate [ ]Severe    Other Symptoms:  [x ]All other review of systems negative     Palliative Performance Status Version 2:         30%    http://[facility].org/files/news/palliative_performance_scale_ppsv2.pdf    PHYSICAL EXAM:  Vital Signs Last 24 Hrs  T(C): 36.4 (02 Jan 2024 11:00), Max: 36.9 (01 Jan 2024 20:00)  T(F): 97.6 (02 Jan 2024 11:00), Max: 98.4 (01 Jan 2024 20:00)  HR: 75 (02 Jan 2024 11:00) (75 - 111)  BP: 118/55 (02 Jan 2024 11:00) (106/51 - 141/64)  BP(mean): 79 (02 Jan 2024 11:00) (74 - 92)  RR: 35 (02 Jan 2024 11:00) (21 - 35)  SpO2: 100% (02 Jan 2024 11:00) (98% - 100%)    Parameters below as of 02 Jan 2024 11:00  Patient On (Oxygen Delivery Method): nasal cannula  O2 Flow (L/min): 4      GENERAL:  [ ] No acute distress [ ]Lethargic  [ ]Unarousable  [x ]Verbal  [ ]Non-Verbal [ ]Cachexia    BEHAVIORAL/PSYCH:  [x ]Alert and Oriented x2-3 [ ] Anxiety [ ] Delirium [ ] Agitation [x ] Calm   EYES: [ x] No scleral icterus [ ] Scleral icterus [ ] Closed  ENMT:  [ ]Dry mouth  [ ]No external oral lesions [ ] No external ear or nose lesions  CARDIOVASCULAR:  [ ]Regular [ ]Irregular [ ]Tachy [ ]Not Tachy  [ ]Cruz [ ] Edema [ x] No edema  PULMONARY:  [ ]Tachypnea  [ ]Audible excessive secretions [x ] No labored breathing [ ] labored breathing  GASTROINTESTINAL: [ ]Soft  [ ]Distended  [ ]Not distended [ ]Non tender [ ]Tender  MUSCULOSKELETAL: [ ]No clubbing [ ] clubbing  [x ] No cyanosis [ ] cyanosis  NEUROLOGIC: [ ]No focal deficits  [x ]Follows commands  [ ]Does not follow commands  [ ]Cognitive impairment  [ ]Dysphagia  [ ]Dysarthria  [ ]Paresis   SKIN: [ ] Jaundiced [ x] Non-jaundiced [ ]Rash [ ]No Rash [ ] Warm [ ] Dry  MISC/LINES: [ ] ET tube [ ] Trach [ ]NGT/OGT [ ]PEG [ ]Amaya    LABS: reviewed by me                                   9.9    6.38  )-----------( 127      ( 02 Jan 2024 04:55 )             29.9       01-02    139  |  101  |  60<H>  ----------------------------<  149<H>  4.0   |  27  |  1.2    Ca    8.9      02 Jan 2024 04:55  Mg     2.0     01-02    TPro  5.5<L>  /  Alb  3.2<L>  /  TBili  0.6  /  DBili  x   /  AST  22  /  ALT  15  /  AlkPhos  49  01-02              Urinalysis Basic - ( 02 Jan 2024 04:55 )    Color: x / Appearance: x / SG: x / pH: x  Gluc: 149 mg/dL / Ketone: x  / Bili: x / Urobili: x   Blood: x / Protein: x / Nitrite: x   Leuk Esterase: x / RBC: x / WBC x   Sq Epi: x / Non Sq Epi: x / Bacteria: x                  CAPILLARY BLOOD GLUCOSE      POCT Blood Glucose.: 153 mg/dL (31 Dec 2023 16:32)              RADIOLOGY & ADDITIONAL STUDIES: reviewed by me    < from: Xray Chest 1 View- PORTABLE-Urgent (Xray Chest 1 View- PORTABLE-Urgent .) (12.31.23 @ 10:36) >    IMPRESSION:    Unchanged bilateral parenchymal opacities.    < end of copied text >        EKG: reviewed by me    < from: 12 Lead ECG (12.28.23 @ 20:49) >  Ventricular Rate 110 BPM    Atrial Rate 110 BPM    P-R Interval 146 ms    QRS Duration 82 ms    Q-T Interval 340 ms    QTC Calculation(Bazett) 460 ms    P Axis 67 degrees    R Axis 97 degrees    T Axis 30 degrees    Diagnosis Line Sinus tachycardiawith Premature supraventricular complexes  Rightward axis  Anteroseptal infarct , age undetermined  Abnormal ECG    < end of copied text >        PROTEIN CALORIE MALNUTRITION PRESENT: [ ]mild [ ]moderate [ ]severe [ ]underweight [ ]morbid obesity  https://www.andeal.org/vault/2440/web/files/ONC/Table_Clinical%20Characteristics%20to%20Document%20Malnutrition-White%20JV%20et%20al%135015.pdf      Weight (kg): 74.8 (12-28-23 @ 05:10)  [ ]PPSV2 < or = to 30% [ ]significant weight loss  [ ]poor nutritional intake  [ ]anasarca      [ ]Artificial Nutrition      Palliative Care Spiritual/Emotional Screening Tool Question  Severity (0-4):                    OR                    [ x] Unable to determine. Will assess at later time if appropriate.  Score of 2 or greater indicates recommendation of Chaplaincy and/or SW referral  Chaplaincy Referral: [ ] Yes [ ] Refused [ ] Following     Caregiver [place]:  [ ] Yes [ ] No    OR    [x ] Unable to determine. Will assess at later time if appropriate.  Social Work Referral [ ]  Patient and Family Centered Care Referral [ ]    Anticipatory Grief Present: [ ] Yes [ ] No    OR     [ x] Unable to determine. Will assess at later time if appropriate.  Social Work Referral [ ]  Patient and Family Centered Care Referral [ ]    Patient discussed with primary medical team MD  Palliative care education provided to patient and/or family

## 2024-01-03 DIAGNOSIS — R74.8 ABNORMAL LEVELS OF OTHER SERUM ENZYMES: ICD-10-CM

## 2024-01-03 DIAGNOSIS — A41.9 SEPSIS, UNSPECIFIED ORGANISM: ICD-10-CM

## 2024-01-03 DIAGNOSIS — Z79.899 OTHER LONG TERM (CURRENT) DRUG THERAPY: ICD-10-CM

## 2024-01-03 DIAGNOSIS — Z86.39 PERSONAL HISTORY OF OTHER ENDOCRINE, NUTRITIONAL AND METABOLIC DISEASE: ICD-10-CM

## 2024-01-03 DIAGNOSIS — I10 ESSENTIAL (PRIMARY) HYPERTENSION: ICD-10-CM

## 2024-01-03 DIAGNOSIS — E03.9 HYPOTHYROIDISM, UNSPECIFIED: ICD-10-CM

## 2024-01-03 LAB
ALBUMIN SERPL ELPH-MCNC: 3.1 G/DL — LOW (ref 3.5–5.2)
ALBUMIN SERPL ELPH-MCNC: 3.1 G/DL — LOW (ref 3.5–5.2)
ALP SERPL-CCNC: 50 U/L — SIGNIFICANT CHANGE UP (ref 30–115)
ALP SERPL-CCNC: 50 U/L — SIGNIFICANT CHANGE UP (ref 30–115)
ALT FLD-CCNC: 16 U/L — SIGNIFICANT CHANGE UP (ref 0–41)
ALT FLD-CCNC: 16 U/L — SIGNIFICANT CHANGE UP (ref 0–41)
ANION GAP SERPL CALC-SCNC: 10 MMOL/L — SIGNIFICANT CHANGE UP (ref 7–14)
ANION GAP SERPL CALC-SCNC: 10 MMOL/L — SIGNIFICANT CHANGE UP (ref 7–14)
AST SERPL-CCNC: 21 U/L — SIGNIFICANT CHANGE UP (ref 0–41)
AST SERPL-CCNC: 21 U/L — SIGNIFICANT CHANGE UP (ref 0–41)
BASOPHILS # BLD AUTO: 0.01 K/UL — SIGNIFICANT CHANGE UP (ref 0–0.2)
BASOPHILS # BLD AUTO: 0.01 K/UL — SIGNIFICANT CHANGE UP (ref 0–0.2)
BASOPHILS NFR BLD AUTO: 0.1 % — SIGNIFICANT CHANGE UP (ref 0–1)
BASOPHILS NFR BLD AUTO: 0.1 % — SIGNIFICANT CHANGE UP (ref 0–1)
BILIRUB SERPL-MCNC: 0.7 MG/DL — SIGNIFICANT CHANGE UP (ref 0.2–1.2)
BILIRUB SERPL-MCNC: 0.7 MG/DL — SIGNIFICANT CHANGE UP (ref 0.2–1.2)
BUN SERPL-MCNC: 51 MG/DL — HIGH (ref 10–20)
BUN SERPL-MCNC: 51 MG/DL — HIGH (ref 10–20)
CALCIUM SERPL-MCNC: 8.9 MG/DL — SIGNIFICANT CHANGE UP (ref 8.4–10.5)
CALCIUM SERPL-MCNC: 8.9 MG/DL — SIGNIFICANT CHANGE UP (ref 8.4–10.5)
CHLORIDE SERPL-SCNC: 101 MMOL/L — SIGNIFICANT CHANGE UP (ref 98–110)
CHLORIDE SERPL-SCNC: 101 MMOL/L — SIGNIFICANT CHANGE UP (ref 98–110)
CO2 SERPL-SCNC: 28 MMOL/L — SIGNIFICANT CHANGE UP (ref 17–32)
CO2 SERPL-SCNC: 28 MMOL/L — SIGNIFICANT CHANGE UP (ref 17–32)
CREAT SERPL-MCNC: 1.3 MG/DL — SIGNIFICANT CHANGE UP (ref 0.7–1.5)
CREAT SERPL-MCNC: 1.3 MG/DL — SIGNIFICANT CHANGE UP (ref 0.7–1.5)
EGFR: 37 ML/MIN/1.73M2 — LOW
EGFR: 37 ML/MIN/1.73M2 — LOW
EOSINOPHIL # BLD AUTO: 0 K/UL — SIGNIFICANT CHANGE UP (ref 0–0.7)
EOSINOPHIL # BLD AUTO: 0 K/UL — SIGNIFICANT CHANGE UP (ref 0–0.7)
EOSINOPHIL NFR BLD AUTO: 0 % — SIGNIFICANT CHANGE UP (ref 0–8)
EOSINOPHIL NFR BLD AUTO: 0 % — SIGNIFICANT CHANGE UP (ref 0–8)
GLUCOSE SERPL-MCNC: 136 MG/DL — HIGH (ref 70–99)
GLUCOSE SERPL-MCNC: 136 MG/DL — HIGH (ref 70–99)
HCT VFR BLD CALC: 29.3 % — LOW (ref 37–47)
HCT VFR BLD CALC: 29.3 % — LOW (ref 37–47)
HGB BLD-MCNC: 9.8 G/DL — LOW (ref 12–16)
HGB BLD-MCNC: 9.8 G/DL — LOW (ref 12–16)
IMM GRANULOCYTES NFR BLD AUTO: 0.4 % — HIGH (ref 0.1–0.3)
IMM GRANULOCYTES NFR BLD AUTO: 0.4 % — HIGH (ref 0.1–0.3)
LYMPHOCYTES # BLD AUTO: 0.88 K/UL — LOW (ref 1.2–3.4)
LYMPHOCYTES # BLD AUTO: 0.88 K/UL — LOW (ref 1.2–3.4)
LYMPHOCYTES # BLD AUTO: 11.2 % — LOW (ref 20.5–51.1)
LYMPHOCYTES # BLD AUTO: 11.2 % — LOW (ref 20.5–51.1)
MAGNESIUM SERPL-MCNC: 2 MG/DL — SIGNIFICANT CHANGE UP (ref 1.8–2.4)
MAGNESIUM SERPL-MCNC: 2 MG/DL — SIGNIFICANT CHANGE UP (ref 1.8–2.4)
MCHC RBC-ENTMCNC: 30.5 PG — SIGNIFICANT CHANGE UP (ref 27–31)
MCHC RBC-ENTMCNC: 30.5 PG — SIGNIFICANT CHANGE UP (ref 27–31)
MCHC RBC-ENTMCNC: 33.4 G/DL — SIGNIFICANT CHANGE UP (ref 32–37)
MCHC RBC-ENTMCNC: 33.4 G/DL — SIGNIFICANT CHANGE UP (ref 32–37)
MCV RBC AUTO: 91.3 FL — SIGNIFICANT CHANGE UP (ref 81–99)
MCV RBC AUTO: 91.3 FL — SIGNIFICANT CHANGE UP (ref 81–99)
MONOCYTES # BLD AUTO: 0.75 K/UL — HIGH (ref 0.1–0.6)
MONOCYTES # BLD AUTO: 0.75 K/UL — HIGH (ref 0.1–0.6)
MONOCYTES NFR BLD AUTO: 9.5 % — HIGH (ref 1.7–9.3)
MONOCYTES NFR BLD AUTO: 9.5 % — HIGH (ref 1.7–9.3)
NEUTROPHILS # BLD AUTO: 6.22 K/UL — SIGNIFICANT CHANGE UP (ref 1.4–6.5)
NEUTROPHILS # BLD AUTO: 6.22 K/UL — SIGNIFICANT CHANGE UP (ref 1.4–6.5)
NEUTROPHILS NFR BLD AUTO: 78.8 % — HIGH (ref 42.2–75.2)
NEUTROPHILS NFR BLD AUTO: 78.8 % — HIGH (ref 42.2–75.2)
NRBC # BLD: 0 /100 WBCS — SIGNIFICANT CHANGE UP (ref 0–0)
NRBC # BLD: 0 /100 WBCS — SIGNIFICANT CHANGE UP (ref 0–0)
PLATELET # BLD AUTO: 148 K/UL — SIGNIFICANT CHANGE UP (ref 130–400)
PLATELET # BLD AUTO: 148 K/UL — SIGNIFICANT CHANGE UP (ref 130–400)
PMV BLD: 12.3 FL — HIGH (ref 7.4–10.4)
PMV BLD: 12.3 FL — HIGH (ref 7.4–10.4)
POTASSIUM SERPL-MCNC: 4.2 MMOL/L — SIGNIFICANT CHANGE UP (ref 3.5–5)
POTASSIUM SERPL-MCNC: 4.2 MMOL/L — SIGNIFICANT CHANGE UP (ref 3.5–5)
POTASSIUM SERPL-SCNC: 4.2 MMOL/L — SIGNIFICANT CHANGE UP (ref 3.5–5)
POTASSIUM SERPL-SCNC: 4.2 MMOL/L — SIGNIFICANT CHANGE UP (ref 3.5–5)
PROT SERPL-MCNC: 5.4 G/DL — LOW (ref 6–8)
PROT SERPL-MCNC: 5.4 G/DL — LOW (ref 6–8)
RBC # BLD: 3.21 M/UL — LOW (ref 4.2–5.4)
RBC # BLD: 3.21 M/UL — LOW (ref 4.2–5.4)
RBC # FLD: 11.6 % — SIGNIFICANT CHANGE UP (ref 11.5–14.5)
RBC # FLD: 11.6 % — SIGNIFICANT CHANGE UP (ref 11.5–14.5)
SODIUM SERPL-SCNC: 139 MMOL/L — SIGNIFICANT CHANGE UP (ref 135–146)
SODIUM SERPL-SCNC: 139 MMOL/L — SIGNIFICANT CHANGE UP (ref 135–146)
WBC # BLD: 7.89 K/UL — SIGNIFICANT CHANGE UP (ref 4.8–10.8)
WBC # BLD: 7.89 K/UL — SIGNIFICANT CHANGE UP (ref 4.8–10.8)
WBC # FLD AUTO: 7.89 K/UL — SIGNIFICANT CHANGE UP (ref 4.8–10.8)
WBC # FLD AUTO: 7.89 K/UL — SIGNIFICANT CHANGE UP (ref 4.8–10.8)

## 2024-01-03 PROCEDURE — 71045 X-RAY EXAM CHEST 1 VIEW: CPT | Mod: 26

## 2024-01-03 PROCEDURE — 99233 SBSQ HOSP IP/OBS HIGH 50: CPT

## 2024-01-03 RX ORDER — CHLORHEXIDINE GLUCONATE 213 G/1000ML
1 SOLUTION TOPICAL
Refills: 0 | Status: DISCONTINUED | OUTPATIENT
Start: 2024-01-03 | End: 2024-01-09

## 2024-01-03 RX ADMIN — ENOXAPARIN SODIUM 40 MILLIGRAM(S): 100 INJECTION SUBCUTANEOUS at 12:20

## 2024-01-03 RX ADMIN — Medication 3 MILLILITER(S): at 14:34

## 2024-01-03 RX ADMIN — Medication 30 MILLIGRAM(S): at 06:47

## 2024-01-03 RX ADMIN — ATORVASTATIN CALCIUM 40 MILLIGRAM(S): 80 TABLET, FILM COATED ORAL at 22:18

## 2024-01-03 RX ADMIN — PIPERACILLIN AND TAZOBACTAM 25 GRAM(S): 4; .5 INJECTION, POWDER, LYOPHILIZED, FOR SOLUTION INTRAVENOUS at 19:18

## 2024-01-03 RX ADMIN — Medication 20 MILLIGRAM(S): at 06:48

## 2024-01-03 RX ADMIN — Medication 3 MILLILITER(S): at 01:31

## 2024-01-03 RX ADMIN — Medication 40 MILLIGRAM(S): at 12:20

## 2024-01-03 RX ADMIN — Medication 81 MILLIGRAM(S): at 12:20

## 2024-01-03 RX ADMIN — PIPERACILLIN AND TAZOBACTAM 25 GRAM(S): 4; .5 INJECTION, POWDER, LYOPHILIZED, FOR SOLUTION INTRAVENOUS at 06:47

## 2024-01-03 RX ADMIN — CINACALCET 30 MILLIGRAM(S): 30 TABLET, FILM COATED ORAL at 12:20

## 2024-01-03 RX ADMIN — Medication 30 MILLIGRAM(S): at 19:18

## 2024-01-03 RX ADMIN — Medication 3 MILLIGRAM(S): at 22:18

## 2024-01-03 NOTE — PROGRESS NOTE ADULT - ASSESSMENT
89F PMHx HTN, hypothyroidism, hyperparathyroidism here with sepsis, present on admission, with acute hypoxic resp failure, found to have influenza a. Elevated cardiac enzymes.

## 2024-01-03 NOTE — CHART NOTE - NSCHARTNOTEFT_GEN_A_CORE
SDU Transfer Note    Transfer from: SDU    Transfer to: (X) Medicine    (  ) Telemetry    (  ) RCU                               (  ) Palliative    (  ) Stroke Unit    (  ) MICU    (  ) __________________      HPI / Hospital Course     89y F pmh htn, ckd, osteoporosis, hypothyroidism, hyperparathyroidism presenting w/ fever, cough and SOB x 2days. patient lives w/ son who states she was complaining she couldn't;t breath. On admission febrile 100.5, tachypneic 28, tachycardic 117 and hypoxic 89% on RA improved on 2L NC. CXR b/l interstitial opacities and biapical thickening. Patient admitted for AHRF iso influenza pneumonia   - Patient septic on admission, started on tamiflu and IV diuretics  - Patient had elevated d-dimer and aspiration prior to admission  - developed worsening oxygenation status requiring BiPAP  - started on IV abx for suspected superimposed bacterial PNA   - patient had elevated troponins consistent w/ NSTEMI, EKG showed new TWI, cardiology consulted given lovenox 75mg x2 doses, ASA and lipitor, BB held   - patient weaned off BiPAP->HFNC->3L NC   - Creatinine near baseline of 1.2  - medically stable for DGTF   - plan follows below       Vital Signs Last 24 Hrs  T(C): 36.2 (03 Jan 2024 07:25), Max: 36.5 (02 Jan 2024 16:00)  T(F): 97.2 (03 Jan 2024 07:25), Max: 97.7 (02 Jan 2024 16:00)  HR: 77 (03 Jan 2024 08:00) (75 - 94)  BP: 133/63 (03 Jan 2024 08:00) (110/56 - 133/63)  BP(mean): 90 (03 Jan 2024 08:00) (74 - 90)  RR: 20 (03 Jan 2024 08:00) (20 - 35)  SpO2: 100% (03 Jan 2024 08:00) (98% - 100%)    Parameters below as of 03 Jan 2024 08:00  Patient On (Oxygen Delivery Method): nasal cannula  O2 Flow (L/min): 4      I&O's Summary    02 Jan 2024 07:01  -  03 Jan 2024 07:00  --------------------------------------------------------  IN: 2120 mL / OUT: 1870 mL / NET: 250 mL        Physical Exam:     PHYSICAL EXAM:  GEN: NAD, Resting comfortably in bed, cooperative  PULM: Clear to auscultation bilaterally, No wheezing, rales, rhonchi, 3L NC   CVS: Regular rate and rhythm, S1-S2, no murmurs, heaves, thrills  ABD: Soft, non-tender, non-distended, no guarding, BS +  EXT: No edema/cyanosis, extremities warm/dry, peripheral pulses palpable   NEURO: A&Ox3, no focal deficits, follows commands, answers appropriately         LABS:                               9.8    7.89  )-----------( 148      ( 03 Jan 2024 04:39 )             29.3       01-03    139  |  101  |  51<H>  ----------------------------<  136<H>  4.2   |  28  |  1.3    Ca    8.9      03 Jan 2024 04:39  Mg     2.0     01-03    TPro  5.4<L>  /  Alb  3.1<L>  /  TBili  0.7  /  DBili  x   /  AST  21  /  ALT  16  /  AlkPhos  50  01-03        ASSESSMENT & PLAN:    89y F pmh htn, CKD 3a, osteoporosis, hypothyroidism, hyperparathyroidism presenting w/ fever, sob admitted for acute hypoxic respiratory failure iso influenza pneumonia    #Acute hypoxic respiratory failure  #Influenza pneumonia  #Aspiration, suspected superimposed bacterial pneumonia   #Sepsis on admission   - CXR b/l interstitial opacities - improving   - Pt weaned from BiPAP->HFNC->3L NC   - d-dimer elevated, duplex - ve DVT  - BNP elevated - c/w lasix 20mg IV qD - can likely switch to po  - c/w zosyn 3.375g q12 - end 1/6  - c/w tamiflu 30mg BID - D6 (renal dose)  - lactate wnl    #NSTEMI  - elevated troponin + TWI new on EKG, suspected demand ischemia  - cardiology following - recommended asa 81 and lipitor   - advised against heparin iso elevated bleeding risk, decision made to start lovenox 75mg qD for NSTEMI - now on [id number]   - holding BB per cardio    #HTN   - holding home BP meds losartan/amlodipine while on diuretics    #CKD 3a  #Normocytic anemia   #Hyperparathyroidism   - at bsl Cr 1.2  - adjust meds as needed  - monitor hgb   - c/w cinacalcet 30mg qD    # To follow up  - wean O2, PT/OT    # Misc  - DVT Prophylaxis: enoxaparin Injectable  - GI Prophylaxis: none  - Diet: Diet, Regular  - Activity: Activity - Ambulate as Tolerated  - Code Status: Do Not Resuscitate      Torres Singh  PGY2, Internal Medicine   [facility] SDU Transfer Note    Transfer from: SDU    Transfer to: (X) Medicine    (  ) Telemetry    (  ) RCU                               (  ) Palliative    (  ) Stroke Unit    (  ) MICU    (  ) __________________      HPI / Hospital Course     89y F pmh htn, ckd, osteoporosis, hypothyroidism, hyperparathyroidism presenting w/ fever, cough and SOB x 2days. patient lives w/ son who states she was complaining she couldn't;t breath. On admission febrile 100.5, tachypneic 28, tachycardic 117 and hypoxic 89% on RA improved on 2L NC. CXR b/l interstitial opacities and biapical thickening. Patient admitted for AHRF iso influenza pneumonia   - Patient septic on admission, started on tamiflu and IV diuretics  - Patient had elevated d-dimer and aspiration prior to admission  - developed worsening oxygenation status requiring BiPAP  - started on IV abx for suspected superimposed bacterial PNA   - patient had elevated troponins consistent w/ NSTEMI, EKG showed new TWI, cardiology consulted given lovenox 75mg x2 doses, ASA and lipitor, BB held   - patient weaned off BiPAP->HFNC->3L NC   - Creatinine near baseline of 1.2  - medically stable for DGTF   - plan follows below       Vital Signs Last 24 Hrs  T(C): 36.2 (03 Jan 2024 07:25), Max: 36.5 (02 Jan 2024 16:00)  T(F): 97.2 (03 Jan 2024 07:25), Max: 97.7 (02 Jan 2024 16:00)  HR: 77 (03 Jan 2024 08:00) (75 - 94)  BP: 133/63 (03 Jan 2024 08:00) (110/56 - 133/63)  BP(mean): 90 (03 Jan 2024 08:00) (74 - 90)  RR: 20 (03 Jan 2024 08:00) (20 - 35)  SpO2: 100% (03 Jan 2024 08:00) (98% - 100%)    Parameters below as of 03 Jan 2024 08:00  Patient On (Oxygen Delivery Method): nasal cannula  O2 Flow (L/min): 4      I&O's Summary    02 Jan 2024 07:01  -  03 Jan 2024 07:00  --------------------------------------------------------  IN: 2120 mL / OUT: 1870 mL / NET: 250 mL        Physical Exam:     PHYSICAL EXAM:  GEN: NAD, Resting comfortably in bed, cooperative  PULM: Clear to auscultation bilaterally, No wheezing, rales, rhonchi, 3L NC   CVS: Regular rate and rhythm, S1-S2, no murmurs, heaves, thrills  ABD: Soft, non-tender, non-distended, no guarding, BS +  EXT: No edema/cyanosis, extremities warm/dry, peripheral pulses palpable   NEURO: A&Ox3, no focal deficits, follows commands, answers appropriately         LABS:                               9.8    7.89  )-----------( 148      ( 03 Jan 2024 04:39 )             29.3       01-03    139  |  101  |  51<H>  ----------------------------<  136<H>  4.2   |  28  |  1.3    Ca    8.9      03 Jan 2024 04:39  Mg     2.0     01-03    TPro  5.4<L>  /  Alb  3.1<L>  /  TBili  0.7  /  DBili  x   /  AST  21  /  ALT  16  /  AlkPhos  50  01-03        ASSESSMENT & PLAN:    89y F pmh htn, CKD 3a, osteoporosis, hypothyroidism, hyperparathyroidism presenting w/ fever, sob admitted for acute hypoxic respiratory failure iso influenza pneumonia    #Acute hypoxic respiratory failure  #Influenza pneumonia  #Aspiration, suspected superimposed bacterial pneumonia   #Sepsis on admission   - CXR b/l interstitial opacities - improving   - Pt weaned from BiPAP->HFNC->3L NC   - d-dimer elevated, duplex - ve DVT  - BNP elevated - c/w lasix 20mg IV qD - can likely switch to po  - c/w zosyn 3.375g q12 - end 1/6  - c/w tamiflu 30mg BID - D6 (renal dose)  - lactate wnl    #NSTEMI  - elevated troponin + TWI new on EKG, suspected demand ischemia  - cardiology following - recommended asa 81 and lipitor   - advised against heparin iso elevated bleeding risk, decision made to start lovenox 75mg qD for NSTEMI - now on [id number]   - holding BB per cardio    #HTN   - holding home BP meds losartan/amlodipine while on diuretics    #CKD 3a  #Normocytic anemia   #Hyperparathyroidism   - at bsl Cr 1.2  - adjust meds as needed  - monitor hgb   - c/w cinacalcet 30mg qD    # To follow up  - wean O2, PT/OT    # Misc  - DVT Prophylaxis: enoxaparin Injectable  - GI Prophylaxis: none  - Diet: Diet, Regular  - Activity: Activity - Ambulate as Tolerated  - Code Status: Do Not Resuscitate      Torres Singh  PGY2, Internal Medicine   [facility]

## 2024-01-03 NOTE — PROGRESS NOTE ADULT - SUBJECTIVE AND OBJECTIVE BOX
Over Night Events: events noted on NC, afebrile, palliative noted    PHYSICAL EXAM    ICU Vital Signs Last 24 Hrs  T(C): 36.3 (03 Jan 2024 04:00), Max: 36.5 (02 Jan 2024 16:00)  T(F): 97.3 (03 Jan 2024 04:00), Max: 97.7 (02 Jan 2024 16:00)  HR: 82 (03 Jan 2024 04:00) (75 - 94)  BP: 132/61 (03 Jan 2024 04:00) (110/56 - 141/64)  BP(mean): 88 (03 Jan 2024 04:00) (74 - 92)  RR: 21 (03 Jan 2024 04:00) (21 - 35)  SpO2: 100% (03 Jan 2024 04:00) (98% - 100%)    O2 Parameters below as of 03 Jan 2024 04:00  Patient On (Oxygen Delivery Method): nasal cannula  O2 Flow (L/min): 4          General: ill looking  Lungs: dec bs both bases  Cardiovascular: Regular   Abdomen: Soft, Positive BS  Extremities: No clubbing   Skin: Warm  Neurological: Non focal       01-02-24 @ 07:01  -  01-03-24 @ 07:00  --------------------------------------------------------  IN:    IV PiggyBack: 100 mL    Oral Fluid: 1920 mL  Total IN: 2020 mL    OUT:    Stool (mL): 400 mL    Voided (mL): 1020 mL  Total OUT: 1420 mL    Total NET: 600 mL          LABS:                          9.8    7.89  )-----------( 148      ( 03 Jan 2024 04:39 )             29.3                                               01-03    139  |  101  |  51<H>  ----------------------------<  136<H>  4.2   |  28  |  1.3    Ca    8.9      03 Jan 2024 04:39  Mg     2.0     01-03    TPro  5.4<L>  /  Alb  3.1<L>  /  TBili  0.7  /  DBili  x   /  AST  21  /  ALT  16  /  AlkPhos  50  01-03                                             Urinalysis Basic - ( 03 Jan 2024 04:39 )    Color: x / Appearance: x / SG: x / pH: x  Gluc: 136 mg/dL / Ketone: x  / Bili: x / Urobili: x   Blood: x / Protein: x / Nitrite: x   Leuk Esterase: x / RBC: x / WBC x   Sq Epi: x / Non Sq Epi: x / Bacteria: x                                                  LIVER FUNCTIONS - ( 03 Jan 2024 04:39 )  Alb: 3.1 g/dL / Pro: 5.4 g/dL / ALK PHOS: 50 U/L / ALT: 16 U/L / AST: 21 U/L / GGT: x                                                                                                                                       MEDICATIONS  (STANDING):  albuterol/ipratropium for Nebulization 3 milliLiter(s) Nebulizer every 4 hours  aspirin enteric coated 81 milliGRAM(s) Oral daily  atorvastatin 40 milliGRAM(s) Oral at bedtime  cinacalcet 30 milliGRAM(s) Oral daily  enoxaparin Injectable 40 milliGRAM(s) SubCutaneous every 24 hours  furosemide   Injectable 20 milliGRAM(s) IV Push daily  melatonin 3 milliGRAM(s) Oral at bedtime  methylPREDNISolone sodium succinate Injectable 40 milliGRAM(s) IV Push every 24 hours  oseltamivir 30 milliGRAM(s) Oral two times a day  piperacillin/tazobactam IVPB.. 3.375 Gram(s) IV Intermittent every 12 hours  sodium chloride 3%  Inhalation 4 milliLiter(s) Inhalation every 12 hours    MEDICATIONS  (PRN):  acetaminophen     Tablet .. 650 milliGRAM(s) Oral every 6 hours PRN Moderate Pain (4 - 6)

## 2024-01-03 NOTE — PROGRESS NOTE ADULT - SUBJECTIVE AND OBJECTIVE BOX
INTERVAL HPI/OVERNIGHT EVENTS:    SUBJECTIVE: Patient seen and examined at bedside.     no cp, sob, abd pain, fever  no sob, orthopnea, pnd, cough  OBJECTIVE:    VITAL SIGNS:  Vital Signs Last 24 Hrs  T(C): 36.1 (03 Jan 2024 12:00), Max: 36.3 (03 Jan 2024 04:00)  T(F): 97 (03 Jan 2024 12:00), Max: 97.3 (03 Jan 2024 04:00)  HR: 91 (03 Jan 2024 12:00) (77 - 94)  BP: 133/63 (03 Jan 2024 08:00) (110/56 - 133/63)  BP(mean): 90 (03 Jan 2024 08:00) (74 - 90)  RR: 40 (03 Jan 2024 12:00) (20 - 40)  SpO2: 99% (03 Jan 2024 12:00) (99% - 100%)    Parameters below as of 03 Jan 2024 08:00  Patient On (Oxygen Delivery Method): nasal cannula  O2 Flow (L/min): 4        PHYSICAL EXAM:    General: NAD  HEENT: NC/AT; PERRL, clear conjunctiva  Neck: supple  Respiratory: bl crackles  Cardiovascular: +S1/S2; RRR  Abdomen: soft, NT/ND; +BS x4  Extremities: WWP, 2+ peripheral pulses b/l; no LE edema  Skin: normal color and turgor; no rash  Neurological:    MEDICATIONS:  MEDICATIONS  (STANDING):  albuterol/ipratropium for Nebulization 3 milliLiter(s) Nebulizer every 4 hours  aspirin enteric coated 81 milliGRAM(s) Oral daily  atorvastatin 40 milliGRAM(s) Oral at bedtime  chlorhexidine 2% Cloths 1 Application(s) Topical <User Schedule>  cinacalcet 30 milliGRAM(s) Oral daily  enoxaparin Injectable 40 milliGRAM(s) SubCutaneous every 24 hours  furosemide   Injectable 20 milliGRAM(s) IV Push daily  melatonin 3 milliGRAM(s) Oral at bedtime  methylPREDNISolone sodium succinate Injectable 40 milliGRAM(s) IV Push every 24 hours  oseltamivir 30 milliGRAM(s) Oral two times a day  piperacillin/tazobactam IVPB.. 3.375 Gram(s) IV Intermittent every 12 hours  sodium chloride 3%  Inhalation 4 milliLiter(s) Inhalation every 12 hours    MEDICATIONS  (PRN):  acetaminophen     Tablet .. 650 milliGRAM(s) Oral every 6 hours PRN Moderate Pain (4 - 6)      ALLERGIES:  Allergies    No Known Allergies    Intolerances        LABS:                        9.8    7.89  )-----------( 148      ( 03 Jan 2024 04:39 )             29.3     Hemoglobin: 9.8 g/dL (01-03 @ 04:39)  Hemoglobin: 9.9 g/dL (01-02 @ 04:55)  Hemoglobin: 10.0 g/dL (01-01 @ 05:39)  Hemoglobin: 10.9 g/dL (12-31 @ 05:39)  Hemoglobin: 11.6 g/dL (12-30 @ 17:44)    CBC Full  -  ( 03 Jan 2024 04:39 )  WBC Count : 7.89 K/uL  RBC Count : 3.21 M/uL  Hemoglobin : 9.8 g/dL  Hematocrit : 29.3 %  Platelet Count - Automated : 148 K/uL  Mean Cell Volume : 91.3 fL  Mean Cell Hemoglobin : 30.5 pg  Mean Cell Hemoglobin Concentration : 33.4 g/dL  Auto Neutrophil # : 6.22 K/uL  Auto Lymphocyte # : 0.88 K/uL  Auto Monocyte # : 0.75 K/uL  Auto Eosinophil # : 0.00 K/uL  Auto Basophil # : 0.01 K/uL  Auto Neutrophil % : 78.8 %  Auto Lymphocyte % : 11.2 %  Auto Monocyte % : 9.5 %  Auto Eosinophil % : 0.0 %  Auto Basophil % : 0.1 %    01-03    139  |  101  |  51<H>  ----------------------------<  136<H>  4.2   |  28  |  1.3    Ca    8.9      03 Jan 2024 04:39  Mg     2.0     01-03    TPro  5.4<L>  /  Alb  3.1<L>  /  TBili  0.7  /  DBili  x   /  AST  21  /  ALT  16  /  AlkPhos  50  01-03    Creatinine Trend: 1.3<--, 1.2<--, 1.2<--, 1.2<--, 1.2<--, 1.4<--  LIVER FUNCTIONS - ( 03 Jan 2024 04:39 )  Alb: 3.1 g/dL / Pro: 5.4 g/dL / ALK PHOS: 50 U/L / ALT: 16 U/L / AST: 21 U/L / GGT: x               hs Troponin:                1864 <<== 01-02-24 @ 04:55            Urinalysis Basic - ( 03 Jan 2024 04:39 )    Color: x / Appearance: x / SG: x / pH: x  Gluc: 136 mg/dL / Ketone: x  / Bili: x / Urobili: x   Blood: x / Protein: x / Nitrite: x   Leuk Esterase: x / RBC: x / WBC x   Sq Epi: x / Non Sq Epi: x / Bacteria: x      CSF:                      EKG:   MICROBIOLOGY:    IMAGING:      Labs, imaging, EKG personally reviewed    RADIOLOGY & ADDITIONAL TESTS: Reviewed.

## 2024-01-03 NOTE — PROGRESS NOTE ADULT - ASSESSMENT
Impression:    Acute hypoxic respiratory failure on NC improving  Influenza A pneumonia  Possible aspiration pneumonia  Fluid overload  NSTEMI type 2   HFrEF        PLAN:    CNS: Avoid CNS depressants    HEENT: Oral care    PULMONARY:  HOB @ 45 degrees.  HHFNC,/ NIV wean as tolerated. Keep Sao2 92 to 96%, dec solumedrol to daily, CXR    CARDIOVASCULAR: diuresis as tolerated, lasix po    GI: GI prophylaxis.  Feeding.  Bowel regimen     RENAL:  Follow up lytes.  Correct as needed    INFECTIOUS DISEASE: abx. tamiflu    HEMATOLOGICAL:  DVT prophylaxis. LE doppler -    ENDOCRINE:  Follow up FS.  Insulin protocol if needed.    MUSCULOSKELETAL: OOBTC    GOC/ poor prognosis    FLOOR

## 2024-01-03 NOTE — PHYSICAL THERAPY INITIAL EVALUATION ADULT - PERTINENT HX OF CURRENT PROBLEM, REHAB EVAL
89y F pmh htn, ckd, osteoporosis, hypothyroidism, hyperparathyroidism presenting w/ fever, cough and SOB x 2days. patient lives w/ son who states she was complaining she couldn't;t breath. On admission febrile 100.5, tachypneic 28, tachycardic 117 and hypoxic 89% on RA improved on 2L NC. CXR b/l interstitial opacities and biapical thickening. Patient admitted for AHRF iso influenza pneumonia   - Patient septic on admission, started on tamiflu and IV diuretics  - Patient had elevated d-dimer and aspiration prior to admission  - developed worsening oxygenation status requiring BiPAP  - started on IV abx for suspected superimposed bacterial PNA   - patient had elevated troponins consistent w/ NSTEMI, EKG showed new TWI, cardiology consulted given lovenox 75mg x2 doses, ASA and lipitor, BB held   - patient weaned off BiPAP->HFNC->3L NC

## 2024-01-03 NOTE — PHYSICAL THERAPY INITIAL EVALUATION ADULT - GENERAL OBSERVATIONS, REHAB EVAL
SICU. 89 y/o F rec'd/left in bed, nad, + tele, 3L O2, son Donnie at b/s. pt is A+Ox2-3, [place], very pleasant and cooperative with encouragement, following simple VC's. pt sat up at EOB with mod A and stood up with max A. vitals: 118/56 94 98% on 3L O2. SICU. 89 y/o F rec'd/left in bed, nad, + tele, 3L O2, son Donnie at b/s. pt is A+Ox2-3, [place], very pleasant and cooperative with encouragement, following simple VC's. pt sat up at EOB with mod A and stood up with max A. vitals: 118/56 94 98% on 3L O2.

## 2024-01-04 LAB
ALBUMIN SERPL ELPH-MCNC: 3.1 G/DL — LOW (ref 3.5–5.2)
ALBUMIN SERPL ELPH-MCNC: 3.1 G/DL — LOW (ref 3.5–5.2)
ALP SERPL-CCNC: 55 U/L — SIGNIFICANT CHANGE UP (ref 30–115)
ALP SERPL-CCNC: 55 U/L — SIGNIFICANT CHANGE UP (ref 30–115)
ALT FLD-CCNC: 24 U/L — SIGNIFICANT CHANGE UP (ref 0–41)
ALT FLD-CCNC: 24 U/L — SIGNIFICANT CHANGE UP (ref 0–41)
ANION GAP SERPL CALC-SCNC: 7 MMOL/L — SIGNIFICANT CHANGE UP (ref 7–14)
ANION GAP SERPL CALC-SCNC: 7 MMOL/L — SIGNIFICANT CHANGE UP (ref 7–14)
AST SERPL-CCNC: 27 U/L — SIGNIFICANT CHANGE UP (ref 0–41)
AST SERPL-CCNC: 27 U/L — SIGNIFICANT CHANGE UP (ref 0–41)
BASOPHILS # BLD AUTO: 0 K/UL — SIGNIFICANT CHANGE UP (ref 0–0.2)
BASOPHILS # BLD AUTO: 0 K/UL — SIGNIFICANT CHANGE UP (ref 0–0.2)
BASOPHILS NFR BLD AUTO: 0 % — SIGNIFICANT CHANGE UP (ref 0–1)
BASOPHILS NFR BLD AUTO: 0 % — SIGNIFICANT CHANGE UP (ref 0–1)
BILIRUB SERPL-MCNC: 0.5 MG/DL — SIGNIFICANT CHANGE UP (ref 0.2–1.2)
BILIRUB SERPL-MCNC: 0.5 MG/DL — SIGNIFICANT CHANGE UP (ref 0.2–1.2)
BUN SERPL-MCNC: 48 MG/DL — HIGH (ref 10–20)
BUN SERPL-MCNC: 48 MG/DL — HIGH (ref 10–20)
CALCIUM SERPL-MCNC: 9.3 MG/DL — SIGNIFICANT CHANGE UP (ref 8.4–10.5)
CALCIUM SERPL-MCNC: 9.3 MG/DL — SIGNIFICANT CHANGE UP (ref 8.4–10.5)
CHLORIDE SERPL-SCNC: 102 MMOL/L — SIGNIFICANT CHANGE UP (ref 98–110)
CHLORIDE SERPL-SCNC: 102 MMOL/L — SIGNIFICANT CHANGE UP (ref 98–110)
CO2 SERPL-SCNC: 30 MMOL/L — SIGNIFICANT CHANGE UP (ref 17–32)
CO2 SERPL-SCNC: 30 MMOL/L — SIGNIFICANT CHANGE UP (ref 17–32)
CREAT SERPL-MCNC: 1.1 MG/DL — SIGNIFICANT CHANGE UP (ref 0.7–1.5)
CREAT SERPL-MCNC: 1.1 MG/DL — SIGNIFICANT CHANGE UP (ref 0.7–1.5)
EGFR: 45 ML/MIN/1.73M2 — LOW
EGFR: 45 ML/MIN/1.73M2 — LOW
EOSINOPHIL # BLD AUTO: 0.01 K/UL — SIGNIFICANT CHANGE UP (ref 0–0.7)
EOSINOPHIL # BLD AUTO: 0.01 K/UL — SIGNIFICANT CHANGE UP (ref 0–0.7)
EOSINOPHIL NFR BLD AUTO: 0.1 % — SIGNIFICANT CHANGE UP (ref 0–8)
EOSINOPHIL NFR BLD AUTO: 0.1 % — SIGNIFICANT CHANGE UP (ref 0–8)
GLUCOSE BLDC GLUCOMTR-MCNC: 189 MG/DL — HIGH (ref 70–99)
GLUCOSE BLDC GLUCOMTR-MCNC: 189 MG/DL — HIGH (ref 70–99)
GLUCOSE SERPL-MCNC: 110 MG/DL — HIGH (ref 70–99)
GLUCOSE SERPL-MCNC: 110 MG/DL — HIGH (ref 70–99)
HCT VFR BLD CALC: 31.9 % — LOW (ref 37–47)
HCT VFR BLD CALC: 31.9 % — LOW (ref 37–47)
HGB BLD-MCNC: 10.7 G/DL — LOW (ref 12–16)
HGB BLD-MCNC: 10.7 G/DL — LOW (ref 12–16)
IMM GRANULOCYTES NFR BLD AUTO: 0.4 % — HIGH (ref 0.1–0.3)
IMM GRANULOCYTES NFR BLD AUTO: 0.4 % — HIGH (ref 0.1–0.3)
LYMPHOCYTES # BLD AUTO: 1.23 K/UL — SIGNIFICANT CHANGE UP (ref 1.2–3.4)
LYMPHOCYTES # BLD AUTO: 1.23 K/UL — SIGNIFICANT CHANGE UP (ref 1.2–3.4)
LYMPHOCYTES # BLD AUTO: 12.5 % — LOW (ref 20.5–51.1)
LYMPHOCYTES # BLD AUTO: 12.5 % — LOW (ref 20.5–51.1)
MAGNESIUM SERPL-MCNC: 2 MG/DL — SIGNIFICANT CHANGE UP (ref 1.8–2.4)
MAGNESIUM SERPL-MCNC: 2 MG/DL — SIGNIFICANT CHANGE UP (ref 1.8–2.4)
MCHC RBC-ENTMCNC: 31.2 PG — HIGH (ref 27–31)
MCHC RBC-ENTMCNC: 31.2 PG — HIGH (ref 27–31)
MCHC RBC-ENTMCNC: 33.5 G/DL — SIGNIFICANT CHANGE UP (ref 32–37)
MCHC RBC-ENTMCNC: 33.5 G/DL — SIGNIFICANT CHANGE UP (ref 32–37)
MCV RBC AUTO: 93 FL — SIGNIFICANT CHANGE UP (ref 81–99)
MCV RBC AUTO: 93 FL — SIGNIFICANT CHANGE UP (ref 81–99)
MONOCYTES # BLD AUTO: 0.85 K/UL — HIGH (ref 0.1–0.6)
MONOCYTES # BLD AUTO: 0.85 K/UL — HIGH (ref 0.1–0.6)
MONOCYTES NFR BLD AUTO: 8.7 % — SIGNIFICANT CHANGE UP (ref 1.7–9.3)
MONOCYTES NFR BLD AUTO: 8.7 % — SIGNIFICANT CHANGE UP (ref 1.7–9.3)
NEUTROPHILS # BLD AUTO: 7.68 K/UL — HIGH (ref 1.4–6.5)
NEUTROPHILS # BLD AUTO: 7.68 K/UL — HIGH (ref 1.4–6.5)
NEUTROPHILS NFR BLD AUTO: 78.3 % — HIGH (ref 42.2–75.2)
NEUTROPHILS NFR BLD AUTO: 78.3 % — HIGH (ref 42.2–75.2)
NRBC # BLD: 0 /100 WBCS — SIGNIFICANT CHANGE UP (ref 0–0)
NRBC # BLD: 0 /100 WBCS — SIGNIFICANT CHANGE UP (ref 0–0)
PLATELET # BLD AUTO: 175 K/UL — SIGNIFICANT CHANGE UP (ref 130–400)
PLATELET # BLD AUTO: 175 K/UL — SIGNIFICANT CHANGE UP (ref 130–400)
PMV BLD: 12.1 FL — HIGH (ref 7.4–10.4)
PMV BLD: 12.1 FL — HIGH (ref 7.4–10.4)
POTASSIUM SERPL-MCNC: 4.2 MMOL/L — SIGNIFICANT CHANGE UP (ref 3.5–5)
POTASSIUM SERPL-MCNC: 4.2 MMOL/L — SIGNIFICANT CHANGE UP (ref 3.5–5)
POTASSIUM SERPL-SCNC: 4.2 MMOL/L — SIGNIFICANT CHANGE UP (ref 3.5–5)
POTASSIUM SERPL-SCNC: 4.2 MMOL/L — SIGNIFICANT CHANGE UP (ref 3.5–5)
PROT SERPL-MCNC: 5.5 G/DL — LOW (ref 6–8)
PROT SERPL-MCNC: 5.5 G/DL — LOW (ref 6–8)
RBC # BLD: 3.43 M/UL — LOW (ref 4.2–5.4)
RBC # BLD: 3.43 M/UL — LOW (ref 4.2–5.4)
RBC # FLD: 11.9 % — SIGNIFICANT CHANGE UP (ref 11.5–14.5)
RBC # FLD: 11.9 % — SIGNIFICANT CHANGE UP (ref 11.5–14.5)
SODIUM SERPL-SCNC: 139 MMOL/L — SIGNIFICANT CHANGE UP (ref 135–146)
SODIUM SERPL-SCNC: 139 MMOL/L — SIGNIFICANT CHANGE UP (ref 135–146)
WBC # BLD: 9.81 K/UL — SIGNIFICANT CHANGE UP (ref 4.8–10.8)
WBC # BLD: 9.81 K/UL — SIGNIFICANT CHANGE UP (ref 4.8–10.8)
WBC # FLD AUTO: 9.81 K/UL — SIGNIFICANT CHANGE UP (ref 4.8–10.8)
WBC # FLD AUTO: 9.81 K/UL — SIGNIFICANT CHANGE UP (ref 4.8–10.8)

## 2024-01-04 PROCEDURE — 99233 SBSQ HOSP IP/OBS HIGH 50: CPT

## 2024-01-04 PROCEDURE — 99232 SBSQ HOSP IP/OBS MODERATE 35: CPT

## 2024-01-04 RX ORDER — FUROSEMIDE 40 MG
20 TABLET ORAL DAILY
Refills: 0 | Status: DISCONTINUED | OUTPATIENT
Start: 2024-01-04 | End: 2024-01-07

## 2024-01-04 RX ADMIN — PIPERACILLIN AND TAZOBACTAM 25 GRAM(S): 4; .5 INJECTION, POWDER, LYOPHILIZED, FOR SOLUTION INTRAVENOUS at 05:02

## 2024-01-04 RX ADMIN — Medication 40 MILLIGRAM(S): at 11:43

## 2024-01-04 RX ADMIN — Medication 3 MILLILITER(S): at 07:49

## 2024-01-04 RX ADMIN — SODIUM CHLORIDE 4 MILLILITER(S): 9 INJECTION INTRAMUSCULAR; INTRAVENOUS; SUBCUTANEOUS at 07:50

## 2024-01-04 RX ADMIN — Medication 3 MILLILITER(S): at 15:07

## 2024-01-04 RX ADMIN — ATORVASTATIN CALCIUM 40 MILLIGRAM(S): 80 TABLET, FILM COATED ORAL at 21:37

## 2024-01-04 RX ADMIN — ENOXAPARIN SODIUM 40 MILLIGRAM(S): 100 INJECTION SUBCUTANEOUS at 11:43

## 2024-01-04 RX ADMIN — CHLORHEXIDINE GLUCONATE 1 APPLICATION(S): 213 SOLUTION TOPICAL at 05:10

## 2024-01-04 RX ADMIN — Medication 30 MILLIGRAM(S): at 05:02

## 2024-01-04 RX ADMIN — Medication 20 MILLIGRAM(S): at 17:15

## 2024-01-04 RX ADMIN — Medication 3 MILLIGRAM(S): at 21:37

## 2024-01-04 RX ADMIN — Medication 20 MILLIGRAM(S): at 05:02

## 2024-01-04 RX ADMIN — CINACALCET 30 MILLIGRAM(S): 30 TABLET, FILM COATED ORAL at 11:44

## 2024-01-04 RX ADMIN — PIPERACILLIN AND TAZOBACTAM 25 GRAM(S): 4; .5 INJECTION, POWDER, LYOPHILIZED, FOR SOLUTION INTRAVENOUS at 17:15

## 2024-01-04 RX ADMIN — Medication 81 MILLIGRAM(S): at 11:44

## 2024-01-04 RX ADMIN — Medication 3 MILLILITER(S): at 11:29

## 2024-01-04 RX ADMIN — Medication 30 MILLIGRAM(S): at 17:15

## 2024-01-04 NOTE — PROGRESS NOTE ADULT - SUBJECTIVE AND OBJECTIVE BOX
INTERVAL HPI/OVERNIGHT EVENTS:    SUBJECTIVE: Patient seen and examined at bedside.     no cp, abd pain, fever  sob improving, no cough, orthopnea, pnd    OBJECTIVE:    VITAL SIGNS:  Vital Signs Last 24 Hrs  T(C): 36.2 (04 Jan 2024 12:00), Max: 36.2 (04 Jan 2024 00:00)  T(F): 97.2 (04 Jan 2024 12:00), Max: 97.2 (04 Jan 2024 00:00)  HR: 93 (04 Jan 2024 12:00) (72 - 93)  BP: 128/102 (04 Jan 2024 12:00) (128/61 - 147/67)  BP(mean): 107 (04 Jan 2024 12:00) (88 - 107)  RR: 20 (04 Jan 2024 12:00) (20 - 24)  SpO2: 100% (04 Jan 2024 12:00) (99% - 100%)    Parameters below as of 04 Jan 2024 12:00  Patient On (Oxygen Delivery Method): nasal cannula  O2 Flow (L/min): 4        PHYSICAL EXAM:    General: NAD  HEENT: NC/AT; PERRL, clear conjunctiva  Neck: supple  Respiratory: bl crackles  Cardiovascular: +S1/S2; RRR  Abdomen: soft, NT/ND; +BS x4  Extremities: WWP, 2+ peripheral pulses b/l; no LE edema  Skin: normal color and turgor; no rash  Neurological:    MEDICATIONS:  MEDICATIONS  (STANDING):  albuterol/ipratropium for Nebulization 3 milliLiter(s) Nebulizer every 4 hours  aspirin enteric coated 81 milliGRAM(s) Oral daily  atorvastatin 40 milliGRAM(s) Oral at bedtime  chlorhexidine 2% Cloths 1 Application(s) Topical <User Schedule>  cinacalcet 30 milliGRAM(s) Oral daily  enoxaparin Injectable 40 milliGRAM(s) SubCutaneous every 24 hours  furosemide   Injectable 20 milliGRAM(s) IV Push daily  melatonin 3 milliGRAM(s) Oral at bedtime  methylPREDNISolone sodium succinate Injectable 40 milliGRAM(s) IV Push every 24 hours  oseltamivir 30 milliGRAM(s) Oral two times a day  piperacillin/tazobactam IVPB.. 3.375 Gram(s) IV Intermittent every 12 hours  sodium chloride 3%  Inhalation 4 milliLiter(s) Inhalation every 12 hours    MEDICATIONS  (PRN):  acetaminophen     Tablet .. 650 milliGRAM(s) Oral every 6 hours PRN Moderate Pain (4 - 6)      ALLERGIES:  Allergies    No Known Allergies    Intolerances        LABS:                        10.7   9.81  )-----------( 175      ( 04 Jan 2024 04:44 )             31.9     Hemoglobin: 10.7 g/dL (01-04 @ 04:44)  Hemoglobin: 9.8 g/dL (01-03 @ 04:39)  Hemoglobin: 9.9 g/dL (01-02 @ 04:55)  Hemoglobin: 10.0 g/dL (01-01 @ 05:39)  Hemoglobin: 10.9 g/dL (12-31 @ 05:39)    CBC Full  -  ( 04 Jan 2024 04:44 )  WBC Count : 9.81 K/uL  RBC Count : 3.43 M/uL  Hemoglobin : 10.7 g/dL  Hematocrit : 31.9 %  Platelet Count - Automated : 175 K/uL  Mean Cell Volume : 93.0 fL  Mean Cell Hemoglobin : 31.2 pg  Mean Cell Hemoglobin Concentration : 33.5 g/dL  Auto Neutrophil # : 7.68 K/uL  Auto Lymphocyte # : 1.23 K/uL  Auto Monocyte # : 0.85 K/uL  Auto Eosinophil # : 0.01 K/uL  Auto Basophil # : 0.00 K/uL  Auto Neutrophil % : 78.3 %  Auto Lymphocyte % : 12.5 %  Auto Monocyte % : 8.7 %  Auto Eosinophil % : 0.1 %  Auto Basophil % : 0.0 %    01-04    139  |  102  |  48<H>  ----------------------------<  110<H>  4.2   |  30  |  1.1    Ca    9.3      04 Jan 2024 04:44  Mg     2.0     01-04    TPro  5.5<L>  /  Alb  3.1<L>  /  TBili  0.5  /  DBili  x   /  AST  27  /  ALT  24  /  AlkPhos  55  01-04    Creatinine Trend: 1.1<--, 1.3<--, 1.2<--, 1.2<--, 1.2<--, 1.2<--  LIVER FUNCTIONS - ( 04 Jan 2024 04:44 )  Alb: 3.1 g/dL / Pro: 5.5 g/dL / ALK PHOS: 55 U/L / ALT: 24 U/L / AST: 27 U/L / GGT: x               hs Troponin:            Urinalysis Basic - ( 04 Jan 2024 04:44 )    Color: x / Appearance: x / SG: x / pH: x  Gluc: 110 mg/dL / Ketone: x  / Bili: x / Urobili: x   Blood: x / Protein: x / Nitrite: x   Leuk Esterase: x / RBC: x / WBC x   Sq Epi: x / Non Sq Epi: x / Bacteria: x      CSF:                      EKG:   MICROBIOLOGY:    IMAGING:      Labs, imaging, EKG personally reviewed    RADIOLOGY & ADDITIONAL TESTS: Reviewed.

## 2024-01-04 NOTE — PROGRESS NOTE ADULT - SUBJECTIVE AND OBJECTIVE BOX
Over Night Events: events noted, on NC, CXR improving    PHYSICAL EXAM    ICU Vital Signs Last 24 Hrs  T(C): 36.2 (04 Jan 2024 04:00), Max: 36.2 (03 Jan 2024 07:25)  T(F): 97.2 (04 Jan 2024 04:00), Max: 97.2 (03 Jan 2024 07:25)  HR: 86 (04 Jan 2024 00:00) (77 - 91)  BP: 128/61 (04 Jan 2024 00:00) (128/61 - 133/63)  BP(mean): 88 (04 Jan 2024 00:00) (88 - 90)  RR: 20 (04 Jan 2024 04:00) (20 - 40)  SpO2: 100% (04 Jan 2024 04:00) (99% - 100%)    O2 Parameters below as of 04 Jan 2024 04:00  Patient On (Oxygen Delivery Method): nasal cannula  O2 Flow (L/min): 4          General: ill looking  Lungs: Bilateral crackles  Cardiovascular: CHRISS 2.6  Abdomen: Soft, Positive BS  Extremities: No clubbing   Skin: Warm  Neurological: Non focal       01-02-24 @ 07:01  -  01-03-24 @ 07:00  --------------------------------------------------------  IN:    IV PiggyBack: 200 mL    Oral Fluid: 1920 mL  Total IN: 2120 mL    OUT:    Stool (mL): 400 mL    Voided (mL): 1470 mL  Total OUT: 1870 mL    Total NET: 250 mL      01-03-24 @ 07:01  -  01-04-24 @ 06:43  --------------------------------------------------------  IN:    IV PiggyBack: 100 mL  Total IN: 100 mL    OUT:    Voided (mL): 550 mL  Total OUT: 550 mL    Total NET: -450 mL          LABS:                          10.7   9.81  )-----------( 175      ( 04 Jan 2024 04:44 )             31.9                                               01-04    139  |  102  |  48<H>  ----------------------------<  110<H>  4.2   |  30  |  1.1    Ca    9.3      04 Jan 2024 04:44  Mg     2.0     01-04    TPro  5.5<L>  /  Alb  3.1<L>  /  TBili  0.5  /  DBili  x   /  AST  27  /  ALT  24  /  AlkPhos  55  01-04                                             Urinalysis Basic - ( 04 Jan 2024 04:44 )    Color: x / Appearance: x / SG: x / pH: x  Gluc: 110 mg/dL / Ketone: x  / Bili: x / Urobili: x   Blood: x / Protein: x / Nitrite: x   Leuk Esterase: x / RBC: x / WBC x   Sq Epi: x / Non Sq Epi: x / Bacteria: x                                                  LIVER FUNCTIONS - ( 04 Jan 2024 04:44 )  Alb: 3.1 g/dL / Pro: 5.5 g/dL / ALK PHOS: 55 U/L / ALT: 24 U/L / AST: 27 U/L / GGT: x                                                                                                                                       MEDICATIONS  (STANDING):  albuterol/ipratropium for Nebulization 3 milliLiter(s) Nebulizer every 4 hours  aspirin enteric coated 81 milliGRAM(s) Oral daily  atorvastatin 40 milliGRAM(s) Oral at bedtime  chlorhexidine 2% Cloths 1 Application(s) Topical <User Schedule>  cinacalcet 30 milliGRAM(s) Oral daily  enoxaparin Injectable 40 milliGRAM(s) SubCutaneous every 24 hours  furosemide   Injectable 20 milliGRAM(s) IV Push daily  melatonin 3 milliGRAM(s) Oral at bedtime  methylPREDNISolone sodium succinate Injectable 40 milliGRAM(s) IV Push every 24 hours  oseltamivir 30 milliGRAM(s) Oral two times a day  piperacillin/tazobactam IVPB.. 3.375 Gram(s) IV Intermittent every 12 hours  sodium chloride 3%  Inhalation 4 milliLiter(s) Inhalation every 12 hours    MEDICATIONS  (PRN):  acetaminophen     Tablet .. 650 milliGRAM(s) Oral every 6 hours PRN Moderate Pain (4 - 6)

## 2024-01-04 NOTE — OCCUPATIONAL THERAPY INITIAL EVALUATION ADULT - LEVEL OF INDEPENDENCE: SUPINE/SIT, REHAB EVAL
Pt saturated 2/2 urinary accident. Pt requesting to be returned to bed for changing. Declined standing to complete cleaning./moderate assist (50% patients effort)

## 2024-01-04 NOTE — PROGRESS NOTE ADULT - PROBLEM SELECTOR PLAN 1
In the setting of Influenza A, possible fluid overload  -on nasal cannula at time of visit  -IV antibiotics, oseltamivir, and IV diurectics per primary team/pulm  -trend creatinine/lytes daily  -High risk.
with acute hypoxic resp failure; due to influenza +/- concomitant bacterial pna  cxr bl opacities  tamiflu  zosyn to complete course  lasix 20 iv for now  repeat cxr  va duplex neg  solumedrol 40 iv  nc to keep spo2 >92
with acute hypoxic resp failure; due to influenza +/- concomitant bacterial pna  cxr bl opacities  tamiflu  zosyn to complete course  lasix 20 iv change to 20 po  va duplex neg  solumedrol 40 iv  nc to keep spo2 >92

## 2024-01-04 NOTE — PROGRESS NOTE ADULT - ASSESSMENT
Impression:    Acute hypoxic respiratory failure on NC improving  Influenza A pneumonia  Possible aspiration pneumonia  Fluid overload  NSTEMI type 2   HFrEF        PLAN:    CNS: Avoid CNS depressants    HEENT: Oral care    PULMONARY:  HOB @ 45 degrees.  HHFNC,/ NIV wean as tolerated. Keep Sao2 92 to 96%, dec solumedrol to daily, CXR    CARDIOVASCULAR: diuresis as tolerated, lasix po Daily    GI: GI prophylaxis.  Feeding.  Bowel regimen     RENAL:  Follow up lytes.  Correct as needed    INFECTIOUS DISEASE: abx. tamiflu, zosyn x 7 d    HEMATOLOGICAL:  DVT prophylaxis. LE doppler -    ENDOCRINE:  Follow up FS.  Insulin protocol if needed.    MUSCULOSKELETAL: OOBTC      FLOOR

## 2024-01-04 NOTE — OCCUPATIONAL THERAPY INITIAL EVALUATION ADULT - PHYSICAL ASSIST/NONPHYSICAL ASSIST: SUPINE/SIT, REHAB EVAL
Mag 0 7  Replete as necessary with goal greater than 2  Resolved  Will be discharged with magnesium supplementation verbal cues/1 person assist

## 2024-01-04 NOTE — PROGRESS NOTE ADULT - NSPROGADDITIONALINFOA_GEN_ALL_CORE
#Progress Note Handoff:  Pending (specify):  Consults_________, Tests________, Test Results_______, Other____hypoxia_____  Family discussion: d/w pt, son at bedside  Disposition: Home___/SNF___/Other________/Unknown at this time___x_____
#Progress Note Handoff:  Pending (specify):  Consults_________, Tests________, Test Results_______, Other____hypoxia_____  Family discussion: d/w pt, son at bedside  Disposition: Home___/SNF___/Other________/Unknown at this time___x_____

## 2024-01-04 NOTE — PROGRESS NOTE ADULT - PROBLEM SELECTOR PLAN 2
-Likely demand ischemia  -follow up cardiology.
no cp  ekg noted  trend  tte with ef 30-35%, +rwma  recall cards
no cp  ekg noted  trend  tte with ef 30-35%, +rwma  recall cards

## 2024-01-05 LAB
ALBUMIN SERPL ELPH-MCNC: 3.5 G/DL — SIGNIFICANT CHANGE UP (ref 3.5–5.2)
ALBUMIN SERPL ELPH-MCNC: 3.5 G/DL — SIGNIFICANT CHANGE UP (ref 3.5–5.2)
ALP SERPL-CCNC: 62 U/L — SIGNIFICANT CHANGE UP (ref 30–115)
ALP SERPL-CCNC: 62 U/L — SIGNIFICANT CHANGE UP (ref 30–115)
ALT FLD-CCNC: 22 U/L — SIGNIFICANT CHANGE UP (ref 0–41)
ALT FLD-CCNC: 22 U/L — SIGNIFICANT CHANGE UP (ref 0–41)
ANION GAP SERPL CALC-SCNC: 10 MMOL/L — SIGNIFICANT CHANGE UP (ref 7–14)
ANION GAP SERPL CALC-SCNC: 10 MMOL/L — SIGNIFICANT CHANGE UP (ref 7–14)
AST SERPL-CCNC: 22 U/L — SIGNIFICANT CHANGE UP (ref 0–41)
AST SERPL-CCNC: 22 U/L — SIGNIFICANT CHANGE UP (ref 0–41)
BASOPHILS # BLD AUTO: 0.01 K/UL — SIGNIFICANT CHANGE UP (ref 0–0.2)
BASOPHILS # BLD AUTO: 0.01 K/UL — SIGNIFICANT CHANGE UP (ref 0–0.2)
BASOPHILS NFR BLD AUTO: 0.1 % — SIGNIFICANT CHANGE UP (ref 0–1)
BASOPHILS NFR BLD AUTO: 0.1 % — SIGNIFICANT CHANGE UP (ref 0–1)
BILIRUB SERPL-MCNC: 0.8 MG/DL — SIGNIFICANT CHANGE UP (ref 0.2–1.2)
BILIRUB SERPL-MCNC: 0.8 MG/DL — SIGNIFICANT CHANGE UP (ref 0.2–1.2)
BUN SERPL-MCNC: 45 MG/DL — HIGH (ref 10–20)
BUN SERPL-MCNC: 45 MG/DL — HIGH (ref 10–20)
CALCIUM SERPL-MCNC: 9.6 MG/DL — SIGNIFICANT CHANGE UP (ref 8.4–10.5)
CALCIUM SERPL-MCNC: 9.6 MG/DL — SIGNIFICANT CHANGE UP (ref 8.4–10.5)
CHLORIDE SERPL-SCNC: 99 MMOL/L — SIGNIFICANT CHANGE UP (ref 98–110)
CHLORIDE SERPL-SCNC: 99 MMOL/L — SIGNIFICANT CHANGE UP (ref 98–110)
CO2 SERPL-SCNC: 31 MMOL/L — SIGNIFICANT CHANGE UP (ref 17–32)
CO2 SERPL-SCNC: 31 MMOL/L — SIGNIFICANT CHANGE UP (ref 17–32)
CREAT SERPL-MCNC: 1 MG/DL — SIGNIFICANT CHANGE UP (ref 0.7–1.5)
CREAT SERPL-MCNC: 1 MG/DL — SIGNIFICANT CHANGE UP (ref 0.7–1.5)
EGFR: 50 ML/MIN/1.73M2 — LOW
EGFR: 50 ML/MIN/1.73M2 — LOW
EOSINOPHIL # BLD AUTO: 0.09 K/UL — SIGNIFICANT CHANGE UP (ref 0–0.7)
EOSINOPHIL # BLD AUTO: 0.09 K/UL — SIGNIFICANT CHANGE UP (ref 0–0.7)
EOSINOPHIL NFR BLD AUTO: 0.7 % — SIGNIFICANT CHANGE UP (ref 0–8)
EOSINOPHIL NFR BLD AUTO: 0.7 % — SIGNIFICANT CHANGE UP (ref 0–8)
GLUCOSE SERPL-MCNC: 93 MG/DL — SIGNIFICANT CHANGE UP (ref 70–99)
GLUCOSE SERPL-MCNC: 93 MG/DL — SIGNIFICANT CHANGE UP (ref 70–99)
HCT VFR BLD CALC: 35.1 % — LOW (ref 37–47)
HCT VFR BLD CALC: 35.1 % — LOW (ref 37–47)
HGB BLD-MCNC: 11.5 G/DL — LOW (ref 12–16)
HGB BLD-MCNC: 11.5 G/DL — LOW (ref 12–16)
IMM GRANULOCYTES NFR BLD AUTO: 0.4 % — HIGH (ref 0.1–0.3)
IMM GRANULOCYTES NFR BLD AUTO: 0.4 % — HIGH (ref 0.1–0.3)
LYMPHOCYTES # BLD AUTO: 1.4 K/UL — SIGNIFICANT CHANGE UP (ref 1.2–3.4)
LYMPHOCYTES # BLD AUTO: 1.4 K/UL — SIGNIFICANT CHANGE UP (ref 1.2–3.4)
LYMPHOCYTES # BLD AUTO: 11.3 % — LOW (ref 20.5–51.1)
LYMPHOCYTES # BLD AUTO: 11.3 % — LOW (ref 20.5–51.1)
MAGNESIUM SERPL-MCNC: 2.1 MG/DL — SIGNIFICANT CHANGE UP (ref 1.8–2.4)
MAGNESIUM SERPL-MCNC: 2.1 MG/DL — SIGNIFICANT CHANGE UP (ref 1.8–2.4)
MCHC RBC-ENTMCNC: 30.7 PG — SIGNIFICANT CHANGE UP (ref 27–31)
MCHC RBC-ENTMCNC: 30.7 PG — SIGNIFICANT CHANGE UP (ref 27–31)
MCHC RBC-ENTMCNC: 32.8 G/DL — SIGNIFICANT CHANGE UP (ref 32–37)
MCHC RBC-ENTMCNC: 32.8 G/DL — SIGNIFICANT CHANGE UP (ref 32–37)
MCV RBC AUTO: 93.9 FL — SIGNIFICANT CHANGE UP (ref 81–99)
MCV RBC AUTO: 93.9 FL — SIGNIFICANT CHANGE UP (ref 81–99)
MONOCYTES # BLD AUTO: 1.21 K/UL — HIGH (ref 0.1–0.6)
MONOCYTES # BLD AUTO: 1.21 K/UL — HIGH (ref 0.1–0.6)
MONOCYTES NFR BLD AUTO: 9.8 % — HIGH (ref 1.7–9.3)
MONOCYTES NFR BLD AUTO: 9.8 % — HIGH (ref 1.7–9.3)
NEUTROPHILS # BLD AUTO: 9.59 K/UL — HIGH (ref 1.4–6.5)
NEUTROPHILS # BLD AUTO: 9.59 K/UL — HIGH (ref 1.4–6.5)
NEUTROPHILS NFR BLD AUTO: 77.7 % — HIGH (ref 42.2–75.2)
NEUTROPHILS NFR BLD AUTO: 77.7 % — HIGH (ref 42.2–75.2)
NRBC # BLD: 0 /100 WBCS — SIGNIFICANT CHANGE UP (ref 0–0)
NRBC # BLD: 0 /100 WBCS — SIGNIFICANT CHANGE UP (ref 0–0)
PLATELET # BLD AUTO: 222 K/UL — SIGNIFICANT CHANGE UP (ref 130–400)
PLATELET # BLD AUTO: 222 K/UL — SIGNIFICANT CHANGE UP (ref 130–400)
PMV BLD: 12 FL — HIGH (ref 7.4–10.4)
PMV BLD: 12 FL — HIGH (ref 7.4–10.4)
POTASSIUM SERPL-MCNC: 4 MMOL/L — SIGNIFICANT CHANGE UP (ref 3.5–5)
POTASSIUM SERPL-MCNC: 4 MMOL/L — SIGNIFICANT CHANGE UP (ref 3.5–5)
POTASSIUM SERPL-SCNC: 4 MMOL/L — SIGNIFICANT CHANGE UP (ref 3.5–5)
POTASSIUM SERPL-SCNC: 4 MMOL/L — SIGNIFICANT CHANGE UP (ref 3.5–5)
PROT SERPL-MCNC: 5.9 G/DL — LOW (ref 6–8)
PROT SERPL-MCNC: 5.9 G/DL — LOW (ref 6–8)
RBC # BLD: 3.74 M/UL — LOW (ref 4.2–5.4)
RBC # BLD: 3.74 M/UL — LOW (ref 4.2–5.4)
RBC # FLD: 11.9 % — SIGNIFICANT CHANGE UP (ref 11.5–14.5)
RBC # FLD: 11.9 % — SIGNIFICANT CHANGE UP (ref 11.5–14.5)
SODIUM SERPL-SCNC: 140 MMOL/L — SIGNIFICANT CHANGE UP (ref 135–146)
SODIUM SERPL-SCNC: 140 MMOL/L — SIGNIFICANT CHANGE UP (ref 135–146)
TROPONIN T, HIGH SENSITIVITY RESULT: 1891 NG/L — CRITICAL HIGH (ref 6–13)
TROPONIN T, HIGH SENSITIVITY RESULT: 1891 NG/L — CRITICAL HIGH (ref 6–13)
WBC # BLD: 12.35 K/UL — HIGH (ref 4.8–10.8)
WBC # BLD: 12.35 K/UL — HIGH (ref 4.8–10.8)
WBC # FLD AUTO: 12.35 K/UL — HIGH (ref 4.8–10.8)
WBC # FLD AUTO: 12.35 K/UL — HIGH (ref 4.8–10.8)

## 2024-01-05 PROCEDURE — 71045 X-RAY EXAM CHEST 1 VIEW: CPT | Mod: 26

## 2024-01-05 PROCEDURE — 99233 SBSQ HOSP IP/OBS HIGH 50: CPT

## 2024-01-05 RX ORDER — METOPROLOL TARTRATE 50 MG
25 TABLET ORAL EVERY 12 HOURS
Refills: 0 | Status: DISCONTINUED | OUTPATIENT
Start: 2024-01-05 | End: 2024-01-09

## 2024-01-05 RX ADMIN — Medication 40 MILLIGRAM(S): at 11:51

## 2024-01-05 RX ADMIN — Medication 81 MILLIGRAM(S): at 11:52

## 2024-01-05 RX ADMIN — Medication 25 MILLIGRAM(S): at 17:33

## 2024-01-05 RX ADMIN — Medication 30 MILLIGRAM(S): at 05:47

## 2024-01-05 RX ADMIN — CINACALCET 30 MILLIGRAM(S): 30 TABLET, FILM COATED ORAL at 11:52

## 2024-01-05 RX ADMIN — PIPERACILLIN AND TAZOBACTAM 25 GRAM(S): 4; .5 INJECTION, POWDER, LYOPHILIZED, FOR SOLUTION INTRAVENOUS at 17:32

## 2024-01-05 RX ADMIN — ATORVASTATIN CALCIUM 40 MILLIGRAM(S): 80 TABLET, FILM COATED ORAL at 21:16

## 2024-01-05 RX ADMIN — CHLORHEXIDINE GLUCONATE 1 APPLICATION(S): 213 SOLUTION TOPICAL at 05:48

## 2024-01-05 RX ADMIN — Medication 30 MILLIGRAM(S): at 17:33

## 2024-01-05 RX ADMIN — Medication 3 MILLILITER(S): at 15:12

## 2024-01-05 RX ADMIN — Medication 25 MILLIGRAM(S): at 13:55

## 2024-01-05 RX ADMIN — PIPERACILLIN AND TAZOBACTAM 25 GRAM(S): 4; .5 INJECTION, POWDER, LYOPHILIZED, FOR SOLUTION INTRAVENOUS at 05:46

## 2024-01-05 RX ADMIN — Medication 20 MILLIGRAM(S): at 05:47

## 2024-01-05 RX ADMIN — Medication 3 MILLIGRAM(S): at 21:16

## 2024-01-05 RX ADMIN — ENOXAPARIN SODIUM 40 MILLIGRAM(S): 100 INJECTION SUBCUTANEOUS at 11:51

## 2024-01-05 NOTE — PROGRESS NOTE ADULT - SUBJECTIVE AND OBJECTIVE BOX
24H events:    Patient is a 89y old Female who presents with a chief complaint of shortness of breath (04 Jan 2024 12:36)    Primary diagnosis of Influenza A      Day 1:  Day 2:  Day 3:     Today is hospital day 8d. This morning patient was seen and examined at bedside, resting comfortably in bed.    No acute or major events overnight.    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    H/O total knee replacement, bilateral    H/O carpal tunnel repair  B/L      SOCIAL HISTORY:  Social History:      ALLERGIES:  No Known Allergies    MEDICATIONS:  STANDING MEDICATIONS  albuterol/ipratropium for Nebulization 3 milliLiter(s) Nebulizer every 4 hours  aspirin enteric coated 81 milliGRAM(s) Oral daily  atorvastatin 40 milliGRAM(s) Oral at bedtime  chlorhexidine 2% Cloths 1 Application(s) Topical <User Schedule>  cinacalcet 30 milliGRAM(s) Oral daily  enoxaparin Injectable 40 milliGRAM(s) SubCutaneous every 24 hours  furosemide    Tablet 20 milliGRAM(s) Oral daily  melatonin 3 milliGRAM(s) Oral at bedtime  methylPREDNISolone sodium succinate Injectable 40 milliGRAM(s) IV Push every 24 hours  oseltamivir 30 milliGRAM(s) Oral two times a day  piperacillin/tazobactam IVPB.. 3.375 Gram(s) IV Intermittent every 12 hours  sodium chloride 3%  Inhalation 4 milliLiter(s) Inhalation every 12 hours    PRN MEDICATIONS  acetaminophen     Tablet .. 650 milliGRAM(s) Oral every 6 hours PRN    VITALS:   T(F): 97.5  HR: 86  BP: 136/59  RR: 18  SpO2: 100%    PHYSICAL EXAM:  GENERAL: NAD, lying in bed comfortably       HEART:  +S1/S2; RRR  LUNGS: B/L crackles  ABDOMEN:  Soft     nondistended    nontender     EXTREMITIES: Normal   no edema   NERVOUS SYSTEM:   A&Ox3     SKIN: No rashes or lesions         LABS:                        11.5   12.35 )-----------( 222      ( 05 Jan 2024 07:38 )             35.1     01-05    140  |  99  |  45<H>  ----------------------------<  93  4.0   |  31  |  1.0    Ca    9.6      05 Jan 2024 07:38  Mg     2.1     01-05    TPro  5.9<L>  /  Alb  3.5  /  TBili  0.8  /  DBili  x   /  AST  22  /  ALT  22  /  AlkPhos  62  01-05      Urinalysis Basic - ( 05 Jan 2024 07:38 )    Color: x / Appearance: x / SG: x / pH: x  Gluc: 93 mg/dL / Ketone: x  / Bili: x / Urobili: x   Blood: x / Protein: x / Nitrite: x   Leuk Esterase: x / RBC: x / WBC x   Sq Epi: x / Non Sq Epi: x / Bacteria: x                RADIOLOGY:

## 2024-01-05 NOTE — PROGRESS NOTE ADULT - ASSESSMENT
· Assessment	  89F PMHx HTN, hypothyroidism, hyperparathyroidism here with sepsis, present on admission, with acute hypoxic resp failure, found to have influenza a. Elevated cardiac enzymes.    #Acute hypoxic resp failure 2/2 due to influenza +/- concomitant bacterial pna  -Xray B/l opacities  -Tamiflu   -Zosyn X 7 dayds   -Lasix 20 po   -solumedrol 40q24    #NSTEMI  #HFrEF  -Troponin elevated  -Echo -EF 30-35% , RWMA   -No chest pain  -EKG noted  -Aspirin 81 mg and Atorvastatin, cardio recommended not to start IV Heparin due to advanced age and elevated risk of bleeding       HTN   -controlled  -outpt f/u.     #Hypothyroidism.   outpt f/u.    # H/O hyperparathyroidism.    sensipar 30.    # DVT prophylaxis  - lovenox · Assessment	  89F PMHx HTN, hypothyroidism, hyperparathyroidism here with sepsis, present on admission, with acute hypoxic resp failure, found to have influenza a. Elevated cardiac enzymes.    #Acute hypoxic resp failure 2/2 due to influenza +/- concomitant bacterial pna  -Xray B/l opacities  -Tamiflu   -Zosyn X 7 dayds   -Lasix 20 po   -solumedrol 40q24    #NSTEMI  #HFrEF  -Troponin elevated  -Echo -EF 30-35% , RWMA   -No chest pain  -EKG noted  -Aspirin 81 mg and Atorvastatin, cardio recommended not to start IV Heparin due to advanced age and elevated risk of bleeding  12/5 - spoke to cardio- c/w current management        HTN   -controlled  -outpt f/u.     #Hypothyroidism.   outpt f/u.    # H/O hyperparathyroidism.    sensipar 30.    # DVT prophylaxis  - lovenox

## 2024-01-05 NOTE — PROGRESS NOTE ADULT - ATTENDING COMMENTS
Pt seen and examined this morning. Case and Plan discussed at rounds.   Pt is doing well today and is AOx4 and understands her diagnosis   She does not want heroic life saving measures and is DNR/DNI  Acute Issues on current admission:  - Influenza A PNA (Droplet Isolation)   - Suspected bacterial PNA superimposed   - Acute NSTEMI w/ EF 30-35% and pt DNR/DNI  - Discussed with CV and EP plan is GDMT and f/u outpt for repeat TTE in 3months - may benefit from Entresto however BP marginal today  - will follow   - c/w all care   - f/u PT eval     MEDICATIONS  (STANDING):  albuterol/ipratropium for Nebulization 3 milliLiter(s) Nebulizer every 4 hours  aspirin enteric coated 81 milliGRAM(s) Oral daily  atorvastatin 40 milliGRAM(s) Oral at bedtime  chlorhexidine 2% Cloths 1 Application(s) Topical <User Schedule>  cinacalcet 30 milliGRAM(s) Oral daily  enoxaparin Injectable 40 milliGRAM(s) SubCutaneous every 24 hours  furosemide    Tablet 20 milliGRAM(s) Oral daily  melatonin 3 milliGRAM(s) Oral at bedtime  methylPREDNISolone sodium succinate Injectable 40 milliGRAM(s) IV Push every 24 hours  metoprolol tartrate 25 milliGRAM(s) Oral every 12 hours  oseltamivir 30 milliGRAM(s) Oral two times a day  sodium chloride 3%  Inhalation 4 milliLiter(s) Inhalation every 12 hours    MEDICATIONS  (PRN):  acetaminophen     Tablet .. 650 milliGRAM(s) Oral every 6 hours PRN Moderate Pain (4 - 6)      Dispo: acute / f/u PT eval Pt seen and examined this morning. Case and Plan discussed at rounds.   Pt is doing well today and is AOx4 and understands her diagnosis   She does not want heroic life saving measures and is DNR/DNI  Acute Issues on current admission:  - Influenza A PNA (Droplet Isolation)   - Suspected bacterial PNA superimposed   - Acute NSTEMI   - Acute HFrEF EF 30-35% and pt DNR/DNI  - Discussed with CV and EP plan is GDMT and f/u outpt for repeat TTE in 3months - may benefit from Entresto however BP marginal today  - Not a candidate for Life Vest   - will follow   - c/w all care   - f/u PT eval     MEDICATIONS  (STANDING):  albuterol/ipratropium for Nebulization 3 milliLiter(s) Nebulizer every 4 hours  aspirin enteric coated 81 milliGRAM(s) Oral daily  atorvastatin 40 milliGRAM(s) Oral at bedtime  chlorhexidine 2% Cloths 1 Application(s) Topical <User Schedule>  cinacalcet 30 milliGRAM(s) Oral daily  enoxaparin Injectable 40 milliGRAM(s) SubCutaneous every 24 hours  furosemide    Tablet 20 milliGRAM(s) Oral daily  melatonin 3 milliGRAM(s) Oral at bedtime  methylPREDNISolone sodium succinate Injectable 40 milliGRAM(s) IV Push every 24 hours  metoprolol tartrate 25 milliGRAM(s) Oral every 12 hours  oseltamivir 30 milliGRAM(s) Oral two times a day  sodium chloride 3%  Inhalation 4 milliLiter(s) Inhalation every 12 hours    MEDICATIONS  (PRN):  acetaminophen     Tablet .. 650 milliGRAM(s) Oral every 6 hours PRN Moderate Pain (4 - 6)      Dispo: acute / f/u PT eval

## 2024-01-06 LAB
ALBUMIN SERPL ELPH-MCNC: 3.3 G/DL — LOW (ref 3.5–5.2)
ALBUMIN SERPL ELPH-MCNC: 3.3 G/DL — LOW (ref 3.5–5.2)
ALP SERPL-CCNC: 61 U/L — SIGNIFICANT CHANGE UP (ref 30–115)
ALP SERPL-CCNC: 61 U/L — SIGNIFICANT CHANGE UP (ref 30–115)
ALT FLD-CCNC: 22 U/L — SIGNIFICANT CHANGE UP (ref 0–41)
ALT FLD-CCNC: 22 U/L — SIGNIFICANT CHANGE UP (ref 0–41)
ANION GAP SERPL CALC-SCNC: 12 MMOL/L — SIGNIFICANT CHANGE UP (ref 7–14)
ANION GAP SERPL CALC-SCNC: 12 MMOL/L — SIGNIFICANT CHANGE UP (ref 7–14)
AST SERPL-CCNC: 32 U/L — SIGNIFICANT CHANGE UP (ref 0–41)
AST SERPL-CCNC: 32 U/L — SIGNIFICANT CHANGE UP (ref 0–41)
BASOPHILS # BLD AUTO: 0.02 K/UL — SIGNIFICANT CHANGE UP (ref 0–0.2)
BASOPHILS # BLD AUTO: 0.02 K/UL — SIGNIFICANT CHANGE UP (ref 0–0.2)
BASOPHILS NFR BLD AUTO: 0.2 % — SIGNIFICANT CHANGE UP (ref 0–1)
BASOPHILS NFR BLD AUTO: 0.2 % — SIGNIFICANT CHANGE UP (ref 0–1)
BILIRUB SERPL-MCNC: 0.7 MG/DL — SIGNIFICANT CHANGE UP (ref 0.2–1.2)
BILIRUB SERPL-MCNC: 0.7 MG/DL — SIGNIFICANT CHANGE UP (ref 0.2–1.2)
BUN SERPL-MCNC: 44 MG/DL — HIGH (ref 10–20)
BUN SERPL-MCNC: 44 MG/DL — HIGH (ref 10–20)
CALCIUM SERPL-MCNC: 9.5 MG/DL — SIGNIFICANT CHANGE UP (ref 8.4–10.5)
CALCIUM SERPL-MCNC: 9.5 MG/DL — SIGNIFICANT CHANGE UP (ref 8.4–10.5)
CHLORIDE SERPL-SCNC: 98 MMOL/L — SIGNIFICANT CHANGE UP (ref 98–110)
CHLORIDE SERPL-SCNC: 98 MMOL/L — SIGNIFICANT CHANGE UP (ref 98–110)
CO2 SERPL-SCNC: 27 MMOL/L — SIGNIFICANT CHANGE UP (ref 17–32)
CO2 SERPL-SCNC: 27 MMOL/L — SIGNIFICANT CHANGE UP (ref 17–32)
CREAT SERPL-MCNC: 1 MG/DL — SIGNIFICANT CHANGE UP (ref 0.7–1.5)
CREAT SERPL-MCNC: 1 MG/DL — SIGNIFICANT CHANGE UP (ref 0.7–1.5)
EGFR: 50 ML/MIN/1.73M2 — LOW
EGFR: 50 ML/MIN/1.73M2 — LOW
EOSINOPHIL # BLD AUTO: 0.09 K/UL — SIGNIFICANT CHANGE UP (ref 0–0.7)
EOSINOPHIL # BLD AUTO: 0.09 K/UL — SIGNIFICANT CHANGE UP (ref 0–0.7)
EOSINOPHIL NFR BLD AUTO: 0.8 % — SIGNIFICANT CHANGE UP (ref 0–8)
EOSINOPHIL NFR BLD AUTO: 0.8 % — SIGNIFICANT CHANGE UP (ref 0–8)
GLUCOSE SERPL-MCNC: 91 MG/DL — SIGNIFICANT CHANGE UP (ref 70–99)
GLUCOSE SERPL-MCNC: 91 MG/DL — SIGNIFICANT CHANGE UP (ref 70–99)
HCT VFR BLD CALC: 34.7 % — LOW (ref 37–47)
HCT VFR BLD CALC: 34.7 % — LOW (ref 37–47)
HGB BLD-MCNC: 11.6 G/DL — LOW (ref 12–16)
HGB BLD-MCNC: 11.6 G/DL — LOW (ref 12–16)
IMM GRANULOCYTES NFR BLD AUTO: 0.9 % — HIGH (ref 0.1–0.3)
IMM GRANULOCYTES NFR BLD AUTO: 0.9 % — HIGH (ref 0.1–0.3)
LYMPHOCYTES # BLD AUTO: 1.57 K/UL — SIGNIFICANT CHANGE UP (ref 1.2–3.4)
LYMPHOCYTES # BLD AUTO: 1.57 K/UL — SIGNIFICANT CHANGE UP (ref 1.2–3.4)
LYMPHOCYTES # BLD AUTO: 14.3 % — LOW (ref 20.5–51.1)
LYMPHOCYTES # BLD AUTO: 14.3 % — LOW (ref 20.5–51.1)
MAGNESIUM SERPL-MCNC: 2.2 MG/DL — SIGNIFICANT CHANGE UP (ref 1.8–2.4)
MAGNESIUM SERPL-MCNC: 2.2 MG/DL — SIGNIFICANT CHANGE UP (ref 1.8–2.4)
MCHC RBC-ENTMCNC: 30.8 PG — SIGNIFICANT CHANGE UP (ref 27–31)
MCHC RBC-ENTMCNC: 30.8 PG — SIGNIFICANT CHANGE UP (ref 27–31)
MCHC RBC-ENTMCNC: 33.4 G/DL — SIGNIFICANT CHANGE UP (ref 32–37)
MCHC RBC-ENTMCNC: 33.4 G/DL — SIGNIFICANT CHANGE UP (ref 32–37)
MCV RBC AUTO: 92 FL — SIGNIFICANT CHANGE UP (ref 81–99)
MCV RBC AUTO: 92 FL — SIGNIFICANT CHANGE UP (ref 81–99)
MONOCYTES # BLD AUTO: 1.2 K/UL — HIGH (ref 0.1–0.6)
MONOCYTES # BLD AUTO: 1.2 K/UL — HIGH (ref 0.1–0.6)
MONOCYTES NFR BLD AUTO: 10.9 % — HIGH (ref 1.7–9.3)
MONOCYTES NFR BLD AUTO: 10.9 % — HIGH (ref 1.7–9.3)
NEUTROPHILS # BLD AUTO: 8 K/UL — HIGH (ref 1.4–6.5)
NEUTROPHILS # BLD AUTO: 8 K/UL — HIGH (ref 1.4–6.5)
NEUTROPHILS NFR BLD AUTO: 72.9 % — SIGNIFICANT CHANGE UP (ref 42.2–75.2)
NEUTROPHILS NFR BLD AUTO: 72.9 % — SIGNIFICANT CHANGE UP (ref 42.2–75.2)
NRBC # BLD: 0 /100 WBCS — SIGNIFICANT CHANGE UP (ref 0–0)
NRBC # BLD: 0 /100 WBCS — SIGNIFICANT CHANGE UP (ref 0–0)
PLATELET # BLD AUTO: 259 K/UL — SIGNIFICANT CHANGE UP (ref 130–400)
PLATELET # BLD AUTO: 259 K/UL — SIGNIFICANT CHANGE UP (ref 130–400)
PMV BLD: 11.8 FL — HIGH (ref 7.4–10.4)
PMV BLD: 11.8 FL — HIGH (ref 7.4–10.4)
POTASSIUM SERPL-MCNC: 5.3 MMOL/L — HIGH (ref 3.5–5)
POTASSIUM SERPL-MCNC: 5.3 MMOL/L — HIGH (ref 3.5–5)
POTASSIUM SERPL-SCNC: 5.3 MMOL/L — HIGH (ref 3.5–5)
POTASSIUM SERPL-SCNC: 5.3 MMOL/L — HIGH (ref 3.5–5)
PROT SERPL-MCNC: 6 G/DL — SIGNIFICANT CHANGE UP (ref 6–8)
PROT SERPL-MCNC: 6 G/DL — SIGNIFICANT CHANGE UP (ref 6–8)
RBC # BLD: 3.77 M/UL — LOW (ref 4.2–5.4)
RBC # BLD: 3.77 M/UL — LOW (ref 4.2–5.4)
RBC # FLD: 11.9 % — SIGNIFICANT CHANGE UP (ref 11.5–14.5)
RBC # FLD: 11.9 % — SIGNIFICANT CHANGE UP (ref 11.5–14.5)
SODIUM SERPL-SCNC: 137 MMOL/L — SIGNIFICANT CHANGE UP (ref 135–146)
SODIUM SERPL-SCNC: 137 MMOL/L — SIGNIFICANT CHANGE UP (ref 135–146)
WBC # BLD: 10.98 K/UL — HIGH (ref 4.8–10.8)
WBC # BLD: 10.98 K/UL — HIGH (ref 4.8–10.8)
WBC # FLD AUTO: 10.98 K/UL — HIGH (ref 4.8–10.8)
WBC # FLD AUTO: 10.98 K/UL — HIGH (ref 4.8–10.8)

## 2024-01-06 PROCEDURE — 99233 SBSQ HOSP IP/OBS HIGH 50: CPT

## 2024-01-06 RX ADMIN — Medication 3 MILLIGRAM(S): at 21:04

## 2024-01-06 RX ADMIN — CINACALCET 30 MILLIGRAM(S): 30 TABLET, FILM COATED ORAL at 13:13

## 2024-01-06 RX ADMIN — ENOXAPARIN SODIUM 40 MILLIGRAM(S): 100 INJECTION SUBCUTANEOUS at 13:13

## 2024-01-06 RX ADMIN — Medication 3 MILLILITER(S): at 08:11

## 2024-01-06 RX ADMIN — Medication 81 MILLIGRAM(S): at 13:13

## 2024-01-06 RX ADMIN — Medication 25 MILLIGRAM(S): at 05:16

## 2024-01-06 RX ADMIN — ATORVASTATIN CALCIUM 40 MILLIGRAM(S): 80 TABLET, FILM COATED ORAL at 21:04

## 2024-01-06 RX ADMIN — Medication 40 MILLIGRAM(S): at 13:13

## 2024-01-06 RX ADMIN — Medication 30 MILLIGRAM(S): at 05:16

## 2024-01-06 RX ADMIN — Medication 25 MILLIGRAM(S): at 17:29

## 2024-01-06 RX ADMIN — CHLORHEXIDINE GLUCONATE 1 APPLICATION(S): 213 SOLUTION TOPICAL at 05:17

## 2024-01-06 RX ADMIN — Medication 20 MILLIGRAM(S): at 05:16

## 2024-01-06 RX ADMIN — Medication 3 MILLILITER(S): at 11:23

## 2024-01-06 RX ADMIN — SODIUM CHLORIDE 4 MILLILITER(S): 9 INJECTION INTRAMUSCULAR; INTRAVENOUS; SUBCUTANEOUS at 08:11

## 2024-01-06 NOTE — PROGRESS NOTE ADULT - ASSESSMENT
Assessment	  89F PMH: HTN, hypothyroidism, hyperparathyroidism who is admitted for multiple acute diagnosis:  Pt seen and examined this morning. Case and Plan discussed at rounds.   Pt is doing well today and is AOx4 and understands her diagnosis   She does not want heroic life saving measures and is DNR/DNI    Acute Issues on current admission:  - Influenza A PNA (Droplet Isolation) (complete course tamiflue and taper down steroids)    - Suspected bacterial PNA superimposed (transition with oral abx to complete 7days total - d/c zosyn start Augmentin po)  - Acute Hypoxic Respiratory Failure (titrate down o2 goal 95%)  - Acute NSTEMI - c/w ASA/Statins/BB/  - Acute HFrEF EF 30-35% and pt DNR/DNI - c/w lasix 20mg oral / strict I/Os / daily wts   - Discussed with CV and EP plan is GDMT and f/u outpt for repeat TTE in 3months - may benefit from Entresto however BP marginal today  - Not a candidate for Life Vest   - Titrate off O2 if possible on 2L NC goal sats 95%   - PT recommends: STR      HTN   - controlled   - c/w meds     Hypothyroidism.   - c/w synthroid     H/O hyperparathyroidism.   - c/w Sensipar 30 po qd     DVT prophylaxis - sq lovenox  GI Proph Protonix    Dispo: STR / d/c planning / titrate down O2 at 2L NC

## 2024-01-06 NOTE — PROGRESS NOTE ADULT - SUBJECTIVE AND OBJECTIVE BOX
24H events:    Patient is a 89y old Female who presents with a chief complaint of shortness of breath (04 Jan 2024 12:36)    Admitted for diagnosis of Influenza A    Today is hospital day 9d.   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    H/O total knee replacement, bilateral    H/O carpal tunnel repair B/L      ALLERGIES:  No Known Allergies    MEDICATIONS:  MEDICATIONS  (STANDING):  albuterol/ipratropium for Nebulization 3 milliLiter(s) Nebulizer every 4 hours  aspirin enteric coated 81 milliGRAM(s) Oral daily  atorvastatin 40 milliGRAM(s) Oral at bedtime  chlorhexidine 2% Cloths 1 Application(s) Topical <User Schedule>  cinacalcet 30 milliGRAM(s) Oral daily  enoxaparin Injectable 40 milliGRAM(s) SubCutaneous every 24 hours  furosemide    Tablet 20 milliGRAM(s) Oral daily  melatonin 3 milliGRAM(s) Oral at bedtime  methylPREDNISolone sodium succinate Injectable 40 milliGRAM(s) IV Push every 24 hours  metoprolol tartrate 25 milliGRAM(s) Oral every 12 hours  oseltamivir 30 milliGRAM(s) Oral two times a day  sodium chloride 3%  Inhalation 4 milliLiter(s) Inhalation every 12 hours    MEDICATIONS  (PRN):  acetaminophen     Tablet .. 650 milliGRAM(s) Oral every 6 hours PRN Moderate Pain (4 - 6)    VITALS:     T(C): 36.4 (06 Jan 2024 12:32), Max: 36.6 (05 Jan 2024 22:00)  T(F): 97.6 (06 Jan 2024 12:32), Max: 97.8 (05 Jan 2024 22:00)  HR: 65 (06 Jan 2024 12:32) (64 - 77)  BP: 123/58 (06 Jan 2024 12:32) (113/68 - 135/58)  RR: 18 (06 Jan 2024 12:32) (18 - 18)  SpO2: 100% (06 Jan 2024 12:32) (98% - 100%)    Parameters below as of 06 Jan 2024 04:16  Patient On (Oxygen Delivery Method): nasal cannula  O2 Flow (L/min): 2    PHYSICAL EXAM:  GENERAL: NAD, Lying in bed comfortably       HEART:  +S1/S2, RRR  LUNGS: B/L Crackles  ABDOMEN:  +BS Soft NT ND   EXTREMITIES: Normal   no edema   NERVOUS SYSTEM:   A&Ox4     SKIN: No rashes or lesions         LABS:                        11.6   10.98 )-----------( 259      ( 06 Jan 2024 06:57 )             34.7     01-06    137  |  98  |  44<H>  ----------------------------<  91  5.3<H>   |  27  |  1.0    Ca    9.5      06 Jan 2024 06:57    Mg     2.2     01-06    TPro  6.0  /  Alb  3.3<L>  /  TBili  0.7  /  DBili  x   /  AST  32  /  ALT  22  /  AlkPhos  61  01-06                        11.5   12.35 )-----------( 222      ( 05 Jan 2024 07:38 )             35.1     01-05    140  |  99  |  45<H>  ----------------------------<  93  4.0   |  31  |  1.0    Ca    9.6      05 Jan 2024 07:38  Mg     2.1     01-05    TPro  5.9<L>  /  Alb  3.5  /  TBili  0.8  /  DBili  x   /  AST  22  /  ALT  22  /  AlkPhos  62  01-05      Urinalysis Basic - ( 05 Jan 2024 07:38 )    Color: x / Appearance: x / SG: x / pH: x  Gluc: 93 mg/dL / Ketone: x  / Bili: x / Urobili: x   Blood: x / Protein: x / Nitrite: x   Leuk Esterase: x / RBC: x / WBC x   Sq Epi: x / Non Sq Epi: x / Bacteria: x    RADIOLOGY: reviewed

## 2024-01-07 LAB
ALBUMIN SERPL ELPH-MCNC: 3.4 G/DL — LOW (ref 3.5–5.2)
ALBUMIN SERPL ELPH-MCNC: 3.4 G/DL — LOW (ref 3.5–5.2)
ALP SERPL-CCNC: 63 U/L — SIGNIFICANT CHANGE UP (ref 30–115)
ALP SERPL-CCNC: 63 U/L — SIGNIFICANT CHANGE UP (ref 30–115)
ALT FLD-CCNC: 23 U/L — SIGNIFICANT CHANGE UP (ref 0–41)
ALT FLD-CCNC: 23 U/L — SIGNIFICANT CHANGE UP (ref 0–41)
ANION GAP SERPL CALC-SCNC: 12 MMOL/L — SIGNIFICANT CHANGE UP (ref 7–14)
ANION GAP SERPL CALC-SCNC: 12 MMOL/L — SIGNIFICANT CHANGE UP (ref 7–14)
AST SERPL-CCNC: 21 U/L — SIGNIFICANT CHANGE UP (ref 0–41)
AST SERPL-CCNC: 21 U/L — SIGNIFICANT CHANGE UP (ref 0–41)
BASOPHILS # BLD AUTO: 0.01 K/UL — SIGNIFICANT CHANGE UP (ref 0–0.2)
BASOPHILS # BLD AUTO: 0.01 K/UL — SIGNIFICANT CHANGE UP (ref 0–0.2)
BASOPHILS NFR BLD AUTO: 0.1 % — SIGNIFICANT CHANGE UP (ref 0–1)
BASOPHILS NFR BLD AUTO: 0.1 % — SIGNIFICANT CHANGE UP (ref 0–1)
BILIRUB SERPL-MCNC: 0.5 MG/DL — SIGNIFICANT CHANGE UP (ref 0.2–1.2)
BILIRUB SERPL-MCNC: 0.5 MG/DL — SIGNIFICANT CHANGE UP (ref 0.2–1.2)
BUN SERPL-MCNC: 51 MG/DL — HIGH (ref 10–20)
BUN SERPL-MCNC: 51 MG/DL — HIGH (ref 10–20)
CALCIUM SERPL-MCNC: 9.9 MG/DL — SIGNIFICANT CHANGE UP (ref 8.4–10.5)
CALCIUM SERPL-MCNC: 9.9 MG/DL — SIGNIFICANT CHANGE UP (ref 8.4–10.5)
CHLORIDE SERPL-SCNC: 99 MMOL/L — SIGNIFICANT CHANGE UP (ref 98–110)
CHLORIDE SERPL-SCNC: 99 MMOL/L — SIGNIFICANT CHANGE UP (ref 98–110)
CO2 SERPL-SCNC: 30 MMOL/L — SIGNIFICANT CHANGE UP (ref 17–32)
CO2 SERPL-SCNC: 30 MMOL/L — SIGNIFICANT CHANGE UP (ref 17–32)
CREAT SERPL-MCNC: 1.1 MG/DL — SIGNIFICANT CHANGE UP (ref 0.7–1.5)
CREAT SERPL-MCNC: 1.1 MG/DL — SIGNIFICANT CHANGE UP (ref 0.7–1.5)
EGFR: 45 ML/MIN/1.73M2 — LOW
EGFR: 45 ML/MIN/1.73M2 — LOW
EOSINOPHIL # BLD AUTO: 0.04 K/UL — SIGNIFICANT CHANGE UP (ref 0–0.7)
EOSINOPHIL # BLD AUTO: 0.04 K/UL — SIGNIFICANT CHANGE UP (ref 0–0.7)
EOSINOPHIL NFR BLD AUTO: 0.4 % — SIGNIFICANT CHANGE UP (ref 0–8)
EOSINOPHIL NFR BLD AUTO: 0.4 % — SIGNIFICANT CHANGE UP (ref 0–8)
GLUCOSE SERPL-MCNC: 106 MG/DL — HIGH (ref 70–99)
GLUCOSE SERPL-MCNC: 106 MG/DL — HIGH (ref 70–99)
HCT VFR BLD CALC: 34.5 % — LOW (ref 37–47)
HCT VFR BLD CALC: 34.5 % — LOW (ref 37–47)
HGB BLD-MCNC: 11.5 G/DL — LOW (ref 12–16)
HGB BLD-MCNC: 11.5 G/DL — LOW (ref 12–16)
IMM GRANULOCYTES NFR BLD AUTO: 0.9 % — HIGH (ref 0.1–0.3)
IMM GRANULOCYTES NFR BLD AUTO: 0.9 % — HIGH (ref 0.1–0.3)
LYMPHOCYTES # BLD AUTO: 0.97 K/UL — LOW (ref 1.2–3.4)
LYMPHOCYTES # BLD AUTO: 0.97 K/UL — LOW (ref 1.2–3.4)
LYMPHOCYTES # BLD AUTO: 9 % — LOW (ref 20.5–51.1)
LYMPHOCYTES # BLD AUTO: 9 % — LOW (ref 20.5–51.1)
MAGNESIUM SERPL-MCNC: 2.3 MG/DL — SIGNIFICANT CHANGE UP (ref 1.8–2.4)
MAGNESIUM SERPL-MCNC: 2.3 MG/DL — SIGNIFICANT CHANGE UP (ref 1.8–2.4)
MCHC RBC-ENTMCNC: 30.8 PG — SIGNIFICANT CHANGE UP (ref 27–31)
MCHC RBC-ENTMCNC: 30.8 PG — SIGNIFICANT CHANGE UP (ref 27–31)
MCHC RBC-ENTMCNC: 33.3 G/DL — SIGNIFICANT CHANGE UP (ref 32–37)
MCHC RBC-ENTMCNC: 33.3 G/DL — SIGNIFICANT CHANGE UP (ref 32–37)
MCV RBC AUTO: 92.5 FL — SIGNIFICANT CHANGE UP (ref 81–99)
MCV RBC AUTO: 92.5 FL — SIGNIFICANT CHANGE UP (ref 81–99)
MONOCYTES # BLD AUTO: 0.72 K/UL — HIGH (ref 0.1–0.6)
MONOCYTES # BLD AUTO: 0.72 K/UL — HIGH (ref 0.1–0.6)
MONOCYTES NFR BLD AUTO: 6.7 % — SIGNIFICANT CHANGE UP (ref 1.7–9.3)
MONOCYTES NFR BLD AUTO: 6.7 % — SIGNIFICANT CHANGE UP (ref 1.7–9.3)
NEUTROPHILS # BLD AUTO: 8.9 K/UL — HIGH (ref 1.4–6.5)
NEUTROPHILS # BLD AUTO: 8.9 K/UL — HIGH (ref 1.4–6.5)
NEUTROPHILS NFR BLD AUTO: 82.9 % — HIGH (ref 42.2–75.2)
NEUTROPHILS NFR BLD AUTO: 82.9 % — HIGH (ref 42.2–75.2)
NRBC # BLD: 0 /100 WBCS — SIGNIFICANT CHANGE UP (ref 0–0)
NRBC # BLD: 0 /100 WBCS — SIGNIFICANT CHANGE UP (ref 0–0)
PLATELET # BLD AUTO: 260 K/UL — SIGNIFICANT CHANGE UP (ref 130–400)
PLATELET # BLD AUTO: 260 K/UL — SIGNIFICANT CHANGE UP (ref 130–400)
PMV BLD: 11.5 FL — HIGH (ref 7.4–10.4)
PMV BLD: 11.5 FL — HIGH (ref 7.4–10.4)
POTASSIUM SERPL-MCNC: 4.6 MMOL/L — SIGNIFICANT CHANGE UP (ref 3.5–5)
POTASSIUM SERPL-MCNC: 4.6 MMOL/L — SIGNIFICANT CHANGE UP (ref 3.5–5)
POTASSIUM SERPL-SCNC: 4.6 MMOL/L — SIGNIFICANT CHANGE UP (ref 3.5–5)
POTASSIUM SERPL-SCNC: 4.6 MMOL/L — SIGNIFICANT CHANGE UP (ref 3.5–5)
PROT SERPL-MCNC: 5.9 G/DL — LOW (ref 6–8)
PROT SERPL-MCNC: 5.9 G/DL — LOW (ref 6–8)
RBC # BLD: 3.73 M/UL — LOW (ref 4.2–5.4)
RBC # BLD: 3.73 M/UL — LOW (ref 4.2–5.4)
RBC # FLD: 11.9 % — SIGNIFICANT CHANGE UP (ref 11.5–14.5)
RBC # FLD: 11.9 % — SIGNIFICANT CHANGE UP (ref 11.5–14.5)
SODIUM SERPL-SCNC: 141 MMOL/L — SIGNIFICANT CHANGE UP (ref 135–146)
SODIUM SERPL-SCNC: 141 MMOL/L — SIGNIFICANT CHANGE UP (ref 135–146)
WBC # BLD: 10.74 K/UL — SIGNIFICANT CHANGE UP (ref 4.8–10.8)
WBC # BLD: 10.74 K/UL — SIGNIFICANT CHANGE UP (ref 4.8–10.8)
WBC # FLD AUTO: 10.74 K/UL — SIGNIFICANT CHANGE UP (ref 4.8–10.8)
WBC # FLD AUTO: 10.74 K/UL — SIGNIFICANT CHANGE UP (ref 4.8–10.8)

## 2024-01-07 PROCEDURE — 99233 SBSQ HOSP IP/OBS HIGH 50: CPT

## 2024-01-07 RX ADMIN — Medication 3 MILLILITER(S): at 19:38

## 2024-01-07 RX ADMIN — Medication 20 MILLIGRAM(S): at 05:55

## 2024-01-07 RX ADMIN — Medication 20 MILLIGRAM(S): at 05:54

## 2024-01-07 RX ADMIN — Medication 3 MILLILITER(S): at 05:32

## 2024-01-07 RX ADMIN — Medication 25 MILLIGRAM(S): at 05:54

## 2024-01-07 RX ADMIN — ENOXAPARIN SODIUM 40 MILLIGRAM(S): 100 INJECTION SUBCUTANEOUS at 11:25

## 2024-01-07 RX ADMIN — CHLORHEXIDINE GLUCONATE 1 APPLICATION(S): 213 SOLUTION TOPICAL at 05:54

## 2024-01-07 RX ADMIN — Medication 3 MILLIGRAM(S): at 21:51

## 2024-01-07 RX ADMIN — Medication 81 MILLIGRAM(S): at 11:25

## 2024-01-07 RX ADMIN — CINACALCET 30 MILLIGRAM(S): 30 TABLET, FILM COATED ORAL at 11:25

## 2024-01-07 RX ADMIN — Medication 25 MILLIGRAM(S): at 17:39

## 2024-01-07 RX ADMIN — ATORVASTATIN CALCIUM 40 MILLIGRAM(S): 80 TABLET, FILM COATED ORAL at 21:51

## 2024-01-07 NOTE — PROGRESS NOTE ADULT - SUBJECTIVE AND OBJECTIVE BOX
24H events:    Patient is a 89y old Female who presents with a chief complaint of shortness of breath (04 Jan 2024 12:36)    Admitted for diagnosis of Influenza A    Today is hospital day 10 d.     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    H/O total knee replacement, bilateral    H/O carpal tunnel repair B/L      ALLERGIES:  No Known Allergies    MEDICATIONS:  MEDICATIONS  (STANDING):  albuterol/ipratropium for Nebulization 3 milliLiter(s) Nebulizer every 4 hours  aspirin enteric coated 81 milliGRAM(s) Oral daily  atorvastatin 40 milliGRAM(s) Oral at bedtime  chlorhexidine 2% Cloths 1 Application(s) Topical <User Schedule>  cinacalcet 30 milliGRAM(s) Oral daily  enoxaparin Injectable 40 milliGRAM(s) SubCutaneous every 24 hours  furosemide    Tablet 20 milliGRAM(s) Oral daily  melatonin 3 milliGRAM(s) Oral at bedtime  methylPREDNISolone sodium succinate Injectable 40 milliGRAM(s) IV Push every 24 hours  metoprolol tartrate 25 milliGRAM(s) Oral every 12 hours  oseltamivir 30 milliGRAM(s) Oral two times a day  sodium chloride 3%  Inhalation 4 milliLiter(s) Inhalation every 12 hours    MEDICATIONS  (PRN):  acetaminophen     Tablet .. 650 milliGRAM(s) Oral every 6 hours PRN Moderate Pain (4 - 6)    VITALS:     Vital Signs Last 24 Hrs  T(C): 35.9 (07 Jan 2024 13:26), Max: 36.5 (06 Jan 2024 20:20)  T(F): 96.7 (07 Jan 2024 13:26), Max: 97.7 (06 Jan 2024 20:20)  HR: 58 (07 Jan 2024 13:26) (55 - 67)  BP: 120/55 (07 Jan 2024 13:26) (114/51 - 126/55)  RR: 18 (07 Jan 2024 13:26) (18 - 18)  SpO2: 99% (07 Jan 2024 08:01) (99% - 100%)    Parameters below as of 07 Jan 2024 08:01  Patient On (Oxygen Delivery Method): nasal cannula  O2 Flow (L/min): 2    PHYSICAL EXAM:  GENERAL: NAD, Lying in bed comfortably       HEART:  +S1/S2, RRR  LUNGS: B/L Crackles  ABDOMEN:  +BS Soft NT ND   EXTREMITIES: Normal   no edema   NERVOUS SYSTEM:   A&Ox4     SKIN: No rashes or lesions         LABS:                          11.5   10.74 )-----------( 260      ( 07 Jan 2024 08:42 )             34.5     01-07    141  |  99  |  51<H>  ----------------------------<  106<H>  4.6   |  30  |  1.1    Ca    9.9      07 Jan 2024 08:42  Mg     2.3     01-07    TPro  5.9<L>  /  Alb  3.4<L>  /  TBili  0.5  /  DBili  x   /  AST  21  /  ALT  23  /  AlkPhos  63  01-07                          11.6   10.98 )-----------( 259      ( 06 Jan 2024 06:57 )             34.7     01-06    137  |  98  |  44<H>  ----------------------------<  91  5.3<H>   |  27  |  1.0    Ca    9.5      06 Jan 2024 06:57    Mg     2.2     01-06    TPro  6.0  /  Alb  3.3<L>  /  TBili  0.7  /  DBili  x   /  AST  32  /  ALT  22  /  AlkPhos  61  01-06                        11.5   12.35 )-----------( 222      ( 05 Jan 2024 07:38 )             35.1     01-05    140  |  99  |  45<H>  ----------------------------<  93  4.0   |  31  |  1.0    Ca    9.6      05 Jan 2024 07:38  Mg     2.1     01-05    TPro  5.9<L>  /  Alb  3.5  /  TBili  0.8  /  DBili  x   /  AST  22  /  ALT  22  /  AlkPhos  62  01-05    Urinalysis Basic - ( 05 Jan 2024 07:38 )    Color: x / Appearance: x / SG: x / pH: x  Gluc: 93 mg/dL / Ketone: x  / Bili: x / Urobili: x   Blood: x / Protein: x / Nitrite: x   Leuk Esterase: x / RBC: x / WBC x   Sq Epi: x / Non Sq Epi: x / Bacteria: x    RADIOLOGY: reviewed

## 2024-01-07 NOTE — PROGRESS NOTE ADULT - SUBJECTIVE AND OBJECTIVE BOX
24H events:    Patient is a 89y old Female who presents with a chief complaint of shortness of breath (06 Jan 2024 13:15)    Primary diagnosis of Influenza A      Day 1:  Day 2:  Day 3:     Today is hospital day 10d. This morning patient was seen and examined at bedside, resting comfortably in bed.    No acute or major events overnight.    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    H/O total knee replacement, bilateral    H/O carpal tunnel repair  B/L      SOCIAL HISTORY:  Social History:      ALLERGIES:  No Known Allergies    MEDICATIONS:  STANDING MEDICATIONS  albuterol/ipratropium for Nebulization 3 milliLiter(s) Nebulizer every 4 hours  aspirin enteric coated 81 milliGRAM(s) Oral daily  atorvastatin 40 milliGRAM(s) Oral at bedtime  chlorhexidine 2% Cloths 1 Application(s) Topical <User Schedule>  cinacalcet 30 milliGRAM(s) Oral daily  enoxaparin Injectable 40 milliGRAM(s) SubCutaneous every 24 hours  furosemide    Tablet 20 milliGRAM(s) Oral daily  melatonin 3 milliGRAM(s) Oral at bedtime  metoprolol tartrate 25 milliGRAM(s) Oral every 12 hours  predniSONE   Tablet 20 milliGRAM(s) Oral daily  sodium chloride 3%  Inhalation 4 milliLiter(s) Inhalation every 12 hours    PRN MEDICATIONS  acetaminophen     Tablet .. 650 milliGRAM(s) Oral every 6 hours PRN    VITALS:   T(F): 97.7  HR: 55  BP: 126/55  RR: 18  SpO2: 100%    PHYSICAL EXAM: deferred  GENERAL:   (  ) NAD, lying in bed comfortably     (  ) obtunded     (  ) lethargic     (  ) somnolent    HEAD:   (  ) Atraumatic     (  ) hematoma     (  ) laceration (specify location:       )     NECK:  (  ) Supple     (  ) neck stiffness     (  ) nuchal rigidity     (  )  no JVD     (  ) JVD present ( -- cm)    HEART:  Rate -->     (  ) normal rate     (  ) bradycardic     (  ) tachycardic  Rhythm -->     (  ) regular     (  ) regularly irregular     (  ) irregularly irregular  Murmurs -->     (  ) normal s1s2     (  ) systolic murmur     (  ) diastolic murmur     (  ) continuous murmur      (  ) S3 present     (  ) S4 present    LUNGS:   (  )Unlabored respirations     (  ) tachypnea  (  ) B/L air entry     (  ) decreased breath sounds in:  (location     )    (  ) no adventitious sound     (  ) crackles     (  ) wheezing      (  ) rhonchi      (specify location:       )  (  ) chest wall tenderness (specify location:       )    ABDOMEN:   (  ) Soft     (  ) tense   |   (  ) nondistended     (  ) distended   |   (  ) +BS     (  ) hypoactive bowel sounds     (  ) hyperactive bowel sounds  (  ) nontender     (  ) RUQ tenderness     (  ) RLQ tenderness     (  ) LLQ tenderness     (  ) epigastric tenderness     (  ) diffuse tenderness  (  ) Splenomegaly      (  ) Hepatomegaly      (  ) Jaundice     (  ) ecchymosis     EXTREMITIES:  (  ) Normal     (  ) Rash     (  ) ecchymosis     (  ) varicose veins      (  ) pitting edema     (  ) non-pitting edema   (  ) ulceration     (  ) gangrene:     (location:     )    NERVOUS SYSTEM:    (  ) A&Ox3     (  ) confused     (  ) lethargic  CN II-XII:     (  ) Intact     (  ) deficits found     (Specify:     )   Upper extremities:     (  ) no sensorimotor deficits     (  ) weakness     (  ) loss of proprioception/vibration     (  ) loss of touch/temperature (specify:    )  Lower extremities:     (  ) no sensorimotor deficits     (  ) weakness     (  ) loss of proprioception/vibration     (  ) loss of touch/temperature (specify:    )    SKIN:   (  ) No rashes or lesions     (  ) maculopapular rash     (  ) pustules     (  ) vesicles     (  ) ulcer     (  ) ecchymosis     (specify location:     )    AMPAC score:    (  ) Indwelling Amaya Catheter:   Date insterted:    Reason (  ) Critical illness     (  ) urinary retention    (  ) Accurate Ins/Outs Monitoring     (  ) CMO patient    (  ) Central Line:   Date inserted:  Location: (  ) Right IJ     (  ) Left IJ     (  ) Right Fem     (  ) Left Fem    (  ) SPC        (  ) pigtail       (  ) PEG tube       (  ) colostomy       (  ) jejunostomy  (  ) U-Dall    LABS:                        11.6   10.98 )-----------( 259      ( 06 Jan 2024 06:57 )             34.7     01-06    137  |  98  |  44<H>  ----------------------------<  91  5.3<H>   |  27  |  1.0    Ca    9.5      06 Jan 2024 06:57  Mg     2.2     01-06    TPro  6.0  /  Alb  3.3<L>  /  TBili  0.7  /  DBili  x   /  AST  32  /  ALT  22  /  AlkPhos  61  01-06      Urinalysis Basic - ( 06 Jan 2024 06:57 )    Color: x / Appearance: x / SG: x / pH: x  Gluc: 91 mg/dL / Ketone: x  / Bili: x / Urobili: x   Blood: x / Protein: x / Nitrite: x   Leuk Esterase: x / RBC: x / WBC x   Sq Epi: x / Non Sq Epi: x / Bacteria: x                RADIOLOGY:

## 2024-01-07 NOTE — PROGRESS NOTE ADULT - ASSESSMENT
Assessment	  89F PMH: HTN, hypothyroidism, hyperparathyroidism who is admitted for multiple acute diagnosis:  Pt seen and examined this morning. Case and Plan discussed at rounds.   Pt is doing well today and is AOx4 and understands her diagnosis   She does not want heroic life saving measures and is DNR/DNI    Acute Issues on current admission:  - Influenza A PNA (Droplet Isolation) (complete course tamiflue and taper down steroids)    - Suspected bacterial PNA superimposed (transition with oral abx to complete 7days total - d/c zosyn start Augmentin po)  - Acute Hypoxic Respiratory Failure (titrate down o2 goal 95%)  - Acute NSTEMI - c/w ASA/Statins/BB/  - Acute HFrEF EF 30-35% and pt DNR/DNI - D/C lasix 20mg oral (pt is dry) / strict I/Os / daily wts   - Discussed with CV and EP plan is GDMT and f/u outpt for repeat TTE in 3months - may benefit from Entresto however BP marginal today  - Not a candidate for Life Vest   - Titrate off O2 and asked RN to document ambulatory saturations on RA   - PT recommends: STR   - Today seems the plan is Home with VNS/PT      HTN   - controlled   - c/w meds     Hypothyroidism.   - c/w synthroid     H/O hyperparathyroidism.   - c/w Sensipar 30 po qd     DVT prophylaxis - sq lovenox  GI Proph Protonix    Family Discussion: Son at bedside yesterday and brother Cristhian [phone]. Family in agreement for d/c planning possibly home w/ VNS vs STR   Dispo: Home w/ VNS / f/u Ambulatory sats RA  Assessment	  89F PMH: HTN, hypothyroidism, hyperparathyroidism who is admitted for multiple acute diagnosis:  Pt seen and examined this morning. Case and Plan discussed at rounds.   Pt is doing well today and is AOx4 and understands her diagnosis   She does not want heroic life saving measures and is DNR/DNI    Acute Issues on current admission:  - Influenza A PNA (Droplet Isolation) (complete course tamiflue and taper down steroids)    - Suspected bacterial PNA superimposed (transition with oral abx to complete 7days total - d/c zosyn start Augmentin po)  - Acute Hypoxic Respiratory Failure (titrate down o2 goal 95%)  - Acute NSTEMI - c/w ASA/Statins/BB/  - Acute HFrEF EF 30-35% and pt DNR/DNI - D/C lasix 20mg oral (pt is dry) / strict I/Os / daily wts   - Discussed with CV and EP plan is GDMT and f/u outpt for repeat TTE in 3months - may benefit from Entresto however BP marginal today  - Not a candidate for Life Vest   - Titrate off O2 and asked RN to document ambulatory saturations on RA   - PT recommends: STR   - Today seems the plan is Home with VNS/PT      HTN   - controlled   - c/w meds     Hypothyroidism.   - c/w synthroid     H/O hyperparathyroidism.   - c/w Sensipar 30 po qd     DVT prophylaxis - sq lovenox  GI Proph Protonix    Family Discussion: Son at bedside yesterday and brother Cristhian [phone]. Family in agreement for d/c planning possibly home w/ VNS vs STR   Dispo: Home w/ VNS / f/u Ambulatory sats RA

## 2024-01-07 NOTE — PROGRESS NOTE ADULT - ASSESSMENT
89yoF PMHx HTN, hypothyroidism, hyperparathyroidism here with sepsis, present on admission, with acute hypoxic resp failure, found to have influenza a. Elevated cardiac enzymes.    #Acute hypoxic resp failure 2/2 due to influenza +/- concomitant bacterial pna  -Xray B/l opacities  -Tamiflu   -Zosyn X 7 dayds   -Lasix 20 po   -solumedrol 40q24    #NSTEMI  #HFrEF  -Troponin elevated  -Echo -EF 30-35% , RWMA   -No chest pain  -EKG noted  -Aspirin 81 mg and Atorvastatin, cardio recommended not to start IV Heparin due to advanced age and elevated risk of bleeding  12/5 - spoke to cardio- c/w current management      HTN   -controlled  -outpt f/u.     #Hypothyroidism.   outpt f/u.    # H/O hyperparathyroidism.    sensipar 30.      DVT ppx - lovenox   GI ppx - protonix   Dispo - dc planning, STR

## 2024-01-08 ENCOUNTER — TRANSCRIPTION ENCOUNTER (OUTPATIENT)
Age: 89
End: 2024-01-08

## 2024-01-08 PROCEDURE — 99239 HOSP IP/OBS DSCHRG MGMT >30: CPT

## 2024-01-08 RX ORDER — ASPIRIN/CALCIUM CARB/MAGNESIUM 324 MG
1 TABLET ORAL
Qty: 0 | Refills: 0 | DISCHARGE
Start: 2024-01-08

## 2024-01-08 RX ORDER — METOPROLOL TARTRATE 50 MG
1 TABLET ORAL
Qty: 0 | Refills: 0 | DISCHARGE
Start: 2024-01-08

## 2024-01-08 RX ORDER — ATORVASTATIN CALCIUM 80 MG/1
1 TABLET, FILM COATED ORAL
Qty: 30 | Refills: 1
Start: 2024-01-08 | End: 2024-03-07

## 2024-01-08 RX ORDER — LOSARTAN POTASSIUM 100 MG/1
1 TABLET, FILM COATED ORAL
Refills: 0 | DISCHARGE

## 2024-01-08 RX ORDER — ATORVASTATIN CALCIUM 80 MG/1
1 TABLET, FILM COATED ORAL
Qty: 0 | Refills: 0 | DISCHARGE
Start: 2024-01-08

## 2024-01-08 RX ORDER — METOPROLOL TARTRATE 50 MG
1 TABLET ORAL
Qty: 60 | Refills: 1
Start: 2024-01-08 | End: 2024-03-07

## 2024-01-08 RX ORDER — ASPIRIN/CALCIUM CARB/MAGNESIUM 324 MG
1 TABLET ORAL
Qty: 30 | Refills: 1
Start: 2024-01-08 | End: 2024-03-07

## 2024-01-08 RX ADMIN — Medication 81 MILLIGRAM(S): at 12:47

## 2024-01-08 RX ADMIN — Medication 3 MILLIGRAM(S): at 21:16

## 2024-01-08 RX ADMIN — Medication 25 MILLIGRAM(S): at 05:45

## 2024-01-08 RX ADMIN — Medication 25 MILLIGRAM(S): at 17:42

## 2024-01-08 RX ADMIN — Medication 3 MILLILITER(S): at 04:20

## 2024-01-08 RX ADMIN — Medication 20 MILLIGRAM(S): at 05:45

## 2024-01-08 RX ADMIN — CHLORHEXIDINE GLUCONATE 1 APPLICATION(S): 213 SOLUTION TOPICAL at 05:46

## 2024-01-08 RX ADMIN — Medication 3 MILLILITER(S): at 12:06

## 2024-01-08 RX ADMIN — CINACALCET 30 MILLIGRAM(S): 30 TABLET, FILM COATED ORAL at 12:47

## 2024-01-08 RX ADMIN — ATORVASTATIN CALCIUM 40 MILLIGRAM(S): 80 TABLET, FILM COATED ORAL at 21:16

## 2024-01-08 RX ADMIN — Medication 3 MILLILITER(S): at 16:33

## 2024-01-08 RX ADMIN — ENOXAPARIN SODIUM 40 MILLIGRAM(S): 100 INJECTION SUBCUTANEOUS at 12:47

## 2024-01-08 RX ADMIN — Medication 3 MILLILITER(S): at 08:25

## 2024-01-08 NOTE — DISCHARGE NOTE PROVIDER - CARE PROVIDER_API CALL
Renee Garcia  Cardiovascular Disease  [address] [place] [address], [facility] [address]  [place] [address]  Phone: [phone]  Fax: [phone]  Follow Up Time: 2 weeks   Renee Garcia  Cardiovascular Disease  [address] [place] [address], [facility] [address]  [place] [address]  Phone: [phone]  Fax: [phone]  Follow Up Time: 2 weeks

## 2024-01-08 NOTE — DISCHARGE NOTE PROVIDER - NSDCCPCAREPLAN_GEN_ALL_CORE_FT
PRINCIPAL DISCHARGE DIAGNOSIS  Diagnosis: Influenza A  Assessment and Plan of Treatment: You presented to the hospital complaining of shortness of breath and were found to be positive for Influenza. This was complicated by a superimposed bacterial infection and acute heart failure. You received antibiotics for the infections and cardiology was consulted for the heart failure. Your medications were adjusted based on the recommendations made by cardiology and will be continued upon discharge. Please take all medications as prescibed. Please follow up with your PCP and cardiology in 1-2 weeks. Please do not hesitate to return to the ER if your symptoms worsen.      SECONDARY DISCHARGE DIAGNOSES  Diagnosis: Hypoxia  Assessment and Plan of Treatment:     Diagnosis: Sepsis  Assessment and Plan of Treatment:     Diagnosis: Elevated troponin  Assessment and Plan of Treatment:      PRINCIPAL DISCHARGE DIAGNOSIS  Diagnosis: Influenza A  Assessment and Plan of Treatment: You presented to the hospital complaining of shortness of breath and were found to be positive for Influenza. This was complicated by a superimposed bacterial infection and acute heart failure. You received antibiotics for the infections and cardiology was consulted for the heart failure. Your medications were adjusted based on the recommendations made by cardiology and will be continued upon discharge. Please take all medications as prescibed. Please follow up with your PCP and cardiology in 1-2 weeks. Please do not hesitate to return to the ER if your symptoms worsen.      SECONDARY DISCHARGE DIAGNOSES  Diagnosis: Hypoxia  Assessment and Plan of Treatment: ACUTE HYPOXIC RESPIRATORY FAILURE 2/2 INFLUENZA A   ACUTE HFpEF - RESOLVED - AT THIS TIME NO NEED FOR LASIX   NSTEMI - C/W ASA/ATORVASTATIN/METOPROLOL  - PER CARDIOLOGY FOLLOW UP IN CLINIC FOR REPEAT ECHO IN 3 MONTHS HOWEVER FOLLOW UP WITH CARDIOLOGY IN 1-2 WEEKS FOR CONSIDERATION OF STARTING ENTRESTO WHICH IS A MEDICATION THAT CAN IMPROVE YOUR HEART FUNCTION. THE MEDICINE WAS NOT STARTED DURING THIS HOSPITALIZATION DUE TO MARGINAL BLOOD PRESSURE.  NO NEED FOR HOME OXYGEN AS YOU ARE AT 99% ROOM AIR OXYGEN SATURATION ON AMBULATION       Diagnosis: Sepsis  Assessment and Plan of Treatment:     Diagnosis: Elevated troponin  Assessment and Plan of Treatment: NSTEMI

## 2024-01-08 NOTE — DISCHARGE NOTE PROVIDER - HOSPITAL COURSE
89-year-old female  PMH HTN, ckd, op, hypothyroidism, hyperparathyroidism   Presenting with fever, URI symptoms, cough, and shortness of breath x 2 days.  Worsened overnight, lives with her sons who states that she was calling them throughout the night complaining that she felt like she could not breathe.  Febrile and hypoxic to 89% on room air on arrival, improved on 2 L nasal cannula.  Denies any headache, sore throat, CP, nausea vomiting diarrhea, abdominal pain, flank pain, urinary symptoms, rash.  Sons endorse recent sick contacts, family members with URI symptoms who she was with over the holidays.    In ED:   - Vital signs: BP:133/ 57  Heart Rate: 117 /min Respiration Rate: 28 /min. Temp (F): 100.5 Degrees F. SpO2 89 % on RA   - Labs were significant for: Hb: 10.7, Trop HS: 620, BNP: 5063, AST:49  - Chest X-ray: Bilateral interstitial opacities and likely biapical thickening, new since distant prior.    Hospital Course:   #Acute hypoxic resp failure 2/2 due to influenza +/- concomitant bacterial pna  - Xray B/l opacities  - S/p Tamiflu   - S/p Zosyn  - Solumedrol tapered to PO Prednisone x 3 days    #NSTEMI  #HFrEF  -Troponin elevated  -Echo -EF 30-35% , RWMA   -No chest pain  -EKG noted  -Aspirin 81 mg and Atorvastatin, cardio recommended not to start IV Heparin due to advanced age and elevated risk of bleeding  - Discussed with CV and EP plan is GDMT and f/u outpt for repeat TTE in 3months - may benefit from Entresto however BP marginal today  - Not a candidate for Life Vest   - S/p Lasix 20mg PO  - c/w ASA/Statins/BB    #HTN   - controlled  - outpt f/u.    #Hypothyroidism.   - outpt f/u.    #Hx of Hyperparathyroidism.   - sensipar 30.    The patient's status has improved and she is medically stable for discharge.

## 2024-01-08 NOTE — DISCHARGE NOTE PROVIDER - CARE PROVIDERS DIRECT ADDRESSES
[email or web address]

## 2024-01-08 NOTE — DISCHARGE NOTE PROVIDER - NSDCMRMEDTOKEN_GEN_ALL_CORE_FT
aspirin 81 mg oral delayed release tablet: 1 tab(s) orally once a day  atorvastatin 40 mg oral tablet: 1 tab(s) orally once a day (at bedtime)  losartan 25 mg oral tablet: 1 tab(s) orally once a day  metoprolol tartrate 25 mg oral tablet: 1 tab(s) orally every 12 hours  Sensipar: 30  orally once a day   aspirin 81 mg oral delayed release tablet: 1 tab(s) orally once a day  atorvastatin 40 mg oral tablet: 1 tab(s) orally once a day (at bedtime)  metoprolol tartrate 25 mg oral tablet: 1 tab(s) orally every 12 hours  Sensipar: 30  orally once a day

## 2024-01-08 NOTE — DISCHARGE NOTE PROVIDER - ATTENDING DISCHARGE PHYSICAL EXAMINATION:
Vital Signs Last 24 Hrs  T(C): 36.1 (08 Jan 2024 12:26), Max: 36.2 (07 Jan 2024 21:00)  T(F): 97 (08 Jan 2024 12:26), Max: 97.2 (08 Jan 2024 04:57)  HR: 79 (08 Jan 2024 12:26) (57 - 79)  BP: 111/66 (08 Jan 2024 12:26) (111/66 - 120/52)  BP(mean): --  RR: 16 (08 Jan 2024 13:30) (16 - 18)  SpO2: 99% (08 Jan 2024 13:31) (79% - 100%)    Parameters below as of 08 Jan 2024 13:31  Patient On (Oxygen Delivery Method): room air  O2 Flow (L/min): 2    GEN; NAD  CV: NL S1/2  RESP: CTA B/L   GI: +BS SOFT NT ND  EXT: NO E/C/C   MS: AOX4                          11.5   10.74 )-----------( 260      ( 07 Jan 2024 08:42 )             34.5   01-07    141  |  99  |  51<H>  ----------------------------<  106<H>  4.6   |  30  |  1.1    Ca    9.9      07 Jan 2024 08:42  Mg     2.3     01-07    TPro  5.9<L>  /  Alb  3.4<L>  /  TBili  0.5  /  DBili  x   /  AST  21  /  ALT  23  /  AlkPhos  63  01-07

## 2024-01-08 NOTE — PROGRESS NOTE ADULT - SUBJECTIVE AND OBJECTIVE BOX
24H events:    Patient is a 89y old Female who presents with a chief complaint of shortness of breath (07 Jan 2024 15:22)    Primary diagnosis of Influenza A    Today is hospital day 11d. This morning patient was seen and examined at bedside, resting comfortably in bed.    No acute or major events overnight.    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    H/O total knee replacement, bilateral    H/O carpal tunnel repair  B/L    SOCIAL HISTORY:  Social History: na    ALLERGIES:  No Known Allergies    MEDICATIONS:  STANDING MEDICATIONS  albuterol/ipratropium for Nebulization 3 milliLiter(s) Nebulizer every 4 hours  aspirin enteric coated 81 milliGRAM(s) Oral daily  atorvastatin 40 milliGRAM(s) Oral at bedtime  chlorhexidine 2% Cloths 1 Application(s) Topical <User Schedule>  cinacalcet 30 milliGRAM(s) Oral daily  enoxaparin Injectable 40 milliGRAM(s) SubCutaneous every 24 hours  melatonin 3 milliGRAM(s) Oral at bedtime  metoprolol tartrate 25 milliGRAM(s) Oral every 12 hours  predniSONE   Tablet 20 milliGRAM(s) Oral daily  sodium chloride 3%  Inhalation 4 milliLiter(s) Inhalation every 12 hours    PRN MEDICATIONS  acetaminophen     Tablet .. 650 milliGRAM(s) Oral every 6 hours PRN      ROS:   Unable to obtain due to pt lethargy     VITALS:   ICU Vital Signs Last 24 Hrs  T(C): 36.2 (08 Jan 2024 04:57), Max: 36.2 (07 Jan 2024 21:00)  T(F): 97.2 (08 Jan 2024 04:57), Max: 97.2 (08 Jan 2024 04:57)  HR: 57 (08 Jan 2024 04:57) (57 - 67)  BP: 120/52 (08 Jan 2024 04:57) (116/55 - 120/55)  BP(mean): --  ABP: --  ABP(mean): --  RR: 18 (08 Jan 2024 04:57) (18 - 18)  SpO2: 79% (07 Jan 2024 17:55) (79% - 99%)    O2 Parameters below as of 07 Jan 2024 17:55  Patient On (Oxygen Delivery Method): room air    PHYSICAL EXAM:  GENERAL: lying in bed, looking lethargic   HEAD: normal  HEART: RRR  LUNGS: CTA b/l  ABDOMEN: soft nontender nondistended  EXTREMITIES: no edema  NERVOUS SYSTEM:  unable to assess MS    Lines: none    LABS:                        11.5   10.74 )-----------( 260      ( 07 Jan 2024 08:42 )             34.5     01-07    141  |  99  |  51<H>  ----------------------------<  106<H>  4.6   |  30  |  1.1    Ca    9.9      07 Jan 2024 08:42  Mg     2.3     01-07    TPro  5.9<L>  /  Alb  3.4<L>  /  TBili  0.5  /  DBili  x   /  AST  21  /  ALT  23  /  AlkPhos  63  01-07      Urinalysis Basic - ( 07 Jan 2024 08:42 )    Color: x / Appearance: x / SG: x / pH: x  Gluc: 106 mg/dL / Ketone: x  / Bili: x / Urobili: x   Blood: x / Protein: x / Nitrite: x   Leuk Esterase: x / RBC: x / WBC x   Sq Epi: x / Non Sq Epi: x / Bacteria: x      RADIOLOGY:    < from: Xray Chest 1 View- PORTABLE-Urgent (Xray Chest 1 View- PORTABLE-Urgent .) (01.05.24 @ 11:37) >    Impression:    Patchy bilateral opacifications without difference.    < end of copied text >

## 2024-01-08 NOTE — DISCHARGE NOTE PROVIDER - NSDCHOSPICE_GEN_A_CORE
[FreeTextEntry1] : Mr. Aldrich has a history of RICHI, abnormal chest CT, bronchiectasis, asthma, ?OSAS, and low vitamin D. He is currently stable from a pulmonary perspective. \par \par His chronic cough is multifactorial due to:\par -bronchiectasis \par -bronchospasm/asthma \par \par problem 1: Bronchiectasis with RICHI (on therapy from May 2018 to November 2019)\par -RICHI seems to have resolved with treatment as sputum culture is negative\par -instructed to continue therapy for 1 more year after clear sputum\par -instructed to follow up for eye exam every 3 months\par -recommended to continue to use Acapella device QD for at least 15 breaths while sitting\par -lab work will be necessary at various intervals and particularly at baseline, including liver function tests, uric acid level, and renal function testing (usually this is every 6 weeks, but will be modified specifically for patient)\par \par problem 2: abnormal chest CT\par -one area of focal bronchiectasis\par -no cultures found and sputum was negative AFB x3 (5/2018)\par -no changes; follow up chest CT in February 2019\par  \par problem 3: bronchiectasis\par -continue to use the acapella device multiple times a day\par Bronchiectasis is a chronic lung disease characterized by dilatation of airways, with injury to the bronchial walls due to recurrent infection and inflammation. It is typically distinguished by whether or not the patient has underlying cystic fibrosis (CF). Adult non-CF bronchiectasis (NCFB) is heterogenous and has numerous causes. idiopathic bronchiectasis and infection related bronchiectasis represent the majority of adult cases of NCFB in most series. The prevalence of NCFB appears to be increasing and prevalence of NCFB increases substantially with aging. \par \par problem 4: bronchospasm/asthma \par -continue to use Anoro at 1 inhalation QD\par -continue to use Arnuity 200 QD\par -Asthma is believed to be caused by inherited (genetic) and environmental factor, but its exact cause is unknown. Asthma may be triggered by allergens, lung infections, or irritants in the air. Asthma triggers are different for each person\par -Inhaler technique reviewed as well as oral hygiene techniques reviewed with patient. Avoidance of cold air, extremes of temperature, rescue inhaler should be used before exercise. Order of medication reviewed with patient. Recommended use of a cool mist humidifier in the bedroom. \par \par problem 5: r/o KEYANNA\par -set up home sleep study\par -recommended to use nasal saline\par -recommended to use Oxy-Aid by [employer]\par \par Sleep apnea is associated with adverse clinical consequences which an affect most organ systems. Cardiovascular disease risk includes arrhythmias, atrial fibrillation, hypertension, coronary artery disease, and stroke. Metabolic disorders include diabetes type 2, non-alcoholic fatty liver disease. Mood disorder especially depression; and cognitive decline especially in the elderly. Associations with chronic reflux/Lema’s esophagus some but not all inclusive. \par -Reasons include arousal consistent with hypopnea; respiratory events most prominent in REM sleep or supine position; therefore sleep staging and body position are important for accurate diagnosis and estimation of AHI.\par \par problem 6: low vitamin D\par -continue to use vitamin D therapy\par -Has been associated with asthma exacerbations and increased allergic symptoms. The goal based on recent information is maintaining levels between 50-70 and low normal is 30. Recommended 50,000 units every two weeks to once a month depending on the level. \par \par problem 7: health maintenance \par -s/p 2018  influenza vaccination\par -recommended to exercise regularly \par -recommended vitamin B, recommended vitamin D, CoQ 10 at 200 mg QD\par -recommended strep pneumonia vaccines: Prevnar-13 vaccine, followed by Pneumo vaccine 23 one year following\par -recommended early intervention for URIs\par -recommended regular osteoporosis evaluations\par -recommended early dermatological evaluations\par -recommended after the age of 50 to the age of 70, colonoscopy every 5 years \par \par F/U in 4 months\par He is encouraged to call with any changes, concerns, or questions.  No

## 2024-01-08 NOTE — PROGRESS NOTE ADULT - ASSESSMENT
89yoF PMHx HTN, hypothyroidism, hyperparathyroidism here with sepsis, present on admission, with acute hypoxic resp failure, found to have influenza a. Elevated cardiac enzymes.    #Acute hypoxic resp failure 2/2 due to influenza +/- concomitant bacterial pna  - Xray B/l opacities  - S/p Tamiflu   - S/p Zosyn  - Solumedrol tapered to PO Prednisone x 3 days     #NSTEMI  #HFrEF  -Troponin elevated  -Echo -EF 30-35% , RWMA   -No chest pain  -EKG noted  -Aspirin 81 mg and Atorvastatin, cardio recommended not to start IV Heparin due to advanced age and elevated risk of bleeding  - Discussed with CV and EP plan is GDMT and f/u outpt for repeat TTE in 3months - may benefit from Entresto however BP marginal today  - Not a candidate for Life Vest   - S/p Lasix 20mg PO  - c/w ASA/Statins/BB    #HTN   - controlled  - outpt f/u.    #Hypothyroidism.   - outpt f/u.    #Hx of Hyperparathyroidism.   - sensipar 30.    DVT ppx - lovenox   GI ppx - protonix   Dispo - dc planning, STR

## 2024-01-09 VITALS
RESPIRATION RATE: 18 BRPM | SYSTOLIC BLOOD PRESSURE: 114 MMHG | TEMPERATURE: 98 F | HEART RATE: 65 BPM | OXYGEN SATURATION: 98 % | DIASTOLIC BLOOD PRESSURE: 52 MMHG

## 2024-01-09 LAB
ANION GAP SERPL CALC-SCNC: 10 MMOL/L — SIGNIFICANT CHANGE UP (ref 7–14)
ANION GAP SERPL CALC-SCNC: 10 MMOL/L — SIGNIFICANT CHANGE UP (ref 7–14)
BUN SERPL-MCNC: 55 MG/DL — HIGH (ref 10–20)
BUN SERPL-MCNC: 55 MG/DL — HIGH (ref 10–20)
CALCIUM SERPL-MCNC: 9.7 MG/DL — SIGNIFICANT CHANGE UP (ref 8.4–10.4)
CALCIUM SERPL-MCNC: 9.7 MG/DL — SIGNIFICANT CHANGE UP (ref 8.4–10.4)
CHLORIDE SERPL-SCNC: 102 MMOL/L — SIGNIFICANT CHANGE UP (ref 98–110)
CHLORIDE SERPL-SCNC: 102 MMOL/L — SIGNIFICANT CHANGE UP (ref 98–110)
CO2 SERPL-SCNC: 27 MMOL/L — SIGNIFICANT CHANGE UP (ref 17–32)
CO2 SERPL-SCNC: 27 MMOL/L — SIGNIFICANT CHANGE UP (ref 17–32)
CREAT SERPL-MCNC: 1 MG/DL — SIGNIFICANT CHANGE UP (ref 0.7–1.5)
CREAT SERPL-MCNC: 1 MG/DL — SIGNIFICANT CHANGE UP (ref 0.7–1.5)
EGFR: 50 ML/MIN/1.73M2 — LOW
EGFR: 50 ML/MIN/1.73M2 — LOW
GLUCOSE SERPL-MCNC: 99 MG/DL — SIGNIFICANT CHANGE UP (ref 70–99)
GLUCOSE SERPL-MCNC: 99 MG/DL — SIGNIFICANT CHANGE UP (ref 70–99)
HCT VFR BLD CALC: 31.6 % — LOW (ref 37–47)
HCT VFR BLD CALC: 31.6 % — LOW (ref 37–47)
HGB BLD-MCNC: 10.5 G/DL — LOW (ref 12–16)
HGB BLD-MCNC: 10.5 G/DL — LOW (ref 12–16)
MAGNESIUM SERPL-MCNC: 2.2 MG/DL — SIGNIFICANT CHANGE UP (ref 1.8–2.4)
MAGNESIUM SERPL-MCNC: 2.2 MG/DL — SIGNIFICANT CHANGE UP (ref 1.8–2.4)
MCHC RBC-ENTMCNC: 31.2 PG — HIGH (ref 27–31)
MCHC RBC-ENTMCNC: 31.2 PG — HIGH (ref 27–31)
MCHC RBC-ENTMCNC: 33.2 G/DL — SIGNIFICANT CHANGE UP (ref 32–37)
MCHC RBC-ENTMCNC: 33.2 G/DL — SIGNIFICANT CHANGE UP (ref 32–37)
MCV RBC AUTO: 93.8 FL — SIGNIFICANT CHANGE UP (ref 81–99)
MCV RBC AUTO: 93.8 FL — SIGNIFICANT CHANGE UP (ref 81–99)
NRBC # BLD: 0 /100 WBCS — SIGNIFICANT CHANGE UP (ref 0–0)
NRBC # BLD: 0 /100 WBCS — SIGNIFICANT CHANGE UP (ref 0–0)
PLATELET # BLD AUTO: 245 K/UL — SIGNIFICANT CHANGE UP (ref 130–400)
PLATELET # BLD AUTO: 245 K/UL — SIGNIFICANT CHANGE UP (ref 130–400)
PMV BLD: 11.8 FL — HIGH (ref 7.4–10.4)
PMV BLD: 11.8 FL — HIGH (ref 7.4–10.4)
POTASSIUM SERPL-MCNC: 4.4 MMOL/L — SIGNIFICANT CHANGE UP (ref 3.5–5)
POTASSIUM SERPL-MCNC: 4.4 MMOL/L — SIGNIFICANT CHANGE UP (ref 3.5–5)
POTASSIUM SERPL-SCNC: 4.4 MMOL/L — SIGNIFICANT CHANGE UP (ref 3.5–5)
POTASSIUM SERPL-SCNC: 4.4 MMOL/L — SIGNIFICANT CHANGE UP (ref 3.5–5)
RBC # BLD: 3.37 M/UL — LOW (ref 4.2–5.4)
RBC # BLD: 3.37 M/UL — LOW (ref 4.2–5.4)
RBC # FLD: 12.4 % — SIGNIFICANT CHANGE UP (ref 11.5–14.5)
RBC # FLD: 12.4 % — SIGNIFICANT CHANGE UP (ref 11.5–14.5)
SODIUM SERPL-SCNC: 139 MMOL/L — SIGNIFICANT CHANGE UP (ref 135–146)
SODIUM SERPL-SCNC: 139 MMOL/L — SIGNIFICANT CHANGE UP (ref 135–146)
WBC # BLD: 10.74 K/UL — SIGNIFICANT CHANGE UP (ref 4.8–10.8)
WBC # BLD: 10.74 K/UL — SIGNIFICANT CHANGE UP (ref 4.8–10.8)
WBC # FLD AUTO: 10.74 K/UL — SIGNIFICANT CHANGE UP (ref 4.8–10.8)
WBC # FLD AUTO: 10.74 K/UL — SIGNIFICANT CHANGE UP (ref 4.8–10.8)

## 2024-01-09 PROCEDURE — 99232 SBSQ HOSP IP/OBS MODERATE 35: CPT

## 2024-01-09 RX ADMIN — Medication 3 MILLILITER(S): at 15:38

## 2024-01-09 RX ADMIN — Medication 25 MILLIGRAM(S): at 06:22

## 2024-01-09 RX ADMIN — Medication 20 MILLIGRAM(S): at 06:21

## 2024-01-09 RX ADMIN — CINACALCET 30 MILLIGRAM(S): 30 TABLET, FILM COATED ORAL at 11:40

## 2024-01-09 RX ADMIN — Medication 81 MILLIGRAM(S): at 11:40

## 2024-01-09 RX ADMIN — Medication 3 MILLILITER(S): at 10:05

## 2024-01-09 RX ADMIN — SODIUM CHLORIDE 4 MILLILITER(S): 9 INJECTION INTRAMUSCULAR; INTRAVENOUS; SUBCUTANEOUS at 10:05

## 2024-01-09 RX ADMIN — CHLORHEXIDINE GLUCONATE 1 APPLICATION(S): 213 SOLUTION TOPICAL at 06:24

## 2024-01-09 RX ADMIN — ENOXAPARIN SODIUM 40 MILLIGRAM(S): 100 INJECTION SUBCUTANEOUS at 11:39

## 2024-01-09 NOTE — PROGRESS NOTE ADULT - ASSESSMENT
89yoF PMHx HTN, hypothyroidism, hyperparathyroidism here with sepsis, present on admission, with acute hypoxic resp failure, found to have influenza a. Elevated cardiac enzymes.    #Acute hypoxic resp failure 2/2 due to influenza +/- concomitant bacterial pna  - Xray B/l opacities  - S/p Tamiflu   - S/p Zosyn  - Solumedrol tapered to PO Prednisone x 3 days, completed on 1/9    #NSTEMI  #HFrEF  -Troponin elevated  -Echo -EF 30-35% , RWMA   -No chest pain  -EKG noted  -Aspirin 81 mg and Atorvastatin, cardio recommended not to start IV Heparin due to advanced age and elevated risk of bleeding  - Discussed with CV and EP plan is GDMT and f/u outpt for repeat TTE in 3months - may benefit from Entresto however BP marginal today  - Not a candidate for Life Vest   - S/p Lasix 20mg PO  - C/w ASA/Statins/BB    #HTN   - controlled  - outpt f/u.    #Hypothyroidism.   - outpt f/u.    #Hx of Hyperparathyroidism.   - sensipar 30.    DVT ppx - lovenox   GI ppx - protonix   Dispo - dc planning

## 2024-01-09 NOTE — PROGRESS NOTE ADULT - PROVIDER SPECIALTY LIST ADULT
Hospitalist
Internal Medicine
Pulmonology
Pulmonology
Critical Care
Internal Medicine
Hospitalist
Pulmonology
Pulmonology
Hospitalist
Critical Care
Hospitalist
Internal Medicine
Hospitalist
Palliative Care
Internal Medicine

## 2024-01-09 NOTE — PROGRESS NOTE ADULT - ASSESSMENT
Assessment	  89F PMH: HTN, hypothyroidism, hyperparathyroidism who is admitted for multiple acute diagnosis:  Pt seen and examined this morning. Case and Plan discussed at rounds.   Pt is doing well today and is AOx4 and understands her diagnosis   She does not want heroic life saving measures and is DNR/DNI    Acute Issues on current admission:  - Influenza A PNA (Droplet Isolation) (complete course tamiflue and taper down steroids)    - Suspected bacterial PNA superimposed (transition with oral abx to complete 7days total - d/c zosyn start Augmentin po)  - Acute Hypoxic Respiratory Failure (titrate down o2 goal 95%)  - Acute NSTEMI - c/w ASA/Statins/BB/  - Acute HFrEF EF 30-35% and pt DNR/DNI - D/C lasix 20mg oral (pt is dry) / strict I/Os / daily wts   - Discussed with CV and EP plan is GDMT and f/u outpt for repeat TTE in 3months - may benefit from Entresto however BP marginal today  - Not a candidate for Life Vest   - Titrate off O2 and asked RN to document ambulatory saturations on RA   - Pt going home with homecare services resumed today per CM okay to d/c home today      HTN   - controlled   - c/w meds     Hypothyroidism.   - c/w synthroid     H/O hyperparathyroidism.   - c/w Sensipar 30 po qd     DVT prophylaxis - sq lovenox  GI Proph Protonix    Family Discussion: Updated agree to d/c home w/ homecare / ambulatory sats 99% RA no need for Oxygen at home   Dispo: Home w/ VNS/ HHA today per CM

## 2024-01-09 NOTE — PROGRESS NOTE ADULT - SUBJECTIVE AND OBJECTIVE BOX
24H events:    Patient is a 89y old Female who presents with a chief complaint of shortness of breath (08 Jan 2024 14:11)    Primary diagnosis of Influenza A      Today is hospital day 12d. This morning patient was seen and examined at bedside, resting comfortably in bed.    No acute or major events overnight.    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    H/O total knee replacement, bilateral    H/O carpal tunnel repair  B/L      SOCIAL HISTORY:  Social History: na    ALLERGIES:  No Known Allergies    MEDICATIONS:  STANDING MEDICATIONS  albuterol/ipratropium for Nebulization 3 milliLiter(s) Nebulizer every 4 hours  aspirin enteric coated 81 milliGRAM(s) Oral daily  atorvastatin 40 milliGRAM(s) Oral at bedtime  chlorhexidine 2% Cloths 1 Application(s) Topical <User Schedule>  cinacalcet 30 milliGRAM(s) Oral daily  enoxaparin Injectable 40 milliGRAM(s) SubCutaneous every 24 hours  melatonin 3 milliGRAM(s) Oral at bedtime  metoprolol tartrate 25 milliGRAM(s) Oral every 12 hours  sodium chloride 3%  Inhalation 4 milliLiter(s) Inhalation every 12 hours    PRN MEDICATIONS  acetaminophen     Tablet .. 650 milliGRAM(s) Oral every 6 hours PRN    ROS:   no headaches, no dizziness, no fevers, no chills, no CP/SOB, no abdominal pain, no n/v/d, no hematochezia, no burning with urination, no hematuria, no weakness or tingling    I&O:  I&O's Summary    09 Jan 2024 07:01  -  09 Jan 2024 11:51  --------------------------------------------------------  IN: 0 mL / OUT: 300 mL / NET: -300 mL    VITALS:   ICU Vital Signs Last 24 Hrs  T(C): 36.3 (09 Jan 2024 04:00), Max: 36.3 (09 Jan 2024 04:00)  T(F): 97.4 (09 Jan 2024 04:00), Max: 97.4 (09 Jan 2024 04:00)  HR: 72 (09 Jan 2024 04:00) (72 - 79)  BP: 116/62 (09 Jan 2024 04:00) (111/66 - 116/62)  BP(mean): --  ABP: --  ABP(mean): --  RR: 18 (09 Jan 2024 04:00) (16 - 18)  SpO2: 100% (08 Jan 2024 20:54) (99% - 100%)    O2 Parameters below as of 08 Jan 2024 13:31  Patient On (Oxygen Delivery Method): room air    PHYSICAL EXAM:  GENERAL: lying in bed comfortably  HEAD: normal  HEART: RRR  LUNGS: CTA b/l  ABDOMEN: soft nontender nondistended  EXTREMITIES: no edema  NERVOUS SYSTEM:  A&OX3    Lines: none    LABS:                        10.5   10.74 )-----------( 245      ( 09 Jan 2024 07:57 )             31.6     01-09    139  |  102  |  55<H>  ----------------------------<  99  4.4   |  27  |  1.0    Ca    9.7      09 Jan 2024 07:57  Mg     2.2     01-09        Urinalysis Basic - ( 09 Jan 2024 07:57 )    Color: x / Appearance: x / SG: x / pH: x  Gluc: 99 mg/dL / Ketone: x  / Bili: x / Urobili: x   Blood: x / Protein: x / Nitrite: x   Leuk Esterase: x / RBC: x / WBC x   Sq Epi: x / Non Sq Epi: x / Bacteria: x    RADIOLOGY:    < from: Xray Chest 1 View- PORTABLE-Urgent (Xray Chest 1 View- PORTABLE-Urgent .) (01.05.24 @ 11:37) >  Impression:    Patchy bilateral opacifications without difference.    < end of copied text >

## 2024-01-09 NOTE — PROGRESS NOTE ADULT - SUBJECTIVE AND OBJECTIVE BOX
24H events:    Patient is a 89y old Female who presents with a chief complaint of shortness of breath (04 Jan 2024 12:36)    Admitted for diagnosis of Influenza A    Today is hospital day 12 d.     Pt is feeling good not requiring oxygen / ambulatory sats 99% / Awaiting HHA will resume care at home today - d/c today per CM    Code Status: DNR/DNI    Family communication:  On board for d/c and medically updated - Son's wife and son at bedside     PAST MEDICAL & SURGICAL HISTORY    HTN (hypertension)    Hypothyroid    Stage 3 chronic kidney disease    History of hip replacement, total, left approx 10/2013    H/O total knee replacement, bilateral    H/O carpal tunnel repair B/L      ALLERGIES:  No Known Allergies    MEDICATIONS:  MEDICATIONS  (STANDING):  albuterol/ipratropium for Nebulization 3 milliLiter(s) Nebulizer every 4 hours  aspirin enteric coated 81 milliGRAM(s) Oral daily  atorvastatin 40 milliGRAM(s) Oral at bedtime  chlorhexidine 2% Cloths 1 Application(s) Topical <User Schedule>  cinacalcet 30 milliGRAM(s) Oral daily  enoxaparin Injectable 40 milliGRAM(s) SubCutaneous every 24 hours  melatonin 3 milliGRAM(s) Oral at bedtime  metoprolol tartrate 25 milliGRAM(s) Oral every 12 hours  sodium chloride 3%  Inhalation 4 milliLiter(s) Inhalation every 12 hours    MEDICATIONS  (PRN):  acetaminophen     Tablet .. 650 milliGRAM(s) Oral every 6 hours PRN Moderate Pain (4 - 6)    VITALS:     T(C): 36.3 (01-09-24 @ 04:00), Max: 36.3 (01-09-24 @ 04:00)  T(F): 97.4 (01-09-24 @ 04:00), Max: 97.4 (01-09-24 @ 04:00)  HR: 72 (01-09-24 @ 04:00) (72 - 79)  BP: 116/62 (01-09-24 @ 04:00) (111/66 - 116/62)  RR: 18 (01-09-24 @ 04:00) (16 - 18)  SpO2: 100% (01-08-24 @ 20:54) (99% - 100%)      PHYSICAL EXAM:  GENERAL: NAD, Lying in bed comfortably       HEART:  +S1/S2, RRR  LUNGS: B/L Crackles  ABDOMEN:  +BS Soft NT ND   EXTREMITIES: Normal   no edema   NERVOUS SYSTEM:   A&Ox4     SKIN: No rashes or lesions         LABS:                          10.5   10.74 )-----------( 245      ( 09 Jan 2024 07:57 )             31.6   01-09    139  |  102  |  55<H>  ----------------------------<  99  4.4   |  27  |  1.0    Ca    9.7      09 Jan 2024 07:57    Mg     2.2     01-09                        11.5   10.74 )-----------( 260      ( 07 Jan 2024 08:42 )             34.5     01-07    141  |  99  |  51<H>  ----------------------------<  106<H>  4.6   |  30  |  1.1    Ca    9.9      07 Jan 2024 08:42  Mg     2.3     01-07    TPro  5.9<L>  /  Alb  3.4<L>  /  TBili  0.5  /  DBili  x   /  AST  21  /  ALT  23  /  AlkPhos  63  01-07                          11.6   10.98 )-----------( 259      ( 06 Jan 2024 06:57 )             34.7     01-06    137  |  98  |  44<H>  ----------------------------<  91  5.3<H>   |  27  |  1.0    Ca    9.5      06 Jan 2024 06:57    Mg     2.2     01-06    TPro  6.0  /  Alb  3.3<L>  /  TBili  0.7  /  DBili  x   /  AST  32  /  ALT  22  /  AlkPhos  61  01-06                        11.5   12.35 )-----------( 222      ( 05 Jan 2024 07:38 )             35.1     01-05    140  |  99  |  45<H>  ----------------------------<  93  4.0   |  31  |  1.0    Ca    9.6      05 Jan 2024 07:38  Mg     2.1     01-05    TPro  5.9<L>  /  Alb  3.5  /  TBili  0.8  /  DBili  x   /  AST  22  /  ALT  22  /  AlkPhos  62  01-05    Urinalysis Basic - ( 05 Jan 2024 07:38 )    Color: x / Appearance: x / SG: x / pH: x  Gluc: 93 mg/dL / Ketone: x  / Bili: x / Urobili: x   Blood: x / Protein: x / Nitrite: x   Leuk Esterase: x / RBC: x / WBC x   Sq Epi: x / Non Sq Epi: x / Bacteria: x    RADIOLOGY: reviewed

## 2024-01-14 DIAGNOSIS — J15.9 UNSPECIFIED BACTERIAL PNEUMONIA: ICD-10-CM

## 2024-01-14 DIAGNOSIS — J96.92 RESPIRATORY FAILURE, UNSPECIFIED WITH HYPERCAPNIA: ICD-10-CM

## 2024-01-14 DIAGNOSIS — E03.9 HYPOTHYROIDISM, UNSPECIFIED: ICD-10-CM

## 2024-01-14 DIAGNOSIS — I21.A1 MYOCARDIAL INFARCTION TYPE 2: ICD-10-CM

## 2024-01-14 DIAGNOSIS — Z96.653 PRESENCE OF ARTIFICIAL KNEE JOINT, BILATERAL: ICD-10-CM

## 2024-01-14 DIAGNOSIS — E87.0 HYPEROSMOLALITY AND HYPERNATREMIA: ICD-10-CM

## 2024-01-14 DIAGNOSIS — J96.01 ACUTE RESPIRATORY FAILURE WITH HYPOXIA: ICD-10-CM

## 2024-01-14 DIAGNOSIS — Z66 DO NOT RESUSCITATE: ICD-10-CM

## 2024-01-14 DIAGNOSIS — I13.0 HYPERTENSIVE HEART AND CHRONIC KIDNEY DISEASE WITH HEART FAILURE AND STAGE 1 THROUGH STAGE 4 CHRONIC KIDNEY DISEASE, OR UNSPECIFIED CHRONIC KIDNEY DISEASE: ICD-10-CM

## 2024-01-14 DIAGNOSIS — D63.1 ANEMIA IN CHRONIC KIDNEY DISEASE: ICD-10-CM

## 2024-01-14 DIAGNOSIS — J10.01 INFLUENZA DUE TO OTHER IDENTIFIED INFLUENZA VIRUS WITH THE SAME OTHER IDENTIFIED INFLUENZA VIRUS PNEUMONIA: ICD-10-CM

## 2024-01-14 DIAGNOSIS — A41.89 OTHER SPECIFIED SEPSIS: ICD-10-CM

## 2024-01-14 DIAGNOSIS — J69.0 PNEUMONITIS DUE TO INHALATION OF FOOD AND VOMIT: ICD-10-CM

## 2024-01-14 DIAGNOSIS — I50.23 ACUTE ON CHRONIC SYSTOLIC (CONGESTIVE) HEART FAILURE: ICD-10-CM

## 2024-01-14 DIAGNOSIS — N18.31 CHRONIC KIDNEY DISEASE, STAGE 3A: ICD-10-CM

## 2024-01-14 DIAGNOSIS — Z96.642 PRESENCE OF LEFT ARTIFICIAL HIP JOINT: ICD-10-CM

## 2024-01-19 NOTE — OCCUPATIONAL THERAPY INITIAL EVALUATION ADULT - PHYSICAL ASSIST/NONPHYSICAL ASSIST:DRESS UPPER BODY, OT EVAL
[Initial Evaluation] : an initial evaluation [FreeTextEntry1] : GERD [Follow-Up: _____] : a [unfilled] follow-up visit verbal cues/1 person assist

## 2024-05-29 NOTE — PHYSICAL THERAPY INITIAL EVALUATION ADULT - SPECIFY REASON(S)
Hold PT; at this time pt is placed on BiPap/ Cpap due to desaturation. Will f/u as appropriate. Is This A New Presentation, Or A Follow-Up?: Skin Lesion What Type Of Note Output Would You Prefer (Optional)?: Standard Output How Severe Is Your Skin Lesion?: mild Has Your Skin Lesion Been Treated?: not been treated

## 2024-08-28 NOTE — ED ADULT TRIAGE NOTE - HEART RATE (BEATS/MIN)
Abdomen , soft, mild tenderness under right rib cage, nondistended , no guarding or rigidity , no masses palpable , normal bowel sounds , Liver and Spleen,  no hepatosplenomegaly , liver nontender
No
74